# Patient Record
Sex: FEMALE | Race: WHITE | Employment: OTHER | URBAN - METROPOLITAN AREA
[De-identification: names, ages, dates, MRNs, and addresses within clinical notes are randomized per-mention and may not be internally consistent; named-entity substitution may affect disease eponyms.]

---

## 2019-02-06 LAB
ALBUMIN SERPL-MCNC: 4.4 G/DL
ALP BLD-CCNC: 83 U/L
ALT SERPL-CCNC: 29 U/L
ANION GAP SERPL CALCULATED.3IONS-SCNC: 1.8 MMOL/L
AST SERPL-CCNC: 29 U/L
BILIRUB SERPL-MCNC: 0.4 MG/DL (ref 0.1–1.4)
BUN BLDV-MCNC: 13 MG/DL
CALCIUM SERPL-MCNC: 10.3 MG/DL
CHLORIDE BLD-SCNC: 101 MMOL/L
CHOLESTEROL, TOTAL: 138 MG/DL
CHOLESTEROL/HDL RATIO: 2.03
CO2: 26 MMOL/L
CREAT SERPL-MCNC: 1.1 MG/DL
GFR CALCULATED: 51
GLUCOSE BLD-MCNC: 95 MG/DL
HDLC SERPL-MCNC: 68 MG/DL (ref 35–70)
LDL CHOLESTEROL CALCULATED: 43 MG/DL (ref 0–160)
POTASSIUM SERPL-SCNC: 4.3 MMOL/L
SODIUM BLD-SCNC: 142 MMOL/L
TOTAL PROTEIN: 6.8
TRIGL SERPL-MCNC: 133 MG/DL
VLDLC SERPL CALC-MCNC: 27 MG/DL

## 2019-05-14 ENCOUNTER — TELEPHONE (OUTPATIENT)
Dept: FAMILY MEDICINE CLINIC | Age: 69
End: 2019-05-14

## 2019-05-14 RX ORDER — VALSARTAN AND HYDROCHLOROTHIAZIDE 160; 12.5 MG/1; MG/1
1 TABLET, FILM COATED ORAL DAILY
Qty: 90 TABLET | Refills: 0 | Status: SHIPPED | OUTPATIENT
Start: 2019-05-14 | End: 2019-08-12 | Stop reason: SDUPTHER

## 2019-05-14 NOTE — TELEPHONE ENCOUNTER
RF WG/NL (E) VALSARTAN/HCTZ 160/12.5MG 1 QD #90/0.       Electronically signed by Ethan Chin MA on 5/14/2019 at 8:46 AM

## 2019-05-14 NOTE — TELEPHONE ENCOUNTER
----- Message from Alphonso Cline sent at 5/14/2019  8:22 AM EDT -----  Contact: HESHAM  ONLY ONE VALSARTAN LEFT.  NEEDS THE 90 DAY SUPPLY TO [facility].  160-12.5 MG. YOU TALKED ABOUT IT BEFORE. WAS A REPLACEMENT MED.  [phone]

## 2019-07-20 DIAGNOSIS — I10 ESSENTIAL HYPERTENSION: ICD-10-CM

## 2019-07-20 DIAGNOSIS — K21.9 GASTROESOPHAGEAL REFLUX DISEASE, ESOPHAGITIS PRESENCE NOT SPECIFIED: ICD-10-CM

## 2019-07-20 DIAGNOSIS — E78.5 HYPERLIPIDEMIA, UNSPECIFIED HYPERLIPIDEMIA TYPE: ICD-10-CM

## 2019-07-20 DIAGNOSIS — E03.9 HYPOTHYROIDISM, UNSPECIFIED TYPE: ICD-10-CM

## 2019-07-20 RX ORDER — PANTOPRAZOLE SODIUM 40 MG/1
1 TABLET, DELAYED RELEASE ORAL DAILY
Refills: 1 | COMMUNITY
Start: 2019-05-08 | End: 2019-08-12 | Stop reason: SDUPTHER

## 2019-07-20 RX ORDER — MECLIZINE HYDROCHLORIDE 25 MG/1
25 TABLET ORAL 3 TIMES DAILY PRN
COMMUNITY

## 2019-07-20 RX ORDER — ATORVASTATIN CALCIUM 40 MG/1
1 TABLET, FILM COATED ORAL DAILY
Refills: 1 | COMMUNITY
Start: 2019-05-08 | End: 2019-08-12 | Stop reason: SDUPTHER

## 2019-07-20 RX ORDER — LEVOTHYROXINE SODIUM 0.05 MG/1
1 TABLET ORAL DAILY
Refills: 1 | COMMUNITY
Start: 2019-05-08 | End: 2019-08-12 | Stop reason: SDUPTHER

## 2019-07-20 SDOH — HEALTH STABILITY: MENTAL HEALTH: HOW OFTEN DO YOU HAVE A DRINK CONTAINING ALCOHOL?: NEVER

## 2019-08-06 LAB
A/G RATIO: 2.2 (ref 1.1–2.2)
ALBUMIN SERPL-MCNC: 4.6 G/DL (ref 3.4–5)
ALP BLD-CCNC: 69 U/L (ref 40–129)
ALT SERPL-CCNC: 15 U/L (ref 10–40)
ANION GAP SERPL CALCULATED.3IONS-SCNC: 13 MMOL/L (ref 3–16)
AST SERPL-CCNC: 22 U/L (ref 15–37)
BILIRUB SERPL-MCNC: 0.4 MG/DL (ref 0–1)
BUN BLDV-MCNC: 15 MG/DL (ref 7–20)
CALCIUM SERPL-MCNC: 10 MG/DL (ref 8.3–10.6)
CHLORIDE BLD-SCNC: 102 MMOL/L (ref 99–110)
CHOLESTEROL, FASTING: 129 MG/DL (ref 0–199)
CO2: 26 MMOL/L (ref 21–32)
CREAT SERPL-MCNC: 1 MG/DL (ref 0.6–1.2)
GFR AFRICAN AMERICAN: >60
GFR NON-AFRICAN AMERICAN: 55
GLOBULIN: 2.1 G/DL
GLUCOSE FASTING: 99 MG/DL (ref 70–99)
HDLC SERPL-MCNC: 60 MG/DL (ref 40–60)
LDL CHOLESTEROL CALCULATED: 46 MG/DL
POTASSIUM SERPL-SCNC: 4.5 MMOL/L (ref 3.5–5.1)
SODIUM BLD-SCNC: 141 MMOL/L (ref 136–145)
T4 FREE: 1.5 NG/DL (ref 0.9–1.8)
TOTAL PROTEIN: 6.7 G/DL (ref 6.4–8.2)
TRIGLYCERIDE, FASTING: 116 MG/DL (ref 0–150)
TSH SERPL DL<=0.05 MIU/L-ACNC: 3.28 UIU/ML (ref 0.27–4.2)
VLDLC SERPL CALC-MCNC: 23 MG/DL

## 2019-08-12 ENCOUNTER — OFFICE VISIT (OUTPATIENT)
Dept: FAMILY MEDICINE CLINIC | Age: 69
End: 2019-08-12
Payer: MEDICARE

## 2019-08-12 VITALS
WEIGHT: 219 LBS | SYSTOLIC BLOOD PRESSURE: 124 MMHG | DIASTOLIC BLOOD PRESSURE: 68 MMHG | BODY MASS INDEX: 34.37 KG/M2 | HEIGHT: 67 IN

## 2019-08-12 DIAGNOSIS — I10 ESSENTIAL HYPERTENSION: ICD-10-CM

## 2019-08-12 DIAGNOSIS — Z23 NEED FOR PROPHYLACTIC VACCINATION AGAINST STREPTOCOCCUS PNEUMONIAE (PNEUMOCOCCUS): ICD-10-CM

## 2019-08-12 DIAGNOSIS — M79.601 ARM PAIN, DIFFUSE, RIGHT: ICD-10-CM

## 2019-08-12 DIAGNOSIS — Z78.0 POST-MENOPAUSAL: ICD-10-CM

## 2019-08-12 DIAGNOSIS — E78.5 HYPERLIPIDEMIA, UNSPECIFIED HYPERLIPIDEMIA TYPE: Primary | ICD-10-CM

## 2019-08-12 DIAGNOSIS — K21.9 GASTROESOPHAGEAL REFLUX DISEASE, ESOPHAGITIS PRESENCE NOT SPECIFIED: ICD-10-CM

## 2019-08-12 DIAGNOSIS — E03.9 HYPOTHYROIDISM, UNSPECIFIED TYPE: ICD-10-CM

## 2019-08-12 PROCEDURE — 20610 DRAIN/INJ JOINT/BURSA W/O US: CPT | Performed by: FAMILY MEDICINE

## 2019-08-12 PROCEDURE — G8417 CALC BMI ABV UP PARAM F/U: HCPCS | Performed by: FAMILY MEDICINE

## 2019-08-12 PROCEDURE — G8427 DOCREV CUR MEDS BY ELIG CLIN: HCPCS | Performed by: FAMILY MEDICINE

## 2019-08-12 PROCEDURE — 1036F TOBACCO NON-USER: CPT | Performed by: FAMILY MEDICINE

## 2019-08-12 PROCEDURE — 1123F ACP DISCUSS/DSCN MKR DOCD: CPT | Performed by: FAMILY MEDICINE

## 2019-08-12 PROCEDURE — 4040F PNEUMOC VAC/ADMIN/RCVD: CPT | Performed by: FAMILY MEDICINE

## 2019-08-12 PROCEDURE — 3017F COLORECTAL CA SCREEN DOC REV: CPT | Performed by: FAMILY MEDICINE

## 2019-08-12 PROCEDURE — 1090F PRES/ABSN URINE INCON ASSESS: CPT | Performed by: FAMILY MEDICINE

## 2019-08-12 PROCEDURE — G8400 PT W/DXA NO RESULTS DOC: HCPCS | Performed by: FAMILY MEDICINE

## 2019-08-12 PROCEDURE — 99213 OFFICE O/P EST LOW 20 MIN: CPT | Performed by: FAMILY MEDICINE

## 2019-08-12 RX ORDER — ATORVASTATIN CALCIUM 40 MG/1
40 TABLET, FILM COATED ORAL DAILY
Qty: 90 TABLET | Refills: 1 | Status: SHIPPED | OUTPATIENT
Start: 2019-08-12 | End: 2020-02-04 | Stop reason: SDUPTHER

## 2019-08-12 RX ORDER — METHYLPREDNISOLONE ACETATE 80 MG/ML
80 INJECTION, SUSPENSION INTRA-ARTICULAR; INTRALESIONAL; INTRAMUSCULAR; SOFT TISSUE ONCE
Status: DISCONTINUED | OUTPATIENT
Start: 2019-08-12 | End: 2019-08-12

## 2019-08-12 RX ORDER — LEVOTHYROXINE SODIUM 0.05 MG/1
50 TABLET ORAL DAILY
Qty: 90 TABLET | Refills: 1 | Status: SHIPPED | OUTPATIENT
Start: 2019-08-12 | End: 2020-02-04 | Stop reason: SDUPTHER

## 2019-08-12 RX ORDER — METHYLPREDNISOLONE ACETATE 80 MG/ML
80 INJECTION, SUSPENSION INTRA-ARTICULAR; INTRALESIONAL; INTRAMUSCULAR; SOFT TISSUE ONCE
Status: COMPLETED | OUTPATIENT
Start: 2019-08-12 | End: 2019-08-12

## 2019-08-12 RX ORDER — VALSARTAN AND HYDROCHLOROTHIAZIDE 160; 12.5 MG/1; MG/1
1 TABLET, FILM COATED ORAL DAILY
Qty: 90 TABLET | Refills: 1 | Status: SHIPPED | OUTPATIENT
Start: 2019-08-12 | End: 2020-02-04 | Stop reason: SDUPTHER

## 2019-08-12 RX ORDER — PANTOPRAZOLE SODIUM 40 MG/1
40 TABLET, DELAYED RELEASE ORAL DAILY
Qty: 90 TABLET | Refills: 1 | Status: SHIPPED | OUTPATIENT
Start: 2019-08-12 | End: 2020-02-04 | Stop reason: SDUPTHER

## 2019-08-12 RX ADMIN — METHYLPREDNISOLONE ACETATE 80 MG: 80 INJECTION, SUSPENSION INTRA-ARTICULAR; INTRALESIONAL; INTRAMUSCULAR; SOFT TISSUE at 11:29

## 2019-08-12 ASSESSMENT — PATIENT HEALTH QUESTIONNAIRE - PHQ9
SUM OF ALL RESPONSES TO PHQ9 QUESTIONS 1 & 2: 0
1. LITTLE INTEREST OR PLEASURE IN DOING THINGS: 0
2. FEELING DOWN, DEPRESSED OR HOPELESS: 0
SUM OF ALL RESPONSES TO PHQ QUESTIONS 1-9: 0
SUM OF ALL RESPONSES TO PHQ QUESTIONS 1-9: 0

## 2019-08-12 ASSESSMENT — ENCOUNTER SYMPTOMS
GASTROINTESTINAL NEGATIVE: 1
SHORTNESS OF BREATH: 0
CHEST TIGHTNESS: 0
WHEEZING: 0
RESPIRATORY NEGATIVE: 1
COUGH: 0
ABDOMINAL PAIN: 0

## 2019-08-12 NOTE — PROGRESS NOTES
tablet by mouth daily 90 tablet 1    levothyroxine (SYNTHROID) 50 MCG tablet Take 1 tablet by mouth daily 90 tablet 1    meclizine (ANTIVERT) 25 MG tablet Take 25 mg by mouth 3 times daily as needed       Current Facility-Administered Medications   Medication Dose Route Frequency Provider Last Rate Last Dose    methylPREDNISolone acetate (DEPO-MEDROL) injection 80 mg  80 mg Intra-articular Once Paulo Patel MD           ALLERGIES  Allergies   Allergen Reactions    Codeine Other (See Comments)     hallucinations       PHYSICAL EXAM    /68 (Site: Left Upper Arm, Position: Sitting, Cuff Size: Large Adult)   Ht 5' 7\" (1.702 m)   Wt 219 lb (99.3 kg)   BMI 34.30 kg/m²     Physical Exam   Constitutional: She is oriented to person, place, and time. She appears well-developed and well-nourished. HENT:   Right Ear: External ear normal.   Left Ear: External ear normal.   Nose: Nose normal.   Mouth/Throat: Oropharynx is clear and moist.   Eyes: Conjunctivae are normal.   Neck: No thyromegaly present. Cardiovascular: Normal rate, regular rhythm, normal heart sounds and intact distal pulses. Pulmonary/Chest: Effort normal and breath sounds normal. No respiratory distress. She has no wheezes. She has no rales. Abdominal: Soft. She exhibits no distension and no mass. There is no tenderness. There is no guarding. Musculoskeletal:   Tenderness in the subdeltoid area right arm  There is no swelling or redness  She has limitation of abduction because of pain and stiffness at about 90 degrees   Lymphadenopathy:     She has no cervical adenopathy. Neurological: She is alert and oriented to person, place, and time. Skin: Skin is warm and dry. Psychiatric: She has a normal mood and affect. Nursing note and vitals reviewed.   Lab review: The patient's laboratory work including chemistry lipid profile and thyroid labs are within    ASSESSMENT:    Bijan ANSARI was seen today for arm pain, 6 month follow-up and medication refill. Diagnoses and all orders for this visit:    Hyperlipidemia, unspecified hyperlipidemia type  -     Lipid Panel; Future    Post-menopausal    Need for prophylactic vaccination against Streptococcus pneumoniae (pneumococcus)    Hypothyroidism, unspecified type    Gastroesophageal reflux disease, esophagitis presence not specified    Essential hypertension  -     Comprehensive Metabolic Panel; Future    Arm pain, diffuse, right  -     OK ARTHROCENTESIS ASPIR&/INJ MAJOR JT/BURSA W/O US    Other orders  -     valsartan-hydrochlorothiazide (DIOVAN-HCT) 160-12.5 MG per tablet; Take 1 tablet by mouth daily  -     pantoprazole (PROTONIX) 40 MG tablet; Take 1 tablet by mouth daily  -     atorvastatin (LIPITOR) 40 MG tablet; Take 1 tablet by mouth daily  -     levothyroxine (SYNTHROID) 50 MCG tablet; Take 1 tablet by mouth daily  -     Discontinue: methylPREDNISolone acetate (DEPO-MEDROL) injection 80 mg  -     methylPREDNISolone acetate (DEPO-MEDROL) injection 80 mg            PLAN:  The patient declines a bone density study as she had one around age 70 and is always been normal  I talked her about injecting for subacromial bursitis the right shoulder she was in favor that and I injected the right subacromial bursa with 80 mg of Depo-Medrol 2 cc of Xylocaine  She was instructed on how to maintain range of motion and improve range of motion of her right shoulder  She is to call in 3 weeks if she is not better might get an x-ray and consider physical therapy  She is to continue her same chronic medications and follow-up in 6 months and get labs prior to that office visit          No follow-ups on file. Electronically signed by Johnny Heath MD on 8/12/2019    Please note that this chart was generated using dragon dictation software. Although every effort was made to ensure the accuracy of this automated transcription, some errors in transcription may have occurred.

## 2020-02-04 RX ORDER — LEVOTHYROXINE SODIUM 0.05 MG/1
50 TABLET ORAL DAILY
Qty: 90 TABLET | Refills: 0 | Status: SHIPPED | OUTPATIENT
Start: 2020-02-04 | End: 2020-03-16 | Stop reason: SDUPTHER

## 2020-02-04 RX ORDER — PANTOPRAZOLE SODIUM 40 MG/1
40 TABLET, DELAYED RELEASE ORAL DAILY
Qty: 90 TABLET | Refills: 0 | Status: SHIPPED | OUTPATIENT
Start: 2020-02-04 | End: 2020-03-16 | Stop reason: SDUPTHER

## 2020-02-04 RX ORDER — ATORVASTATIN CALCIUM 40 MG/1
40 TABLET, FILM COATED ORAL DAILY
Qty: 90 TABLET | Refills: 0 | Status: SHIPPED | OUTPATIENT
Start: 2020-02-04 | End: 2020-03-16 | Stop reason: SDUPTHER

## 2020-02-04 RX ORDER — VALSARTAN AND HYDROCHLOROTHIAZIDE 160; 12.5 MG/1; MG/1
1 TABLET, FILM COATED ORAL DAILY
Qty: 90 TABLET | Refills: 0 | Status: SHIPPED | OUTPATIENT
Start: 2020-02-04 | End: 2020-03-16 | Stop reason: SDUPTHER

## 2020-03-11 DIAGNOSIS — I10 ESSENTIAL HYPERTENSION: ICD-10-CM

## 2020-03-11 DIAGNOSIS — E03.9 HYPOTHYROIDISM, UNSPECIFIED TYPE: ICD-10-CM

## 2020-03-11 DIAGNOSIS — E78.5 HYPERLIPIDEMIA, UNSPECIFIED HYPERLIPIDEMIA TYPE: ICD-10-CM

## 2020-03-11 LAB
A/G RATIO: 1.8 (ref 1.1–2.2)
ALBUMIN SERPL-MCNC: 4.4 G/DL (ref 3.4–5)
ALP BLD-CCNC: 84 U/L (ref 40–129)
ALT SERPL-CCNC: 33 U/L (ref 10–40)
ANION GAP SERPL CALCULATED.3IONS-SCNC: 12 MMOL/L (ref 3–16)
AST SERPL-CCNC: 31 U/L (ref 15–37)
BILIRUB SERPL-MCNC: 0.4 MG/DL (ref 0–1)
BUN BLDV-MCNC: 12 MG/DL (ref 7–20)
CALCIUM SERPL-MCNC: 9.8 MG/DL (ref 8.3–10.6)
CHLORIDE BLD-SCNC: 103 MMOL/L (ref 99–110)
CHOLESTEROL, TOTAL: 135 MG/DL (ref 0–199)
CO2: 27 MMOL/L (ref 21–32)
CREAT SERPL-MCNC: 0.9 MG/DL (ref 0.6–1.2)
GFR AFRICAN AMERICAN: >60
GFR NON-AFRICAN AMERICAN: >60
GLOBULIN: 2.4 G/DL
GLUCOSE BLD-MCNC: 106 MG/DL (ref 70–99)
HDLC SERPL-MCNC: 50 MG/DL (ref 40–60)
LDL CHOLESTEROL CALCULATED: 43 MG/DL
POTASSIUM SERPL-SCNC: 4.2 MMOL/L (ref 3.5–5.1)
SODIUM BLD-SCNC: 142 MMOL/L (ref 136–145)
T4 FREE: 1.3 NG/DL (ref 0.9–1.8)
TOTAL PROTEIN: 6.8 G/DL (ref 6.4–8.2)
TRIGL SERPL-MCNC: 212 MG/DL (ref 0–150)
TSH SERPL DL<=0.05 MIU/L-ACNC: 3.19 UIU/ML (ref 0.27–4.2)
VLDLC SERPL CALC-MCNC: 42 MG/DL

## 2020-03-13 ENCOUNTER — TELEPHONE (OUTPATIENT)
Dept: INTERNAL MEDICINE CLINIC | Age: 70
End: 2020-03-13

## 2020-03-16 ENCOUNTER — OFFICE VISIT (OUTPATIENT)
Dept: FAMILY MEDICINE CLINIC | Age: 70
End: 2020-03-16
Payer: MEDICARE

## 2020-03-16 VITALS
WEIGHT: 222 LBS | BODY MASS INDEX: 34.84 KG/M2 | SYSTOLIC BLOOD PRESSURE: 120 MMHG | OXYGEN SATURATION: 96 % | HEART RATE: 80 BPM | HEIGHT: 67 IN | DIASTOLIC BLOOD PRESSURE: 84 MMHG

## 2020-03-16 VITALS
OXYGEN SATURATION: 96 % | DIASTOLIC BLOOD PRESSURE: 84 MMHG | WEIGHT: 222 LBS | HEIGHT: 67 IN | SYSTOLIC BLOOD PRESSURE: 120 MMHG | HEART RATE: 80 BPM | BODY MASS INDEX: 34.84 KG/M2

## 2020-03-16 PROCEDURE — 1090F PRES/ABSN URINE INCON ASSESS: CPT | Performed by: FAMILY MEDICINE

## 2020-03-16 PROCEDURE — 1123F ACP DISCUSS/DSCN MKR DOCD: CPT | Performed by: FAMILY MEDICINE

## 2020-03-16 PROCEDURE — G8482 FLU IMMUNIZE ORDER/ADMIN: HCPCS | Performed by: FAMILY MEDICINE

## 2020-03-16 PROCEDURE — 4040F PNEUMOC VAC/ADMIN/RCVD: CPT | Performed by: FAMILY MEDICINE

## 2020-03-16 PROCEDURE — G0438 PPPS, INITIAL VISIT: HCPCS | Performed by: FAMILY MEDICINE

## 2020-03-16 PROCEDURE — 1036F TOBACCO NON-USER: CPT | Performed by: FAMILY MEDICINE

## 2020-03-16 PROCEDURE — G8400 PT W/DXA NO RESULTS DOC: HCPCS | Performed by: FAMILY MEDICINE

## 2020-03-16 PROCEDURE — G8417 CALC BMI ABV UP PARAM F/U: HCPCS | Performed by: FAMILY MEDICINE

## 2020-03-16 PROCEDURE — 99214 OFFICE O/P EST MOD 30 MIN: CPT | Performed by: FAMILY MEDICINE

## 2020-03-16 PROCEDURE — 3017F COLORECTAL CA SCREEN DOC REV: CPT | Performed by: FAMILY MEDICINE

## 2020-03-16 PROCEDURE — G8427 DOCREV CUR MEDS BY ELIG CLIN: HCPCS | Performed by: FAMILY MEDICINE

## 2020-03-16 RX ORDER — PANTOPRAZOLE SODIUM 40 MG/1
40 TABLET, DELAYED RELEASE ORAL DAILY
Qty: 90 TABLET | Refills: 1 | Status: SHIPPED | OUTPATIENT
Start: 2020-03-16 | End: 2020-09-17 | Stop reason: SDUPTHER

## 2020-03-16 RX ORDER — ATORVASTATIN CALCIUM 40 MG/1
40 TABLET, FILM COATED ORAL DAILY
Qty: 90 TABLET | Refills: 1 | Status: SHIPPED | OUTPATIENT
Start: 2020-03-16 | End: 2020-09-17 | Stop reason: SDUPTHER

## 2020-03-16 RX ORDER — DIAZEPAM 10 MG/1
10 TABLET ORAL EVERY 8 HOURS PRN
Qty: 2 TABLET | Refills: 0 | Status: SHIPPED | OUTPATIENT
Start: 2020-03-16 | End: 2020-03-26

## 2020-03-16 RX ORDER — LEVOTHYROXINE SODIUM 0.05 MG/1
50 TABLET ORAL DAILY
Qty: 90 TABLET | Refills: 1 | Status: SHIPPED | OUTPATIENT
Start: 2020-03-16 | End: 2020-09-17 | Stop reason: SDUPTHER

## 2020-03-16 RX ORDER — VALSARTAN AND HYDROCHLOROTHIAZIDE 160; 12.5 MG/1; MG/1
1 TABLET, FILM COATED ORAL DAILY
Qty: 90 TABLET | Refills: 1 | Status: SHIPPED | OUTPATIENT
Start: 2020-03-16 | End: 2020-09-17 | Stop reason: SDUPTHER

## 2020-03-16 ASSESSMENT — PATIENT HEALTH QUESTIONNAIRE - PHQ9
2. FEELING DOWN, DEPRESSED OR HOPELESS: 0
1. LITTLE INTEREST OR PLEASURE IN DOING THINGS: 0
SUM OF ALL RESPONSES TO PHQ QUESTIONS 1-9: 0
SUM OF ALL RESPONSES TO PHQ9 QUESTIONS 1 & 2: 0
SUM OF ALL RESPONSES TO PHQ QUESTIONS 1-9: 0

## 2020-03-16 ASSESSMENT — ENCOUNTER SYMPTOMS
SHORTNESS OF BREATH: 0
WHEEZING: 0
COUGH: 0
CHEST TIGHTNESS: 0
RESPIRATORY NEGATIVE: 1
ABDOMINAL PAIN: 0

## 2020-03-16 ASSESSMENT — LIFESTYLE VARIABLES: HOW OFTEN DO YOU HAVE A DRINK CONTAINING ALCOHOL: 0

## 2020-03-16 NOTE — PROGRESS NOTES
Onset    Other Mother         unknown cardiac    Other Father         unknown cardiac    Other Sister         cardiac       SOCIAL HISTORY  Social History     Socioeconomic History    Marital status:      Spouse name: None    Number of children: None    Years of education: None    Highest education level: None   Occupational History    None   Social Needs    Financial resource strain: None    Food insecurity     Worry: None     Inability: None    Transportation needs     Medical: None     Non-medical: None   Tobacco Use    Smoking status: Never Smoker    Smokeless tobacco: Never Used   Substance and Sexual Activity    Alcohol use: Never     Frequency: Never    Drug use: Never    Sexual activity: None   Lifestyle    Physical activity     Days per week: None     Minutes per session: None    Stress: None   Relationships    Social connections     Talks on phone: None     Gets together: None     Attends [employer] service: None     Active member of club or organization: None     Attends meetings of clubs or organizations: None     Relationship status: None    Intimate partner violence     Fear of current or ex partner: None     Emotionally abused: None     Physically abused: None     Forced sexual activity: None   Other Topics Concern    None   Social History Narrative    None        SURGICAL HISTORY  Past Surgical History:   Procedure Laterality Date    HYSTERECTOMY  1970    KNEE ARTHROSCOPY Left 2013    LUMBAR SPINE SURGERY  2009 & 2014    Herniated disc repair    NECK SURGERY  2004    cervical disc repair       CURRENT MEDICATIONS  Current Outpatient Medications   Medication Sig Dispense Refill    atorvastatin (LIPITOR) 40 MG tablet Take 1 tablet by mouth daily 90 tablet 1    levothyroxine (SYNTHROID) 50 MCG tablet Take 1 tablet by mouth daily 90 tablet 1    pantoprazole (PROTONIX) 40 MG tablet Take 1 tablet by mouth daily 90 tablet 1    valsartan-hydrochlorothiazide (DIOVAN-HCT)

## 2020-03-16 NOTE — PROGRESS NOTES
Medicare Annual Wellness Visit  Name: Wojciech Kaur Date: 3/16/2020   MRN: [id number] Sex: Female   Age: 68 y.o. Ethnicity: Non-/Non    : 1950 Race: Radha Vera is here for Medicare AWV    Screenings for behavioral, psychosocial and functional/safety risks, and cognitive dysfunction are all negative except as indicated below. These results, as well as other patient data from the [address] form, are documented in Flowsheets linked to this Encounter. Allergies   Allergen Reactions    Codeine Other (See Comments)     hallucinations       Prior to Visit Medications    Medication Sig Taking? Authorizing Provider   atorvastatin (LIPITOR) 40 MG tablet Take 1 tablet by mouth daily  Kaycee Mcdowell MD   levothyroxine (SYNTHROID) 50 MCG tablet Take 1 tablet by mouth daily  Kaycee Mcdowell MD   pantoprazole (PROTONIX) 40 MG tablet Take 1 tablet by mouth daily  Kaycee Mcdowell MD   valsartan-hydrochlorothiazide (DIOVAN-HCT) 160-12.5 MG per tablet Take 1 tablet by mouth daily  Kaycee Mcdowell MD   diazePAM (VALIUM) 10 MG tablet Take 1 tablet by mouth every 8 hours as needed for Anxiety or Sleep for up to 10 days.   Kaycee Mcdowell MD   meclizine (ANTIVERT) 25 MG tablet Take 25 mg by mouth 3 times daily as needed  Historical Provider, MD       Past Medical History:   Diagnosis Date    Essential hypertension 2019    GERD (gastroesophageal reflux disease) 2019    Hyperlipidemia 2019    Hypothyroidism 2019       Past Surgical History:   Procedure Laterality Date    HYSTERECTOMY  1970    KNEE ARTHROSCOPY Left 2013    LUMBAR SPINE SURGERY   &     Herniated disc repair    NECK SURGERY      cervical disc repair       Family History   Problem Relation Age of Onset    Other Mother         unknown cardiac    Other Father         unknown cardiac    Other Sister         cardiac       CareTeam (Including outside

## 2020-03-16 NOTE — PATIENT INSTRUCTIONS
Personalized Preventive Plan for Elsie Holcomb - 3/16/2020  Medicare offers a range of preventive health benefits. Some of the tests and screenings are paid in full while other may be subject to a deductible, co-insurance, and/or copay. Some of these benefits include a comprehensive review of your medical history including lifestyle, illnesses that may run in your family, and various assessments and screenings as appropriate. After reviewing your medical record and screening and assessments performed today your provider may have ordered immunizations, labs, imaging, and/or referrals for you. A list of these orders (if applicable) as well as your Preventive Care list are included within your After Visit Summary for your review. Other Preventive Recommendations:    · A preventive eye exam performed by an eye specialist is recommended every 1-2 years to screen for glaucoma; cataracts, macular degeneration, and other eye disorders. · A preventive dental visit is recommended every 6 months. · Try to get at least 150 minutes of exercise per week or 10,000 steps per day on a pedometer . · Order or download the FREE \"Exercise & Physical Activity: Your Everyday Guide\" from [employer]. Call 8-406.992.3445 or search [employer] online. · You need 8843-2570 mg of calcium and 3264-1587 IU of vitamin D per day. It is possible to meet your calcium requirement with diet alone, but a vitamin D supplement is usually necessary to meet this goal.  · When exposed to the sun, use a sunscreen that protects against both UVA and UVB radiation with an SPF of 30 or greater. Reapply every 2 to 3 hours or after sweating, drying off with a towel, or swimming. · Always wear a seat belt when traveling in a car. Always wear a helmet when riding a bicycle or motorcycle.     Keeping Home a [facility]       As we get older, changes in balance, gait, strength, vision, hearing, and cognition make \"Older Consumer Home Safety Checklist,\" it is important to check for potential hazards in each room. And remember, proper lighting is an essential factor in home safety. If you cannot see clearly, you are more likely to fall. Important questions to ask yourself include:   Are lamp, electric, extension, and telephone cords placed out of the flow of traffic and maintained in good condition? Have frayed cords been replaced? Are all small rugs and runners slip resistant? If not, you can secure them to the floor with a special double-sided carpet tape. Are smoke detectors properly locatedone on every floor of your home and one outside of every sleeping area? Are they in good working order? Are batteries replaced at least once a year? Do you have a well-maintained carbon monoxide detector outside every sleeping are in your home? Does your furniture layout leave plenty of space to maneuver between and around chairs, tables, beds, and sofas? Are hallways, stairs and passages between rooms well lit? Can you reach a lamp without getting out of bed? Are floor surfaces well maintained? Shag rugs, high-pile carpeting, tile floors, and polished wood floors can be particularly slippery. Stairs should always have handrails and be carpeted or fitted with a non-skid tread. Is your telephone easily reachable. Is the cord safely tucked away? Room by Room   According to the [employer], bathrooms and marry are the two most potentially hazardous rooms in your home. In the Kitchen    Be sure your stove is in proper working order and always make sure burners and the oven are off before you go out or go to sleep. Keep pots on the back burners, turn handles away from the front of the stove, and keep stove clean and free of grease build-up. Kitchen ventilation systems and range exhausts should be working properly.     Keep flammable objects such as towels and pot holders away from the cooking area except

## 2020-03-23 ENCOUNTER — TELEPHONE (OUTPATIENT)
Dept: FAMILY MEDICINE CLINIC | Age: 70
End: 2020-03-23

## 2020-09-15 DIAGNOSIS — R73.01 IFG (IMPAIRED FASTING GLUCOSE): ICD-10-CM

## 2020-09-15 DIAGNOSIS — E03.9 HYPOTHYROIDISM, UNSPECIFIED TYPE: ICD-10-CM

## 2020-09-15 DIAGNOSIS — E78.5 HYPERLIPIDEMIA, UNSPECIFIED HYPERLIPIDEMIA TYPE: ICD-10-CM

## 2020-09-15 LAB
A/G RATIO: 2 (ref 1.1–2.2)
ALBUMIN SERPL-MCNC: 4.4 G/DL (ref 3.4–5)
ALP BLD-CCNC: 72 U/L (ref 40–129)
ALT SERPL-CCNC: 23 U/L (ref 10–40)
ANION GAP SERPL CALCULATED.3IONS-SCNC: 11 MMOL/L (ref 3–16)
AST SERPL-CCNC: 28 U/L (ref 15–37)
BILIRUB SERPL-MCNC: 0.5 MG/DL (ref 0–1)
BUN BLDV-MCNC: 18 MG/DL (ref 7–20)
CALCIUM SERPL-MCNC: 10.1 MG/DL (ref 8.3–10.6)
CHLORIDE BLD-SCNC: 105 MMOL/L (ref 99–110)
CHOLESTEROL, TOTAL: 116 MG/DL (ref 0–199)
CO2: 26 MMOL/L (ref 21–32)
CREAT SERPL-MCNC: 1 MG/DL (ref 0.6–1.2)
GFR AFRICAN AMERICAN: >60
GFR NON-AFRICAN AMERICAN: 55
GLOBULIN: 2.2 G/DL
GLUCOSE BLD-MCNC: 99 MG/DL (ref 70–99)
HDLC SERPL-MCNC: 59 MG/DL (ref 40–60)
LDL CHOLESTEROL CALCULATED: 37 MG/DL
POTASSIUM SERPL-SCNC: 3.8 MMOL/L (ref 3.5–5.1)
SODIUM BLD-SCNC: 142 MMOL/L (ref 136–145)
T4 FREE: 1.3 NG/DL (ref 0.9–1.8)
TOTAL PROTEIN: 6.6 G/DL (ref 6.4–8.2)
TRIGL SERPL-MCNC: 102 MG/DL (ref 0–150)
TSH SERPL DL<=0.05 MIU/L-ACNC: 2.32 UIU/ML (ref 0.27–4.2)
VLDLC SERPL CALC-MCNC: 20 MG/DL

## 2020-09-16 LAB
ESTIMATED AVERAGE GLUCOSE: 114 MG/DL
HBA1C MFR BLD: 5.6 %

## 2020-09-17 ENCOUNTER — OFFICE VISIT (OUTPATIENT)
Dept: FAMILY MEDICINE CLINIC | Age: 70
End: 2020-09-17
Payer: MEDICARE

## 2020-09-17 VITALS
DIASTOLIC BLOOD PRESSURE: 78 MMHG | TEMPERATURE: 96.7 F | BODY MASS INDEX: 33.27 KG/M2 | OXYGEN SATURATION: 98 % | WEIGHT: 212 LBS | HEART RATE: 68 BPM | HEIGHT: 67 IN | SYSTOLIC BLOOD PRESSURE: 112 MMHG

## 2020-09-17 PROBLEM — R73.01 IFG (IMPAIRED FASTING GLUCOSE): Chronic | Status: ACTIVE | Noted: 2020-09-17

## 2020-09-17 PROCEDURE — G8400 PT W/DXA NO RESULTS DOC: HCPCS | Performed by: FAMILY MEDICINE

## 2020-09-17 PROCEDURE — G8427 DOCREV CUR MEDS BY ELIG CLIN: HCPCS | Performed by: FAMILY MEDICINE

## 2020-09-17 PROCEDURE — 99213 OFFICE O/P EST LOW 20 MIN: CPT | Performed by: FAMILY MEDICINE

## 2020-09-17 PROCEDURE — G8417 CALC BMI ABV UP PARAM F/U: HCPCS | Performed by: FAMILY MEDICINE

## 2020-09-17 PROCEDURE — 1123F ACP DISCUSS/DSCN MKR DOCD: CPT | Performed by: FAMILY MEDICINE

## 2020-09-17 PROCEDURE — 3017F COLORECTAL CA SCREEN DOC REV: CPT | Performed by: FAMILY MEDICINE

## 2020-09-17 PROCEDURE — 1090F PRES/ABSN URINE INCON ASSESS: CPT | Performed by: FAMILY MEDICINE

## 2020-09-17 PROCEDURE — 4040F PNEUMOC VAC/ADMIN/RCVD: CPT | Performed by: FAMILY MEDICINE

## 2020-09-17 PROCEDURE — 1036F TOBACCO NON-USER: CPT | Performed by: FAMILY MEDICINE

## 2020-09-17 RX ORDER — VALSARTAN AND HYDROCHLOROTHIAZIDE 160; 12.5 MG/1; MG/1
1 TABLET, FILM COATED ORAL DAILY
Qty: 90 TABLET | Refills: 1 | Status: SHIPPED | OUTPATIENT
Start: 2020-09-17 | End: 2021-03-18 | Stop reason: SDUPTHER

## 2020-09-17 RX ORDER — ATORVASTATIN CALCIUM 40 MG/1
40 TABLET, FILM COATED ORAL DAILY
Qty: 90 TABLET | Refills: 1 | Status: SHIPPED | OUTPATIENT
Start: 2020-09-17 | End: 2021-03-18 | Stop reason: SDUPTHER

## 2020-09-17 RX ORDER — PANTOPRAZOLE SODIUM 40 MG/1
40 TABLET, DELAYED RELEASE ORAL DAILY
Qty: 90 TABLET | Refills: 1 | Status: SHIPPED | OUTPATIENT
Start: 2020-09-17 | End: 2021-03-18 | Stop reason: SDUPTHER

## 2020-09-17 RX ORDER — LEVOTHYROXINE SODIUM 0.05 MG/1
50 TABLET ORAL DAILY
Qty: 90 TABLET | Refills: 1 | Status: SHIPPED | OUTPATIENT
Start: 2020-09-17 | End: 2021-03-18 | Stop reason: SDUPTHER

## 2020-09-17 ASSESSMENT — ENCOUNTER SYMPTOMS
COUGH: 0
ALLERGIC/IMMUNOLOGIC NEGATIVE: 1
SORE THROAT: 0
SHORTNESS OF BREATH: 0
DIARRHEA: 0

## 2020-09-17 NOTE — PROGRESS NOTES
2020     Genia Trejo      Chief Complaint   Patient presents with    6 Month Follow-Up     No concerns, She has lost weight, increased exercise    Medication Refill       HPI      Kar Canchola is a 68 y.o. female who presents today with the followin. Hypothyroidism, unspecified type    2. Essential hypertension    3. Hyperlipidemia, unspecified hyperlipidemia type    4. IFG (impaired fasting glucose)      Follow-up of multiple medical problems  The patient is a generally doing well  She lost about 10 pounds after I told her she had impaired fasting glucose  She lives with her   She has had minimal exposure to other people  She is following the guidelines for COVID-19 prevention  She has no symptoms nor known exposure  REVIEW OF SYMPTOMS    Review of Systems   Constitutional: Positive for activity change. Negative for fever and unexpected weight change. HENT: Negative for congestion and sore throat. Respiratory: Negative for cough and shortness of breath. Cardiovascular: Negative for chest pain. History of essential hypertension  History of hyperlipidemia  Both well controlled on medication and diet   Gastrointestinal: Negative for diarrhea. Endocrine:        History of impaired fasting glucose  As she is lost weight recently  She has been on long-term thyroid replacement   Genitourinary: Negative for difficulty urinating. CKD 3 which is been stable   Musculoskeletal:        Takes ibuprofen over-the-counter low-dose occasionally   Allergic/Immunologic: Negative. Neurological: Negative. Psychiatric/Behavioral: Negative.         PAST MEDICAL HISTORY  Past Medical History:   Diagnosis Date    Essential hypertension 2019    GERD (gastroesophageal reflux disease) 2019    Hyperlipidemia 2019    Hypothyroidism 2019       FAMILY HISTORY  Family History   Problem Relation Age of Onset    Other Mother         unknown cardiac    Other Father         unknown cardiac    Other Sister         cardiac       SOCIAL HISTORY  Social History     Socioeconomic History    Marital status:      Spouse name: None    Number of children: None    Years of education: None    Highest education level: None   Occupational History    None   Social Needs    Financial resource strain: None    Food insecurity     Worry: None     Inability: None    Transportation needs     Medical: None     Non-medical: None   Tobacco Use    Smoking status: Never Smoker    Smokeless tobacco: Never Used   Substance and Sexual Activity    Alcohol use: Never     Frequency: Never    Drug use: Never    Sexual activity: None   Lifestyle    Physical activity     Days per week: None     Minutes per session: None    Stress: None   Relationships    Social connections     Talks on phone: None     Gets together: None     Attends [employer] service: None     Active member of club or organization: None     Attends meetings of clubs or organizations: None     Relationship status: None    Intimate partner violence     Fear of current or ex partner: None     Emotionally abused: None     Physically abused: None     Forced sexual activity: None   Other Topics Concern    None   Social History Narrative    None        SURGICAL HISTORY  Past Surgical History:   Procedure Laterality Date    HYSTERECTOMY  1970    KNEE ARTHROSCOPY Left 2013    LUMBAR SPINE SURGERY  2009 & 2014    Herniated disc repair    NECK SURGERY  2004    cervical disc repair       CURRENT MEDICATIONS  Current Outpatient Medications   Medication Sig Dispense Refill    valsartan-hydrochlorothiazide (DIOVAN-HCT) 160-12.5 MG per tablet Take 1 tablet by mouth daily 90 tablet 1    pantoprazole (PROTONIX) 40 MG tablet Take 1 tablet by mouth daily 90 tablet 1    levothyroxine (SYNTHROID) 50 MCG tablet Take 1 tablet by mouth daily 90 tablet 1    atorvastatin (LIPITOR) 40 MG tablet Take 1 tablet by mouth daily 90 tablet 1    meclizine (ANTIVERT) 25 MG tablet Take 25 mg by mouth 3 times daily as needed       No current facility-administered medications for this visit. ALLERGIES  Allergies   Allergen Reactions    Codeine Other (See Comments)     hallucinations       PHYSICAL EXAM    /78 (Site: Right Upper Arm, Position: Sitting, Cuff Size: Large Adult)   Pulse 68   Temp 96.7 °F (35.9 °C) (Temporal)   Ht 5' 7\" (1.702 m)   Wt 212 lb (96.2 kg)   SpO2 98%   BMI 33.20 kg/m²     Physical Exam  Vitals signs and nursing note reviewed. Constitutional:       General: She is not in acute distress. Appearance: Normal appearance. HENT:      Right Ear: External ear normal.      Left Ear: External ear normal.   Eyes:      Conjunctiva/sclera: Conjunctivae normal.   Cardiovascular:      Rate and Rhythm: Normal rate. Pulmonary:      Effort: Pulmonary effort is normal. No respiratory distress. Neurological:      General: No focal deficit present. Mental Status: She is alert. Mental status is at baseline. Psychiatric:         Mood and Affect: Mood normal.         Thought Content: Thought content normal.     Immunization status reviewed patient has had both pneumococcal vaccines  Lab review  GFR 55 which is stable  Cholesterol 116  A1c has dropped down to 5.6 and fasting blood sugar is 99  Thyroid labs are normal    ASSESSMENT and PLAN    Holli ANSARI was seen today for 6 month follow-up and medication refill. Diagnoses and all orders for this visit:    Hypothyroidism, unspecified type  -     T4, Free; Future  -     TSH without Reflex; Future    Essential hypertension  -     Comprehensive Metabolic Panel; Future    Hyperlipidemia, unspecified hyperlipidemia type  -     Lipid Panel; Future    IFG (impaired fasting glucose)  -     Hemoglobin A1C; Future    Other orders  -     valsartan-hydrochlorothiazide (DIOVAN-HCT) 160-12.5 MG per tablet; Take 1 tablet by mouth daily  -     pantoprazole (PROTONIX) 40 MG tablet;  Take 1 tablet by mouth daily  -     levothyroxine (SYNTHROID) 50 MCG tablet; Take 1 tablet by mouth daily  -     atorvastatin (LIPITOR) 40 MG tablet; Take 1 tablet by mouth daily      Patient is doing well  She is improved with weight loss as far as the borderline blood sugar  She is up-to-date on immunizations  Would follow-up in 6 months  Continue same meds and diet      Return in about 6 months (around 3/17/2021). Electronically signed by Davidson Enciso MD on 9/17/2020    Please note that this chart was generated using dragon dictation software. Although every effort was made to ensure the accuracy of this automated transcription, some errors in transcription may have occurred.

## 2021-03-16 DIAGNOSIS — E03.9 HYPOTHYROIDISM, UNSPECIFIED TYPE: ICD-10-CM

## 2021-03-16 DIAGNOSIS — I10 ESSENTIAL HYPERTENSION: ICD-10-CM

## 2021-03-16 DIAGNOSIS — E78.5 HYPERLIPIDEMIA, UNSPECIFIED HYPERLIPIDEMIA TYPE: ICD-10-CM

## 2021-03-16 DIAGNOSIS — R73.01 IFG (IMPAIRED FASTING GLUCOSE): Chronic | ICD-10-CM

## 2021-03-16 LAB
A/G RATIO: 1.8 (ref 1.1–2.2)
ALBUMIN SERPL-MCNC: 4.4 G/DL (ref 3.4–5)
ALP BLD-CCNC: 98 U/L (ref 40–129)
ALT SERPL-CCNC: 36 U/L (ref 10–40)
ANION GAP SERPL CALCULATED.3IONS-SCNC: 11 MMOL/L (ref 3–16)
AST SERPL-CCNC: 35 U/L (ref 15–37)
BILIRUB SERPL-MCNC: 0.5 MG/DL (ref 0–1)
BUN BLDV-MCNC: 13 MG/DL (ref 7–20)
CALCIUM SERPL-MCNC: 9.8 MG/DL (ref 8.3–10.6)
CHLORIDE BLD-SCNC: 103 MMOL/L (ref 99–110)
CHOLESTEROL, TOTAL: 134 MG/DL (ref 0–199)
CO2: 28 MMOL/L (ref 21–32)
CREAT SERPL-MCNC: 0.8 MG/DL (ref 0.6–1.2)
GFR AFRICAN AMERICAN: >60
GFR NON-AFRICAN AMERICAN: >60
GLOBULIN: 2.5 G/DL
GLUCOSE BLD-MCNC: 102 MG/DL (ref 70–99)
HDLC SERPL-MCNC: 58 MG/DL (ref 40–60)
LDL CHOLESTEROL CALCULATED: 54 MG/DL
POTASSIUM SERPL-SCNC: 4.4 MMOL/L (ref 3.5–5.1)
SODIUM BLD-SCNC: 142 MMOL/L (ref 136–145)
T4 FREE: 1.2 NG/DL (ref 0.9–1.8)
TOTAL PROTEIN: 6.9 G/DL (ref 6.4–8.2)
TRIGL SERPL-MCNC: 109 MG/DL (ref 0–150)
TSH SERPL DL<=0.05 MIU/L-ACNC: 3.17 UIU/ML (ref 0.27–4.2)
VLDLC SERPL CALC-MCNC: 22 MG/DL

## 2021-03-17 LAB
ESTIMATED AVERAGE GLUCOSE: 105.4 MG/DL
HBA1C MFR BLD: 5.3 %

## 2021-03-18 ENCOUNTER — OFFICE VISIT (OUTPATIENT)
Dept: FAMILY MEDICINE CLINIC | Age: 71
End: 2021-03-18
Payer: MEDICARE

## 2021-03-18 VITALS
WEIGHT: 214 LBS | HEIGHT: 67 IN | OXYGEN SATURATION: 96 % | BODY MASS INDEX: 33.59 KG/M2 | RESPIRATION RATE: 16 BRPM | SYSTOLIC BLOOD PRESSURE: 127 MMHG | DIASTOLIC BLOOD PRESSURE: 76 MMHG | TEMPERATURE: 97.7 F | HEART RATE: 77 BPM

## 2021-03-18 DIAGNOSIS — K21.9 GASTROESOPHAGEAL REFLUX DISEASE, UNSPECIFIED WHETHER ESOPHAGITIS PRESENT: ICD-10-CM

## 2021-03-18 DIAGNOSIS — Z12.11 SCREEN FOR COLON CANCER: ICD-10-CM

## 2021-03-18 DIAGNOSIS — E03.9 HYPOTHYROIDISM, UNSPECIFIED TYPE: ICD-10-CM

## 2021-03-18 DIAGNOSIS — R73.01 IFG (IMPAIRED FASTING GLUCOSE): Primary | Chronic | ICD-10-CM

## 2021-03-18 DIAGNOSIS — I10 ESSENTIAL HYPERTENSION: ICD-10-CM

## 2021-03-18 DIAGNOSIS — E78.5 HYPERLIPIDEMIA, UNSPECIFIED HYPERLIPIDEMIA TYPE: ICD-10-CM

## 2021-03-18 PROCEDURE — 1123F ACP DISCUSS/DSCN MKR DOCD: CPT | Performed by: FAMILY MEDICINE

## 2021-03-18 PROCEDURE — G8417 CALC BMI ABV UP PARAM F/U: HCPCS | Performed by: FAMILY MEDICINE

## 2021-03-18 PROCEDURE — 3017F COLORECTAL CA SCREEN DOC REV: CPT | Performed by: FAMILY MEDICINE

## 2021-03-18 PROCEDURE — 1036F TOBACCO NON-USER: CPT | Performed by: FAMILY MEDICINE

## 2021-03-18 PROCEDURE — 1090F PRES/ABSN URINE INCON ASSESS: CPT | Performed by: FAMILY MEDICINE

## 2021-03-18 PROCEDURE — 4040F PNEUMOC VAC/ADMIN/RCVD: CPT | Performed by: FAMILY MEDICINE

## 2021-03-18 PROCEDURE — G8427 DOCREV CUR MEDS BY ELIG CLIN: HCPCS | Performed by: FAMILY MEDICINE

## 2021-03-18 PROCEDURE — 99214 OFFICE O/P EST MOD 30 MIN: CPT | Performed by: FAMILY MEDICINE

## 2021-03-18 PROCEDURE — G8484 FLU IMMUNIZE NO ADMIN: HCPCS | Performed by: FAMILY MEDICINE

## 2021-03-18 PROCEDURE — G8400 PT W/DXA NO RESULTS DOC: HCPCS | Performed by: FAMILY MEDICINE

## 2021-03-18 RX ORDER — PANTOPRAZOLE SODIUM 40 MG/1
40 TABLET, DELAYED RELEASE ORAL DAILY
Qty: 90 TABLET | Refills: 1 | Status: SHIPPED | OUTPATIENT
Start: 2021-03-18 | End: 2021-09-16 | Stop reason: SDUPTHER

## 2021-03-18 RX ORDER — VALSARTAN AND HYDROCHLOROTHIAZIDE 160; 12.5 MG/1; MG/1
1 TABLET, FILM COATED ORAL DAILY
Qty: 90 TABLET | Refills: 1 | Status: SHIPPED | OUTPATIENT
Start: 2021-03-18 | End: 2021-09-16 | Stop reason: SDUPTHER

## 2021-03-18 RX ORDER — ATORVASTATIN CALCIUM 40 MG/1
40 TABLET, FILM COATED ORAL DAILY
Qty: 90 TABLET | Refills: 1 | Status: SHIPPED | OUTPATIENT
Start: 2021-03-18 | End: 2021-09-16 | Stop reason: SDUPTHER

## 2021-03-18 RX ORDER — LEVOTHYROXINE SODIUM 0.05 MG/1
50 TABLET ORAL DAILY
Qty: 90 TABLET | Refills: 1 | Status: SHIPPED | OUTPATIENT
Start: 2021-03-18 | End: 2021-09-16 | Stop reason: SDUPTHER

## 2021-03-18 ASSESSMENT — ENCOUNTER SYMPTOMS
SHORTNESS OF BREATH: 0
COUGH: 0

## 2021-03-18 NOTE — PROGRESS NOTES
3/18/2021     Chivo Marcelo      No chief complaint on file. MORGAN Downey is a 65 y.o. female who presents today with the followin. IFG (impaired fasting glucose)    2. Gastroesophageal reflux disease, unspecified whether esophagitis present    3. Hyperlipidemia, unspecified hyperlipidemia type    4. Hypothyroidism, unspecified type    5. Essential hypertension    6. Screen for colon cancer    She is here for 6-month follow-up  She is completing her Covid vaccination series    REVIEW OF SYMPTOMS    Review of Systems   Constitutional: Negative for activity change and unexpected weight change. Respiratory: Negative for cough and shortness of breath.     Cardiovascular:        History of hypertension and hyperlipidemia both controlled on medication and diet   Endocrine:        History impaired fasting glucose  She is lost a little bit of weight  On levothyroxine therapy for hypothyroidism       PAST MEDICAL HISTORY  Past Medical History:   Diagnosis Date    Essential hypertension 2019    GERD (gastroesophageal reflux disease) 2019    Hyperlipidemia 2019    Hypothyroidism 2019       FAMILY HISTORY  Family History   Problem Relation Age of Onset    Other Mother         unknown cardiac    Other Father         unknown cardiac    Other Sister         cardiac       SOCIAL HISTORY  Social History     Socioeconomic History    Marital status:      Spouse name: None    Number of children: None    Years of education: None    Highest education level: None   Occupational History    None   Social Needs    Financial resource strain: None    Food insecurity     Worry: None     Inability: None    Transportation needs     Medical: None     Non-medical: None   Tobacco Use    Smoking status: Never Smoker    Smokeless tobacco: Never Used   Substance and Sexual Activity    Alcohol use: Never     Frequency: Never    Drug use: Never    Sexual activity: None   Lifestyle    Physical results reviewed  A1c 5.3 which is improved  Thyroid chemistry lipids all in range Sugar 102    Immunizations reviewed      ASSESSMENT and PLAN    Diagnoses and all orders for this visit:    IFG (impaired fasting glucose)  -     Hemoglobin A1C; Future    Gastroesophageal reflux disease, unspecified whether esophagitis present    Hyperlipidemia, unspecified hyperlipidemia type  -     Comprehensive Metabolic Panel; Future  -     Lipid Panel; Future  -     T4, Free; Future  -     TSH without Reflex; Future    Hypothyroidism, unspecified type    Essential hypertension    Screen for colon cancer  -     Cologuard; Future    Other orders  -     atorvastatin (LIPITOR) 40 MG tablet; Take 1 tablet by mouth daily  -     levothyroxine (SYNTHROID) 50 MCG tablet; Take 1 tablet by mouth daily  -     pantoprazole (PROTONIX) 40 MG tablet; Take 1 tablet by mouth daily  -     valsartan-hydroCHLOROthiazide (DIOVAN-HCT) 160-12.5 MG per tablet; Take 1 tablet by mouth daily    Patient is due for Cologuard screening we will go ahead make referral on that she should continue same medicine diet follow-up in 6 months    No follow-ups on file. Electronically signed by Danni Mayo MD on 3/18/2021    Please note that this chart was generated using dragon dictation software. Although every effort was made to ensure the accuracy of this automated transcription, some errors in transcription may have occurred.

## 2021-04-21 DIAGNOSIS — Z12.11 SCREEN FOR COLON CANCER: ICD-10-CM

## 2021-09-16 ENCOUNTER — OFFICE VISIT (OUTPATIENT)
Dept: FAMILY MEDICINE CLINIC | Age: 71
End: 2021-09-16
Payer: MEDICARE

## 2021-09-16 VITALS
DIASTOLIC BLOOD PRESSURE: 72 MMHG | SYSTOLIC BLOOD PRESSURE: 124 MMHG | RESPIRATION RATE: 16 BRPM | HEART RATE: 67 BPM | WEIGHT: 215 LBS | BODY MASS INDEX: 33.74 KG/M2 | HEIGHT: 67 IN | OXYGEN SATURATION: 94 %

## 2021-09-16 DIAGNOSIS — R73.01 IFG (IMPAIRED FASTING GLUCOSE): ICD-10-CM

## 2021-09-16 DIAGNOSIS — E03.9 HYPOTHYROIDISM, UNSPECIFIED TYPE: ICD-10-CM

## 2021-09-16 DIAGNOSIS — E78.5 HYPERLIPIDEMIA, UNSPECIFIED HYPERLIPIDEMIA TYPE: ICD-10-CM

## 2021-09-16 DIAGNOSIS — K21.9 GASTROESOPHAGEAL REFLUX DISEASE, UNSPECIFIED WHETHER ESOPHAGITIS PRESENT: ICD-10-CM

## 2021-09-16 DIAGNOSIS — M19.011 PRIMARY OSTEOARTHRITIS OF RIGHT SHOULDER: Primary | ICD-10-CM

## 2021-09-16 DIAGNOSIS — I10 ESSENTIAL HYPERTENSION: ICD-10-CM

## 2021-09-16 DIAGNOSIS — Z23 NEED FOR VACCINATION: ICD-10-CM

## 2021-09-16 PROBLEM — M00.9 PYOGENIC ARTHRITIS OF SHOULDER REGION (HCC): Status: ACTIVE | Noted: 2021-09-16

## 2021-09-16 PROCEDURE — 99214 OFFICE O/P EST MOD 30 MIN: CPT | Performed by: STUDENT IN AN ORGANIZED HEALTH CARE EDUCATION/TRAINING PROGRAM

## 2021-09-16 PROCEDURE — 90694 VACC AIIV4 NO PRSRV 0.5ML IM: CPT | Performed by: STUDENT IN AN ORGANIZED HEALTH CARE EDUCATION/TRAINING PROGRAM

## 2021-09-16 PROCEDURE — 96372 THER/PROPH/DIAG INJ SC/IM: CPT | Performed by: STUDENT IN AN ORGANIZED HEALTH CARE EDUCATION/TRAINING PROGRAM

## 2021-09-16 PROCEDURE — G0008 ADMIN INFLUENZA VIRUS VAC: HCPCS | Performed by: STUDENT IN AN ORGANIZED HEALTH CARE EDUCATION/TRAINING PROGRAM

## 2021-09-16 RX ORDER — PANTOPRAZOLE SODIUM 40 MG/1
40 TABLET, DELAYED RELEASE ORAL DAILY
Qty: 90 TABLET | Refills: 1 | Status: SHIPPED | OUTPATIENT
Start: 2021-09-16 | End: 2022-03-21 | Stop reason: SDUPTHER

## 2021-09-16 RX ORDER — METHYLPREDNISOLONE ACETATE 40 MG/ML
40 INJECTION, SUSPENSION INTRA-ARTICULAR; INTRALESIONAL; INTRAMUSCULAR; SOFT TISSUE ONCE
Status: COMPLETED | OUTPATIENT
Start: 2021-09-16 | End: 2021-09-16

## 2021-09-16 RX ORDER — VALSARTAN AND HYDROCHLOROTHIAZIDE 160; 12.5 MG/1; MG/1
1 TABLET, FILM COATED ORAL DAILY
Qty: 90 TABLET | Refills: 1 | Status: SHIPPED | OUTPATIENT
Start: 2021-09-16 | End: 2022-03-21 | Stop reason: SDUPTHER

## 2021-09-16 RX ORDER — ATORVASTATIN CALCIUM 40 MG/1
40 TABLET, FILM COATED ORAL DAILY
Qty: 90 TABLET | Refills: 1 | Status: SHIPPED | OUTPATIENT
Start: 2021-09-16 | End: 2022-03-21 | Stop reason: SDUPTHER

## 2021-09-16 RX ORDER — LEVOTHYROXINE SODIUM 0.05 MG/1
50 TABLET ORAL DAILY
Qty: 90 TABLET | Refills: 1 | Status: SHIPPED | OUTPATIENT
Start: 2021-09-16 | End: 2022-03-21 | Stop reason: SDUPTHER

## 2021-09-16 RX ADMIN — METHYLPREDNISOLONE ACETATE 40 MG: 40 INJECTION, SUSPENSION INTRA-ARTICULAR; INTRALESIONAL; INTRAMUSCULAR; SOFT TISSUE at 08:57

## 2021-09-16 ASSESSMENT — ENCOUNTER SYMPTOMS
ABDOMINAL PAIN: 0
NAUSEA: 0
SORE THROAT: 0
SHORTNESS OF BREATH: 0
WHEEZING: 0

## 2021-09-16 NOTE — PROGRESS NOTES
9/26/2021    Darshan Stafford    Chief Complaint   Patient presents with    6 Month Follow-Up     Presenting for 6 month f/u visit.  Shoulder Pain     Right shoulder pain worse. Would like shot. Has had a shot in a few years.  Other     flu shot given       HPI  History was obtained from patient. Suresh Sibley is a 65 y.o. female with a PMHx htn, gerd, hld, hypothyroidism as listed below who presents today for 6 month follow up on chronic conditions    Shoulder chronic years, improved with steroid shot 2 years ago by Dr. Ellis Valentin. Pain is worse with colder temperatures. Otherwise fair control of pain on current regimen. Patient notes she already had Pneumvax vaccine    Recent cologuard negatvie  Labs reviewed hemoglobin A1c 5.5 TSH within normal limits excellent cholesterol    1. Primary osteoarthritis of right shoulder    2. Hyperlipidemia, unspecified hyperlipidemia type    3. IFG (impaired fasting glucose)    4. Gastroesophageal reflux disease, unspecified whether esophagitis present    5. Hypothyroidism, unspecified type    6. Essential hypertension    7. Need for vaccination             REVIEW OF SYMPTOMS    Review of Systems   Constitutional: Negative for chills and fatigue. HENT: Negative for congestion and sore throat. Respiratory: Negative for shortness of breath and wheezing. Cardiovascular: Negative for chest pain and palpitations. Gastrointestinal: Negative for abdominal pain and nausea. Genitourinary: Negative for frequency and urgency. Neurological: Negative for light-headedness.        PAST MEDICAL HISTORY  Past Medical History:   Diagnosis Date    Essential hypertension 7/20/2019    GERD (gastroesophageal reflux disease) 7/20/2019    Hyperlipidemia 7/20/2019    Hypothyroidism 7/20/2019       FAMILY HISTORY  Family History   Problem Relation Age of Onset    Other Mother         unknown cardiac    Other Father         unknown cardiac    Other Sister         cardiac SOCIAL HISTORY  Social History     Socioeconomic History    Marital status:      Spouse name: None    Number of children: None    Years of education: None    Highest education level: None   Occupational History    None   Tobacco Use    Smoking status: Never Smoker    Smokeless tobacco: Never Used   Substance and Sexual Activity    Alcohol use: Never    Drug use: Never    Sexual activity: None   Other Topics Concern    None   Social History Narrative    None     Social Determinants of Health     Financial Resource Strain:     Difficulty of Paying Living Expenses:    Food Insecurity:     Worried About Running Out of Food in the Last Year:     Ran Out of Food in the Last Year:    Transportation Needs:     Lack of Transportation (Medical):      Lack of Transportation (Non-Medical):    Physical Activity:     Days of Exercise per Week:     Minutes of Exercise per Session:    Stress:     Feeling of Stress :    Social Connections:     Frequency of Communication with Friends and Family:     Frequency of Social Gatherings with Friends and Family:     Attends Protestant Services:     Active Member of Clubs or Organizations:     Attends Club or Organization Meetings:     Marital Status:    Intimate Partner Violence:     Fear of Current or Ex-Partner:     Emotionally Abused:     Physically Abused:     Sexually Abused:         SURGICAL HISTORY  Past Surgical History:   Procedure Laterality Date   [address] ARTHROSCOPY Left 2013   [place] [id number]  2009 & 2014    Herniated disc repair    NECK SURGERY  2004    cervical disc repair                 CURRENT MEDICATIONS  Current Outpatient Medications   Medication Sig Dispense Refill    atorvastatin (LIPITOR) 40 MG tablet Take 1 tablet by mouth daily 90 tablet 1    levothyroxine (SYNTHROID) 50 MCG tablet Take 1 tablet by mouth daily 90 tablet 1    pantoprazole (PROTONIX) 40 MG tablet Take 1 tablet by mouth daily 90 tablet 1    valsartan-hydroCHLOROthiazide (DIOVAN-HCT) 160-12.5 MG per tablet Take 1 tablet by mouth daily 90 tablet 1    meclizine (ANTIVERT) 25 MG tablet Take 25 mg by mouth 3 times daily as needed (Patient not taking: Reported on 9/16/2021)       No current facility-administered medications for this visit. ALLERGIES  Allergies   Allergen Reactions    Codeine Other (See Comments)     hallucinations       PHYSICAL EXAM    /72   Pulse 67   Resp 16   Ht 5' 7\" (1.702 m)   Wt 215 lb (97.5 kg)   SpO2 94%   BMI 33.67 kg/m²     Physical Exam  Constitutional:       Appearance: Normal appearance. HENT:      Head: Normocephalic and atraumatic. Eyes:      Extraocular Movements: Extraocular movements intact. Pupils: Pupils are equal, round, and reactive to light. Cardiovascular:      Rate and Rhythm: Normal rate and regular rhythm. Pulses: Normal pulses. Heart sounds: No murmur heard. No friction rub. No gallop. Skin:     General: Skin is warm and dry. Neurological:      General: No focal deficit present. Mental Status: She is alert. Psychiatric:         Mood and Affect: Mood normal.         Behavior: Behavior normal.         ASSESSMENT & PLAN    1. Primary osteoarthritis of right shoulder  -Continue current pain regimen. Well-controlled  - methylPREDNISolone acetate (DEPO-MEDROL) injection 40 mg    2. Hyperlipidemia, unspecified hyperlipidemia type  -Excellent control on current regimen  - atorvastatin (LIPITOR) 40 MG tablet; Take 1 tablet by mouth daily  Dispense: 90 tablet; Refill: 1  - Lipid, Fasting; Future    3. IFG (impaired fasting glucose)  -Continue to monitor annually  - Hemoglobin A1C; Future    4. Gastroesophageal reflux disease, unspecified whether esophagitis present  -Stable on current regimen  - pantoprazole (PROTONIX) 40 MG tablet; Take 1 tablet by mouth daily  Dispense: 90 tablet; Refill: 1    5.  Hypothyroidism, unspecified type  -Continue current regimen recent TSH within normal limits  - levothyroxine (SYNTHROID) 50 MCG tablet; Take 1 tablet by mouth daily  Dispense: 90 tablet; Refill: 1    6. Essential hypertension  -Well-controlled on current regimen  - valsartan-hydroCHLOROthiazide (DIOVAN-HCT) 160-12.5 MG per tablet; Take 1 tablet by mouth daily  Dispense: 90 tablet; Refill: 1    7. Need for vaccination  - INFLUENZA, QUADV, ADJUVANTED, 65 YRS =, IM, PF, PREFILL SYR, 0.5ML (FLUAD)      Return in about 6 months (around 3/16/2022).          Electronically signed by Mary Roger DO on 9/26/2021

## 2022-03-16 DIAGNOSIS — E78.5 HYPERLIPIDEMIA, UNSPECIFIED HYPERLIPIDEMIA TYPE: ICD-10-CM

## 2022-03-16 DIAGNOSIS — R73.01 IFG (IMPAIRED FASTING GLUCOSE): ICD-10-CM

## 2022-03-16 LAB
CHOLESTEROL, FASTING: 157 MG/DL (ref 0–199)
HDLC SERPL-MCNC: 58 MG/DL (ref 40–60)
LDL CHOLESTEROL CALCULATED: 66 MG/DL
TRIGLYCERIDE, FASTING: 165 MG/DL (ref 0–150)
VLDLC SERPL CALC-MCNC: 33 MG/DL

## 2022-03-17 LAB
ESTIMATED AVERAGE GLUCOSE: 108.3 MG/DL
HBA1C MFR BLD: 5.4 %

## 2022-03-21 ENCOUNTER — OFFICE VISIT (OUTPATIENT)
Dept: FAMILY MEDICINE CLINIC | Age: 72
End: 2022-03-21
Payer: MEDICARE

## 2022-03-21 VITALS
HEART RATE: 81 BPM | DIASTOLIC BLOOD PRESSURE: 78 MMHG | SYSTOLIC BLOOD PRESSURE: 132 MMHG | BODY MASS INDEX: 33.74 KG/M2 | HEIGHT: 67 IN | OXYGEN SATURATION: 97 % | WEIGHT: 215 LBS

## 2022-03-21 DIAGNOSIS — E03.9 HYPOTHYROIDISM, UNSPECIFIED TYPE: ICD-10-CM

## 2022-03-21 DIAGNOSIS — B35.1 ONYCHOMYCOSIS OF GREAT TOE: ICD-10-CM

## 2022-03-21 DIAGNOSIS — K21.9 GASTROESOPHAGEAL REFLUX DISEASE, UNSPECIFIED WHETHER ESOPHAGITIS PRESENT: ICD-10-CM

## 2022-03-21 DIAGNOSIS — I10 ESSENTIAL HYPERTENSION: ICD-10-CM

## 2022-03-21 DIAGNOSIS — R73.01 IFG (IMPAIRED FASTING GLUCOSE): Primary | ICD-10-CM

## 2022-03-21 DIAGNOSIS — E78.5 HYPERLIPIDEMIA, UNSPECIFIED HYPERLIPIDEMIA TYPE: ICD-10-CM

## 2022-03-21 LAB
ALBUMIN SERPL-MCNC: 4.7 G/DL (ref 3.4–5)
ALP BLD-CCNC: 88 U/L (ref 40–129)
ALT SERPL-CCNC: 34 U/L (ref 10–40)
AST SERPL-CCNC: 30 U/L (ref 15–37)
BILIRUB SERPL-MCNC: <0.2 MG/DL (ref 0–1)
BILIRUBIN DIRECT: <0.2 MG/DL (ref 0–0.3)
BILIRUBIN, INDIRECT: NORMAL MG/DL (ref 0–1)
TOTAL PROTEIN: 7.1 G/DL (ref 6.4–8.2)

## 2022-03-21 PROCEDURE — 3017F COLORECTAL CA SCREEN DOC REV: CPT | Performed by: FAMILY MEDICINE

## 2022-03-21 PROCEDURE — G8427 DOCREV CUR MEDS BY ELIG CLIN: HCPCS | Performed by: FAMILY MEDICINE

## 2022-03-21 PROCEDURE — 99213 OFFICE O/P EST LOW 20 MIN: CPT | Performed by: FAMILY MEDICINE

## 2022-03-21 PROCEDURE — 4040F PNEUMOC VAC/ADMIN/RCVD: CPT | Performed by: FAMILY MEDICINE

## 2022-03-21 PROCEDURE — 3288F FALL RISK ASSESSMENT DOCD: CPT | Performed by: FAMILY MEDICINE

## 2022-03-21 PROCEDURE — G8417 CALC BMI ABV UP PARAM F/U: HCPCS | Performed by: FAMILY MEDICINE

## 2022-03-21 PROCEDURE — G8484 FLU IMMUNIZE NO ADMIN: HCPCS | Performed by: FAMILY MEDICINE

## 2022-03-21 PROCEDURE — 1123F ACP DISCUSS/DSCN MKR DOCD: CPT | Performed by: FAMILY MEDICINE

## 2022-03-21 PROCEDURE — 1090F PRES/ABSN URINE INCON ASSESS: CPT | Performed by: FAMILY MEDICINE

## 2022-03-21 PROCEDURE — G8400 PT W/DXA NO RESULTS DOC: HCPCS | Performed by: FAMILY MEDICINE

## 2022-03-21 PROCEDURE — 1036F TOBACCO NON-USER: CPT | Performed by: FAMILY MEDICINE

## 2022-03-21 RX ORDER — PANTOPRAZOLE SODIUM 40 MG/1
40 TABLET, DELAYED RELEASE ORAL DAILY
Qty: 90 TABLET | Refills: 1 | Status: SHIPPED | OUTPATIENT
Start: 2022-03-21 | End: 2022-09-21 | Stop reason: SDUPTHER

## 2022-03-21 RX ORDER — VALSARTAN AND HYDROCHLOROTHIAZIDE 160; 12.5 MG/1; MG/1
1 TABLET, FILM COATED ORAL DAILY
Qty: 90 TABLET | Refills: 1 | Status: SHIPPED | OUTPATIENT
Start: 2022-03-21 | End: 2022-09-21 | Stop reason: SDUPTHER

## 2022-03-21 RX ORDER — LEVOTHYROXINE SODIUM 0.05 MG/1
50 TABLET ORAL DAILY
Qty: 90 TABLET | Refills: 1 | Status: SHIPPED | OUTPATIENT
Start: 2022-03-21 | End: 2022-09-21 | Stop reason: SDUPTHER

## 2022-03-21 RX ORDER — TERBINAFINE HYDROCHLORIDE 250 MG/1
250 TABLET ORAL DAILY
Qty: 84 TABLET | Refills: 0 | Status: SHIPPED | OUTPATIENT
Start: 2022-03-21 | End: 2022-06-13

## 2022-03-21 RX ORDER — ATORVASTATIN CALCIUM 40 MG/1
40 TABLET, FILM COATED ORAL DAILY
Qty: 90 TABLET | Refills: 1 | Status: SHIPPED | OUTPATIENT
Start: 2022-03-21 | End: 2022-09-21 | Stop reason: SDUPTHER

## 2022-03-21 ASSESSMENT — PATIENT HEALTH QUESTIONNAIRE - PHQ9
SUM OF ALL RESPONSES TO PHQ9 QUESTIONS 1 & 2: 0
SUM OF ALL RESPONSES TO PHQ QUESTIONS 1-9: 0
1. LITTLE INTEREST OR PLEASURE IN DOING THINGS: 0
SUM OF ALL RESPONSES TO PHQ QUESTIONS 1-9: 0
2. FEELING DOWN, DEPRESSED OR HOPELESS: 0
SUM OF ALL RESPONSES TO PHQ QUESTIONS 1-9: 0
SUM OF ALL RESPONSES TO PHQ QUESTIONS 1-9: 0

## 2022-03-21 NOTE — PROGRESS NOTES
3/21/2022     Sienna Pita      Chief Complaint   Patient presents with    6 Month Follow-Up     pt reports no concerns       HPI      Tung Rayo is a 66 y.o. female who presents today with the followin. IFG (impaired fasting glucose)    2. Hyperlipidemia, unspecified hyperlipidemia type    3. Hypothyroidism, unspecified type    4. Gastroesophageal reflux disease, unspecified whether esophagitis present    5. Essential hypertension    6.  Onychomycosis of right great toe    She is here for 6-month follow-up  She continues to do well she has no current complaints    REVIEW OF SYMPTOMS    Review of Systems   Cardiovascular:        History of hypertension and hyperlipidemia well-controlled on current diet and medication   Endocrine:        Impaired fasting glucose hypothyroidism stable   Skin:        Deformity chronic of the left great toenail       PAST MEDICAL HISTORY  Past Medical History:   Diagnosis Date    Essential hypertension 2019    GERD (gastroesophageal reflux disease) 2019    Hyperlipidemia 2019    Hypothyroidism 2019       FAMILY HISTORY  Family History   Problem Relation Age of Onset    Other Mother         unknown cardiac    Other Father         unknown cardiac    Other Sister         cardiac       SOCIAL HISTORY  Social History     Socioeconomic History    Marital status:      Spouse name: None    Number of children: None    Years of education: None    Highest education level: None   Occupational History    None   Tobacco Use    Smoking status: Never Smoker    Smokeless tobacco: Never Used   Substance and Sexual Activity    Alcohol use: Never    Drug use: Never    Sexual activity: None   Other Topics Concern    None   Social History Narrative    None     Social Determinants of Health     Financial Resource Strain:     Difficulty of Paying Living Expenses: Not on file   Food Insecurity:     Worried About Running Out of Food in the Last Year: Not on file  Ran Out of Food in the Last Year: Not on file   Transportation Needs:     Lack of Transportation (Medical): Not on file    Lack of Transportation (Non-Medical):  Not on file   Physical Activity:     Days of Exercise per Week: Not on file    Minutes of Exercise per Session: Not on file   Stress:     Feeling of Stress : Not on file   Social Connections:     Frequency of Communication with Friends and Family: Not on file    Frequency of Social Gatherings with Friends and Family: Not on file    Attends Shinto Services: Not on file    Active Member of [address] [place] [address] or Organizations: Not on file    Attends Club or Organization Meetings: Not on file    Marital Status: Not on file   Intimate Partner Violence:     Fear of Current or Ex-Partner: Not on file    Emotionally Abused: Not on file    Physically Abused: Not on file    Sexually Abused: Not on file   Housing Stability:     Unable to Pay for Housing in the Last Year: Not on file    Number of [place] in the Last Year: Not on file    Unstable Housing in the Last Year: Not on file        SURGICAL HISTORY  Past Surgical History:   Procedure Laterality Date   [address] ARTHROSCOPY Left 2013   [id number]  2009 & 2014    Herniated disc repair    NECK SURGERY  2004    cervical disc repair       CURRENT MEDICATIONS  Current Outpatient Medications   Medication Sig Dispense Refill    atorvastatin (LIPITOR) 40 MG tablet Take 1 tablet by mouth daily 90 tablet 1    levothyroxine (SYNTHROID) 50 MCG tablet Take 1 tablet by mouth daily 90 tablet 1    pantoprazole (PROTONIX) 40 MG tablet Take 1 tablet by mouth daily 90 tablet 1    valsartan-hydroCHLOROthiazide (DIOVAN-HCT) 160-12.5 MG per tablet Take 1 tablet by mouth daily 90 tablet 1    terbinafine (LAMISIL) 250 MG tablet Take 1 tablet by mouth daily 84 tablet 0    meclizine (ANTIVERT) 25 MG tablet Take 25 mg by mouth 3 times daily as needed        No current facility-administered medications for this visit. ALLERGIES  Allergies   Allergen Reactions    Codeine Other (See Comments)     hallucinations       PHYSICAL EXAM    /78 (Site: Right Upper Arm, Position: Sitting, Cuff Size: Large Adult)   Pulse 81   Ht 5' 7\" (1.702 m)   Wt 215 lb (97.5 kg)   SpO2 97%   BMI 33.67 kg/m²     Physical Exam  Vitals and nursing note reviewed. Constitutional:       Appearance: Normal appearance. Cardiovascular:      Rate and Rhythm: Normal rate. Pulmonary:      Effort: Pulmonary effort is normal.   Skin:     Comments: Deformity of partial lysis of the toenail great toe left foot         ASSESSMENT and PLAN    Christine ANSARI was seen today for 6 month follow-up. Diagnoses and all orders for this visit:    IFG (impaired fasting glucose)  -     Hemoglobin A1C; Future    Hyperlipidemia, unspecified hyperlipidemia type  -     atorvastatin (LIPITOR) 40 MG tablet; Take 1 tablet by mouth daily  -     Hepatic Function Panel; Future  -     Comprehensive Metabolic Panel; Future  -     Lipid Panel; Future    Hypothyroidism, unspecified type  -     levothyroxine (SYNTHROID) 50 MCG tablet; Take 1 tablet by mouth daily    Gastroesophageal reflux disease, unspecified whether esophagitis present  -     pantoprazole (PROTONIX) 40 MG tablet; Take 1 tablet by mouth daily    Essential hypertension  -     valsartan-hydroCHLOROthiazide (DIOVAN-HCT) 160-12.5 MG per tablet; Take 1 tablet by mouth daily    Onychomycosis of right great toe    Other orders  -     terbinafine (LAMISIL) 250 MG tablet; Take 1 tablet by mouth daily    I am getting a hepatic profile today if this is okay she could start Lamisil 250 mg daily for 90 days  For other medicine the same and follow-up here in 6 months    Return in about 6 months (around 9/21/2022). Electronically signed by Emmett Jefferson MD on 3/21/2022    Please note that this chart was generated using dragon dictation software.   Although every effort was made to ensure the accuracy of this automated transcription, some errors in transcription may have occurred.

## 2022-03-24 ENCOUNTER — TELEPHONE (OUTPATIENT)
Dept: FAMILY MEDICINE CLINIC | Age: 72
End: 2022-03-24

## 2022-08-03 ENCOUNTER — TELEMEDICINE (OUTPATIENT)
Dept: FAMILY MEDICINE CLINIC | Age: 72
End: 2022-08-03
Payer: MEDICARE

## 2022-08-03 DIAGNOSIS — Z00.00 MEDICARE ANNUAL WELLNESS VISIT, SUBSEQUENT: Primary | ICD-10-CM

## 2022-08-03 PROCEDURE — 3017F COLORECTAL CA SCREEN DOC REV: CPT | Performed by: FAMILY MEDICINE

## 2022-08-03 PROCEDURE — G0439 PPPS, SUBSEQ VISIT: HCPCS | Performed by: FAMILY MEDICINE

## 2022-08-03 PROCEDURE — 1123F ACP DISCUSS/DSCN MKR DOCD: CPT | Performed by: FAMILY MEDICINE

## 2022-08-03 SDOH — ECONOMIC STABILITY: FOOD INSECURITY: WITHIN THE PAST 12 MONTHS, THE FOOD YOU BOUGHT JUST DIDN'T LAST AND YOU DIDN'T HAVE MONEY TO GET MORE.: NEVER TRUE

## 2022-08-03 SDOH — ECONOMIC STABILITY: FOOD INSECURITY: WITHIN THE PAST 12 MONTHS, YOU WORRIED THAT YOUR FOOD WOULD RUN OUT BEFORE YOU GOT MONEY TO BUY MORE.: NEVER TRUE

## 2022-08-03 ASSESSMENT — LIFESTYLE VARIABLES
HOW MANY STANDARD DRINKS CONTAINING ALCOHOL DO YOU HAVE ON A TYPICAL DAY: PATIENT DOES NOT DRINK
HOW OFTEN DO YOU HAVE A DRINK CONTAINING ALCOHOL: NEVER

## 2022-08-03 ASSESSMENT — SOCIAL DETERMINANTS OF HEALTH (SDOH): HOW HARD IS IT FOR YOU TO PAY FOR THE VERY BASICS LIKE FOOD, HOUSING, MEDICAL CARE, AND HEATING?: SOMEWHAT HARD

## 2022-08-03 ASSESSMENT — PATIENT HEALTH QUESTIONNAIRE - PHQ9
SUM OF ALL RESPONSES TO PHQ QUESTIONS 1-9: 0
1. LITTLE INTEREST OR PLEASURE IN DOING THINGS: 0
SUM OF ALL RESPONSES TO PHQ9 QUESTIONS 1 & 2: 0
2. FEELING DOWN, DEPRESSED OR HOPELESS: 0
SUM OF ALL RESPONSES TO PHQ QUESTIONS 1-9: 0

## 2022-08-03 NOTE — PATIENT INSTRUCTIONS
Personalized Preventive Plan for Nate Miranda - 8/3/2022  Medicare offers a range of preventive health benefits. Some of the tests and screenings are paid in full while other may be subject to a deductible, co-insurance, and/or copay. Some of these benefits include a comprehensive review of your medical history including lifestyle, illnesses that may run in your family, and various assessments and screenings as appropriate. After reviewing your medical record and screening and assessments performed today your provider may have ordered immunizations, labs, imaging, and/or referrals for you. A list of these orders (if applicable) as well as your Preventive Care list are included within your After Visit Summary for your review. Other Preventive Recommendations:    A preventive eye exam performed by an eye specialist is recommended every 1-2 years to screen for glaucoma; cataracts, macular degeneration, and other eye disorders. A preventive dental visit is recommended every 6 months. Try to get at least 150 minutes of exercise per week or 10,000 steps per day on a pedometer . Order or download the FREE \"Exercise & Physical Activity: Your Everyday Guide\" from [employer]. Call 2-354.473.1715 or search [employer] online. You need 9876-1190 mg of calcium and 9149-8528 IU of vitamin D per day. It is possible to meet your calcium requirement with diet alone, but a vitamin D supplement is usually necessary to meet this goal.  When exposed to the sun, use a sunscreen that protects against both UVA and UVB radiation with an SPF of 30 or greater. Reapply every 2 to 3 hours or after sweating, drying off with a towel, or swimming. Always wear a seat belt when traveling in a car. Always wear a helmet when riding a bicycle or motorcycle.

## 2022-08-03 NOTE — PROGRESS NOTES
stress  Anger: patient's comments regarding reasons for stress and/or anger: states stress from her neighbor causes some anger  No Living Will: ACP documents already completed- patient asked to provide copy to the office    Health Habits/Nutrition:  Physical Activity: Inactive    Days of Exercise per Week: 0 days    Minutes of Exercise per Session: 0 min     Have you lost any weight without trying in the past 3 months?: No     Have you seen the dentist within the past year?: Yes  Health Habits/Nutrition Interventions:  Inadequate physical activity:  patient is not ready to increase his/her physical activity level at this time  Nutritional issues:  patient is not ready to address his/her nutritional/weight issues at this time    Hearing/Vision:  Do you or your family notice any trouble with your hearing that hasn't been managed with hearing aids?: No  Do you have difficulty driving, watching TV, or doing any of your daily activities because of your eyesight?: No  Have you had an eye exam within the past year?: (!) No  No results found. Hearing/Vision Interventions:  Vision concerns:  patient encouraged to make appointment with his/her eye specialist            Objective      Patient-Reported Vitals  No data recorded        Unable to obtain 3 vital signs due to patient not having equipment to take blood pressure/temperature. Allergies   Allergen Reactions    Codeine Other (See Comments)     hallucinations     Prior to Visit Medications    Medication Sig Taking?  Authorizing Provider   atorvastatin (LIPITOR) 40 MG tablet Take 1 tablet by mouth daily Yes Karan Ernandez MD   levothyroxine (SYNTHROID) 50 MCG tablet Take 1 tablet by mouth daily Yes Karan Ernandez MD   pantoprazole (PROTONIX) 40 MG tablet Take 1 tablet by mouth daily Yes Karan Ernandez MD   valsartan-hydroCHLOROthiazide (DIOVAN-HCT) 160-12.5 MG per tablet Take 1 tablet by mouth daily Yes Karan Ernandez MD   meclizine (ANTIVERT) 25 MG tablet Take 25 mg by mouth 3 times daily as needed   Patient not taking: Reported on 8/3/2022  Historical Provider, MD Caceres (Including outside providers/suppliers regularly involved in providing care):   Patient Care Team:  Tavares Pompa MD as PCP - General (Family Medicine)  Tavares Pompa [facility]     Reviewed and updated this visit:  Allergies  Meds  Med Hx  Surg Hx  Soc Hx  Fam Hx          . Doris Amezcua LPN, [birth year], performed the documented evaluation under the direct supervision of the attending physician. Godwin Brock, was evaluated through a synchronous (real-time) audio encounter. The patient (or guardian if applicable) is aware that this is a billable service, which includes applicable co-pays. This Virtual Visit was conducted with patient's (and/or legal guardian's) consent. The visit was conducted pursuant to the emergency declaration under the [address] [place] [address], [address] [place] [address] authority and the [employer] and [employer]. Patient identification was verified, and a caregiver was present when appropriate. The patient was located at Home: [place] [id number]. Provider was located at [facility] ([address] [place] [address]): [address] [place] [address]. [place] [address]. Total time spent for this encounter: Not billed by time    --Liyah Benito LPN on 7/1/3631 at 5:55 PM    An electronic signature was used to authenticate this note. This encounter was performed under my, Marietta Clark, direct supervision, 8/3/2022.

## 2022-09-19 DIAGNOSIS — R73.01 IFG (IMPAIRED FASTING GLUCOSE): ICD-10-CM

## 2022-09-19 DIAGNOSIS — E78.5 HYPERLIPIDEMIA, UNSPECIFIED HYPERLIPIDEMIA TYPE: ICD-10-CM

## 2022-09-19 LAB
A/G RATIO: 2.2 (ref 1.1–2.2)
ALBUMIN SERPL-MCNC: 4.9 G/DL (ref 3.4–5)
ALP BLD-CCNC: 70 U/L (ref 40–129)
ALT SERPL-CCNC: 28 U/L (ref 10–40)
ANION GAP SERPL CALCULATED.3IONS-SCNC: 15 MMOL/L (ref 3–16)
AST SERPL-CCNC: 22 U/L (ref 15–37)
BILIRUB SERPL-MCNC: 0.5 MG/DL (ref 0–1)
BILIRUBIN DIRECT: <0.2 MG/DL (ref 0–0.3)
BILIRUBIN, INDIRECT: NORMAL MG/DL (ref 0–1)
BUN BLDV-MCNC: 20 MG/DL (ref 7–20)
CALCIUM SERPL-MCNC: 10.1 MG/DL (ref 8.3–10.6)
CHLORIDE BLD-SCNC: 103 MMOL/L (ref 99–110)
CHOLESTEROL, TOTAL: 131 MG/DL (ref 0–199)
CO2: 22 MMOL/L (ref 21–32)
CREAT SERPL-MCNC: 1 MG/DL (ref 0.6–1.2)
GFR AFRICAN AMERICAN: >60
GFR NON-AFRICAN AMERICAN: 54
GLUCOSE BLD-MCNC: 95 MG/DL (ref 70–99)
HDLC SERPL-MCNC: 69 MG/DL (ref 40–60)
LDL CHOLESTEROL CALCULATED: 48 MG/DL
POTASSIUM SERPL-SCNC: 4.2 MMOL/L (ref 3.5–5.1)
SODIUM BLD-SCNC: 140 MMOL/L (ref 136–145)
TOTAL PROTEIN: 7.1 G/DL (ref 6.4–8.2)
TRIGL SERPL-MCNC: 70 MG/DL (ref 0–150)
VLDLC SERPL CALC-MCNC: 14 MG/DL

## 2022-09-20 LAB
ESTIMATED AVERAGE GLUCOSE: 102.5 MG/DL
HBA1C MFR BLD: 5.2 %

## 2022-09-21 ENCOUNTER — OFFICE VISIT (OUTPATIENT)
Dept: FAMILY MEDICINE CLINIC | Age: 72
End: 2022-09-21
Payer: MEDICARE

## 2022-09-21 VITALS
SYSTOLIC BLOOD PRESSURE: 134 MMHG | HEART RATE: 74 BPM | BODY MASS INDEX: 32.62 KG/M2 | DIASTOLIC BLOOD PRESSURE: 84 MMHG | HEIGHT: 67 IN | WEIGHT: 207.8 LBS | OXYGEN SATURATION: 97 %

## 2022-09-21 DIAGNOSIS — N18.31 STAGE 3A CHRONIC KIDNEY DISEASE (HCC): ICD-10-CM

## 2022-09-21 DIAGNOSIS — E78.5 HYPERLIPIDEMIA, UNSPECIFIED HYPERLIPIDEMIA TYPE: ICD-10-CM

## 2022-09-21 DIAGNOSIS — K21.9 GASTROESOPHAGEAL REFLUX DISEASE, UNSPECIFIED WHETHER ESOPHAGITIS PRESENT: ICD-10-CM

## 2022-09-21 DIAGNOSIS — Z23 ENCOUNTER FOR IMMUNIZATION: Primary | ICD-10-CM

## 2022-09-21 DIAGNOSIS — I10 ESSENTIAL HYPERTENSION: ICD-10-CM

## 2022-09-21 DIAGNOSIS — E03.9 HYPOTHYROIDISM, UNSPECIFIED TYPE: ICD-10-CM

## 2022-09-21 PROBLEM — N18.30 CHRONIC RENAL DISEASE, STAGE III (HCC): Status: ACTIVE | Noted: 2022-09-21

## 2022-09-21 PROCEDURE — G8427 DOCREV CUR MEDS BY ELIG CLIN: HCPCS | Performed by: PHYSICIAN ASSISTANT

## 2022-09-21 PROCEDURE — G8417 CALC BMI ABV UP PARAM F/U: HCPCS | Performed by: PHYSICIAN ASSISTANT

## 2022-09-21 PROCEDURE — 1090F PRES/ABSN URINE INCON ASSESS: CPT | Performed by: PHYSICIAN ASSISTANT

## 2022-09-21 PROCEDURE — 90677 PCV20 VACCINE IM: CPT | Performed by: PHYSICIAN ASSISTANT

## 2022-09-21 PROCEDURE — 3017F COLORECTAL CA SCREEN DOC REV: CPT | Performed by: PHYSICIAN ASSISTANT

## 2022-09-21 PROCEDURE — 1123F ACP DISCUSS/DSCN MKR DOCD: CPT | Performed by: PHYSICIAN ASSISTANT

## 2022-09-21 PROCEDURE — 90471 IMMUNIZATION ADMIN: CPT | Performed by: PHYSICIAN ASSISTANT

## 2022-09-21 PROCEDURE — G9899 SCRN MAM PERF RSLTS DOC: HCPCS | Performed by: PHYSICIAN ASSISTANT

## 2022-09-21 PROCEDURE — 1036F TOBACCO NON-USER: CPT | Performed by: PHYSICIAN ASSISTANT

## 2022-09-21 PROCEDURE — G8400 PT W/DXA NO RESULTS DOC: HCPCS | Performed by: PHYSICIAN ASSISTANT

## 2022-09-21 PROCEDURE — 99213 OFFICE O/P EST LOW 20 MIN: CPT | Performed by: PHYSICIAN ASSISTANT

## 2022-09-21 RX ORDER — ATORVASTATIN CALCIUM 40 MG/1
40 TABLET, FILM COATED ORAL DAILY
Qty: 90 TABLET | Refills: 1 | Status: SHIPPED | OUTPATIENT
Start: 2022-09-21

## 2022-09-21 RX ORDER — LEVOTHYROXINE SODIUM 0.05 MG/1
50 TABLET ORAL DAILY
Qty: 90 TABLET | Refills: 1 | Status: SHIPPED | OUTPATIENT
Start: 2022-09-21

## 2022-09-21 RX ORDER — VALSARTAN AND HYDROCHLOROTHIAZIDE 160; 12.5 MG/1; MG/1
1 TABLET, FILM COATED ORAL DAILY
Qty: 90 TABLET | Refills: 1 | Status: SHIPPED | OUTPATIENT
Start: 2022-09-21

## 2022-09-21 RX ORDER — PANTOPRAZOLE SODIUM 40 MG/1
40 TABLET, DELAYED RELEASE ORAL DAILY
Qty: 90 TABLET | Refills: 1 | Status: SHIPPED | OUTPATIENT
Start: 2022-09-21

## 2022-09-21 NOTE — PROGRESS NOTES
9/21/2022    Sil Corado    Chief Complaint   Patient presents with    6 Month Follow-Up       HPI  History was obtained from pt. Maureen Bahena is a 66 y.o. female with a PMHx as listed below who presents today for 6 month follow up. Doing very well. Staying active. Pt lost 8 pounds since march. Labs look great, no issues with her meds, need refilled. Pt got covid booster and flu shot two weeks ago. Seems to need a pneumonia shot. 1. Encounter for immunization    2. Hyperlipidemia, unspecified hyperlipidemia type    3. Hypothyroidism, unspecified type    4. Gastroesophageal reflux disease, unspecified whether esophagitis present    5. Essential hypertension    6.  Stage 3a chronic kidney disease ([facility] [address]         REVIEW OF SYMPTOMS    Review of Systems    PAST MEDICAL HISTORY  Past Medical History:   Diagnosis Date    Essential hypertension 7/20/2019    GERD (gastroesophageal reflux disease) 7/20/2019    Hyperlipidemia 7/20/2019    Hypothyroidism 7/20/2019       FAMILY HISTORY  Family History   Problem Relation Age of Onset    Other Mother         unknown cardiac    Other Father         unknown cardiac    Heart Disease Sister     Pacemaker Sister        SOCIAL HISTORY  Social History     Socioeconomic History    Marital status:    Tobacco Use    Smoking status: Never    Smokeless tobacco: Never   Substance and Sexual Activity    Alcohol use: Never    Drug use: Never     Social Determinants of Health     Financial Resource Strain: Medium Risk    Difficulty of Paying Living Expenses: Somewhat hard   Food Insecurity: No Food Insecurity    Worried About Running Out of Food in the Last Year: Never true    Ran Out of Food in the Last Year: Never true   Physical Activity: Inactive    Days of Exercise per Week: 0 days    Minutes of Exercise per Session: 0 min        SURGICAL HISTORY  Past Surgical History:   Procedure Laterality Date    HYSTERECTOMY (CERVIX STATUS UNKNOWN)  1970    KNEE ARTHROSCOPY Left 2013 LUMBAR SPINE SURGERY  2009 & 2014    Herniated disc repair    NECK SURGERY  2004    cervical disc repair                 CURRENT MEDICATIONS  Current Outpatient Medications   Medication Sig Dispense Refill    atorvastatin (LIPITOR) 40 MG tablet Take 1 tablet by mouth daily 90 tablet 1    levothyroxine (SYNTHROID) 50 MCG tablet Take 1 tablet by mouth daily 90 tablet 1    pantoprazole (PROTONIX) 40 MG tablet Take 1 tablet by mouth daily 90 tablet 1    valsartan-hydroCHLOROthiazide (DIOVAN-HCT) 160-12.5 MG per tablet Take 1 tablet by mouth daily 90 tablet 1    meclizine (ANTIVERT) 25 MG tablet Take 25 mg by mouth 3 times daily as needed  (Patient not taking: No sig reported)       No current facility-administered medications for this visit. ALLERGIES  Allergies   Allergen Reactions    Codeine Other (See Comments)     hallucinations       PHYSICAL EXAM    /84 (Site: Left Upper Arm, Position: Sitting, Cuff Size: Medium Adult)   Pulse 74   Ht 5' 7\" (1.702 m)   Wt 207 lb 12.8 oz (94.3 kg)   SpO2 97%   BMI 32.55 kg/m²     Physical Exam  Constitutional:       Appearance: Normal appearance. She is obese. HENT:      Head: Normocephalic and atraumatic. Right Ear: Tympanic membrane and external ear normal.      Left Ear: Tympanic membrane and external ear normal.      Nose: No rhinorrhea. Mouth/Throat:      Mouth: Mucous membranes are moist.      Pharynx: Oropharynx is clear. No oropharyngeal exudate or posterior oropharyngeal erythema. Eyes:      General: No scleral icterus. Extraocular Movements: Extraocular movements intact. Conjunctiva/sclera: Conjunctivae normal.      Pupils: Pupils are equal, round, and reactive to light. Cardiovascular:      Rate and Rhythm: Normal rate and regular rhythm. Pulses: Normal pulses. Heart sounds: Normal heart sounds. No murmur heard. No friction rub. No gallop.    Pulmonary:      Effort: Pulmonary effort is normal.      Breath sounds: Normal breath sounds. No wheezing, rhonchi or rales. Abdominal:      General: Bowel sounds are normal. There is no distension. Palpations: Abdomen is soft. There is no mass. Tenderness: There is no abdominal tenderness. There is no right CVA tenderness, left CVA tenderness, guarding or rebound. Musculoskeletal:         General: No deformity. Normal range of motion. Cervical back: Normal range of motion and neck supple. No rigidity. No muscular tenderness. Right lower leg: No edema. Left lower leg: No edema. Skin:     General: Skin is warm and dry. Capillary Refill: Capillary refill takes less than 2 seconds. Findings: No bruising, erythema or rash. Neurological:      General: No focal deficit present. Mental Status: She is alert and oriented to person, place, and time. Coordination: Coordination normal.      Gait: Gait normal.   Psychiatric:         Mood and Affect: Mood normal.         Behavior: Behavior normal.       ASSESSMENT & PLAN    1. Hyperlipidemia, unspecified hyperlipidemia type  Wc continue regimen  - atorvastatin (LIPITOR) 40 MG tablet; Take 1 tablet by mouth daily  Dispense: 90 tablet; Refill: 1    2. Hypothyroidism, unspecified type  Continue regimen  - levothyroxine (SYNTHROID) 50 MCG tablet; Take 1 tablet by mouth daily  Dispense: 90 tablet; Refill: 1    3. Gastroesophageal reflux disease, unspecified whether esophagitis present  stable  - pantoprazole (PROTONIX) 40 MG tablet; Take 1 tablet by mouth daily  Dispense: 90 tablet; Refill: 1    4. Essential hypertension  Wc continue regimen  - valsartan-hydroCHLOROthiazide (DIOVAN-HCT) 160-12.5 MG per tablet; Take 1 tablet by mouth daily  Dispense: 90 tablet; Refill: 1    5. Encounter for immunization  Final pna shot  - Pneumococcal, PCV20, PREVNAR 20, (age 25 yrs+), IM, PF    6. Stage 3a chronic kidney disease ([facility] [address]  stable    Return in about 6 months (around 3/21/2023).          Electronically signed by GOLDY Fleming on 9/21/2022      Comment: Please note this report has been produced using speech recognition software and may contain errors related to that system including errors in grammar, punctuation, and spelling, as well as words and phrases that may be inappropriate. If there are any questions or concerns please feel free to contact the dictating provider for clarification.

## 2022-11-28 ENCOUNTER — OFFICE VISIT (OUTPATIENT)
Dept: FAMILY MEDICINE CLINIC | Age: 72
End: 2022-11-28
Payer: MEDICARE

## 2022-11-28 VITALS
OXYGEN SATURATION: 98 % | SYSTOLIC BLOOD PRESSURE: 120 MMHG | WEIGHT: 207.4 LBS | DIASTOLIC BLOOD PRESSURE: 70 MMHG | BODY MASS INDEX: 32.55 KG/M2 | HEART RATE: 87 BPM | HEIGHT: 67 IN

## 2022-11-28 DIAGNOSIS — Z87.828 HISTORY OF MENISCAL TEAR: ICD-10-CM

## 2022-11-28 DIAGNOSIS — G89.29 CHRONIC PAIN OF LEFT KNEE: Primary | ICD-10-CM

## 2022-11-28 DIAGNOSIS — M25.562 CHRONIC PAIN OF LEFT KNEE: Primary | ICD-10-CM

## 2022-11-28 PROCEDURE — G8484 FLU IMMUNIZE NO ADMIN: HCPCS | Performed by: PHYSICIAN ASSISTANT

## 2022-11-28 PROCEDURE — G8417 CALC BMI ABV UP PARAM F/U: HCPCS | Performed by: PHYSICIAN ASSISTANT

## 2022-11-28 PROCEDURE — 3017F COLORECTAL CA SCREEN DOC REV: CPT | Performed by: PHYSICIAN ASSISTANT

## 2022-11-28 PROCEDURE — 3074F SYST BP LT 130 MM HG: CPT | Performed by: PHYSICIAN ASSISTANT

## 2022-11-28 PROCEDURE — 3078F DIAST BP <80 MM HG: CPT | Performed by: PHYSICIAN ASSISTANT

## 2022-11-28 PROCEDURE — G9899 SCRN MAM PERF RSLTS DOC: HCPCS | Performed by: PHYSICIAN ASSISTANT

## 2022-11-28 PROCEDURE — 1036F TOBACCO NON-USER: CPT | Performed by: PHYSICIAN ASSISTANT

## 2022-11-28 PROCEDURE — 1090F PRES/ABSN URINE INCON ASSESS: CPT | Performed by: PHYSICIAN ASSISTANT

## 2022-11-28 PROCEDURE — G8400 PT W/DXA NO RESULTS DOC: HCPCS | Performed by: PHYSICIAN ASSISTANT

## 2022-11-28 PROCEDURE — 1123F ACP DISCUSS/DSCN MKR DOCD: CPT | Performed by: PHYSICIAN ASSISTANT

## 2022-11-28 PROCEDURE — 99213 OFFICE O/P EST LOW 20 MIN: CPT | Performed by: PHYSICIAN ASSISTANT

## 2022-11-28 PROCEDURE — G8427 DOCREV CUR MEDS BY ELIG CLIN: HCPCS | Performed by: PHYSICIAN ASSISTANT

## 2022-11-28 RX ORDER — MELOXICAM 7.5 MG/1
7.5 TABLET ORAL DAILY
Qty: 90 TABLET | Refills: 0 | Status: SHIPPED | OUTPATIENT
Start: 2022-11-28

## 2022-11-28 NOTE — PROGRESS NOTES
11/28/2022    Aretha Start    Chief Complaint   Patient presents with    Knee Pain     Left knee , pt states muscle twitching in lower left leg , constant , has been getting lula. Injections every 3 months ,  was given exercises to do for arthritis pain but has worsened ( [address] ) , pt requests MRI    Groin Pain     Pt states she has to lift left leg with hands to sit which has caused constant groin pain        HPI  History was obtained from patient. Leila Lesches is a 66 y.o. female who presents today with complaints of chronic left knee pain. She does follow Ortho. She is supposed to be calling them for her routine office visit next month. She states that she first started seeing Anatoly Lerma in 6/2022 due to anterior left knee pain. She states that she had x-rays done which showed osteoarthritis. I do not have these records. She states that she received a cortisone injection which was very helpful. Approximately 1 month later, she also developed posterior left knee pain and this is what brought her to the office today. She followed up with Dr. Rudy Lerma in 9/2022 and this new pain was addressed. She states that she had an ultrasound of the posterior knee which did not reveal a cyst or any other unusual findings. She had another cortisone injection. She was provided home stretches because she was told that it was hamstring related. She states that the exercises worsened her pain. Ortho wanted her to do PT but she declined. She believes her posterior knee pain started when she was wearing a knee brace and walking 2 miles a day. She states that this knee brace put pressure on the back of her knee. She believes she has been compensating for the knee pain and now also has left upper groin pain. She denies numbness, tingling, buttock or low back pain, saddle anesthesia, or lower extremity weakness. She denies edema of the lower extremities.     She discontinued her home exercises/stretches because it seemed to worsen her pain. She has been trying ice, Tylenol, Voltaren gel, and [employer]. No alleviating factors identified. No aggravating factors identified either. She states that sometimes her pain is so bad that she has to use her hands just to lift her knee and get comfortable. She states that she had a left meniscal tear in 2013, had surgery in [place]. At that time, she was told that she had a cyst behind her knee. Most recent ultrasound reportedly did not show this cyst.  Again, I do not have these Ortho records. No hx of DVT/PE. No hx of RLS. No hx of low Mag. REVIEW OF SYMPTOMS    Constitutional:  Denies fever, chills, weight loss or weakness  Cardiovascular:  Denies chest pain, palpitations or swelling  Respiratory:  Denies cough or shortness of breath  GI:  Denies abdominal pain  Musculoskeletal: Admits left knee pain. See HPI.   Skin: Denies rashes  Neurologic:  Denies focal weakness, or sensory changes  Lymphatic:  Denies swollen glands    PAST MEDICAL HISTORY  Past Medical History:   Diagnosis Date    Essential hypertension 7/20/2019    GERD (gastroesophageal reflux disease) 7/20/2019    Hyperlipidemia 7/20/2019    Hypothyroidism 7/20/2019       FAMILY HISTORY  Family History   Problem Relation Age of Onset    Other Mother         unknown cardiac    Other Father         unknown cardiac    Heart Disease Sister     Pacemaker Sister        SOCIAL HISTORY  Social History     Socioeconomic History    Marital status:      Spouse name: None    Number of children: None    Years of education: None    Highest education level: None   Tobacco Use    Smoking status: Never    Smokeless tobacco: Never   Substance and Sexual Activity    Alcohol use: Never    Drug use: Never     Social Determinants of Health     Financial Resource Strain: Medium Risk    Difficulty of Paying Living Expenses: Somewhat hard   Food Insecurity: No Food Insecurity    Worried About Running Out of Food in the Last Year: Never true    Ran Out of Food in the Last Year: Never true   Physical Activity: Inactive    Days of Exercise per Week: 0 days    Minutes of Exercise per Session: 0 min        SURGICAL HISTORY  Past Surgical History:   Procedure Laterality Date    HYSTERECTOMY (CERVIX STATUS UNKNOWN)  1970    KNEE ARTHROSCOPY Left 2013    LUMBAR SPINE SURGERY  2009 & 2014    Herniated disc repair    NECK SURGERY  2004    cervical disc repair       CURRENT MEDICATIONS  Current Outpatient Medications   Medication Sig Dispense Refill    meloxicam (MOBIC) 7.5 MG tablet Take 1 tablet by mouth daily 90 tablet 0    atorvastatin (LIPITOR) 40 MG tablet Take 1 tablet by mouth daily 90 tablet 1    levothyroxine (SYNTHROID) 50 MCG tablet Take 1 tablet by mouth daily 90 tablet 1    pantoprazole (PROTONIX) 40 MG tablet Take 1 tablet by mouth daily 90 tablet 1    valsartan-hydroCHLOROthiazide (DIOVAN-HCT) 160-12.5 MG per tablet Take 1 tablet by mouth daily 90 tablet 1    meclizine (ANTIVERT) 25 MG tablet Take 25 mg by mouth 3 times daily as needed       No current facility-administered medications for this visit. ALLERGIES  Allergies   Allergen Reactions    Codeine Other (See Comments)     hallucinations       PHYSICAL EXAM    /70   Pulse 87   Ht 5' 7\" (1.702 m)   Wt 207 lb 6.4 oz (94.1 kg)   SpO2 98%   BMI 32.48 kg/m²     Constitutional:  Well developed, well nourished. Pleasant and cooperative. HENT:  Normocephalic, atraumatic  Eyes:  conjunctiva normal, no discharge, no scleral icterus  Cardiovascular:  Normal heart rate, normal rhythm, no murmurs, gallops or rubs  Thorax & Lungs:  Normal breath sounds, no respiratory distress, no wheezing, no rales, no rhonchi  Skin:  Warm, dry, no erythema, no rash  Extremities:  No edema, no tenderness, no cyanosis, calves are soft. Musculoskeletal:  Good range of motion left hip, left knee, left foot/ankle. No joint laxity.   Mild tenderness to palpation of the popliteal fossa. No anterior pain with palpation. No palpable abnormalities. Neurologic:  Alert & oriented  Psychiatric:  Affect normal, mood normal    ASSESSMENT & PLAN    Rebecca ANSARI was seen today for knee pain and groin pain. Diagnoses and all orders for this visit:    Chronic pain of left knee  -     meloxicam (MOBIC) 7.5 MG tablet; Take 1 tablet by mouth daily    History of meniscal tear     Patient was encouraged to continue to apply intermittent application of ice to the affected area. Initiate meloxicam 7.5 mg once daily with food. Avoid other NSAIDs. Okay to continue Tylenol as needed. Follow-up with Ortho to discuss worsening of knee pain. Due for routine Ortho visit anyways. Patient will call the office if Ortho refuses to order the MRI for her as that is what she is wanting. There are no discontinued medications. No follow-ups on file. Plan of care reviewed with patient who verbalizes understanding and wishes to continue. Did discuss Mobic and GFR. Pt understands risks. Please note that this chart was generated using dragon dictation software. Although every effort was made to ensure the accuracy of this automated transcription, some errors in transcription may have occurred.     Electronically signed by Didi Deutsch PA-C on 11/28/2022

## 2023-01-06 ENCOUNTER — HOSPITAL ENCOUNTER (EMERGENCY)
Age: 73
Discharge: HOME | End: 2023-01-06
Attending: EMERGENCY MEDICINE
Payer: MEDICARE

## 2023-01-06 ENCOUNTER — OFFICE VISIT (OUTPATIENT)
Dept: CARDIOLOGY CLINIC | Age: 73
End: 2023-01-06

## 2023-01-06 ENCOUNTER — APPOINTMENT (OUTPATIENT)
Dept: GENERAL RADIOLOGY | Age: 73
End: 2023-01-06
Payer: MEDICARE

## 2023-01-06 ENCOUNTER — APPOINTMENT (OUTPATIENT)
Dept: CT IMAGING | Age: 73
End: 2023-01-06
Payer: MEDICARE

## 2023-01-06 ENCOUNTER — NURSE ONLY (OUTPATIENT)
Dept: CARDIOLOGY CLINIC | Age: 73
End: 2023-01-06

## 2023-01-06 VITALS
HEIGHT: 67 IN | BODY MASS INDEX: 32.65 KG/M2 | HEART RATE: 88 BPM | SYSTOLIC BLOOD PRESSURE: 120 MMHG | DIASTOLIC BLOOD PRESSURE: 70 MMHG | WEIGHT: 208 LBS

## 2023-01-06 VITALS
DIASTOLIC BLOOD PRESSURE: 75 MMHG | WEIGHT: 207 LBS | HEART RATE: 71 BPM | BODY MASS INDEX: 32.49 KG/M2 | RESPIRATION RATE: 18 BRPM | SYSTOLIC BLOOD PRESSURE: 122 MMHG | TEMPERATURE: 97.9 F | HEIGHT: 67 IN | OXYGEN SATURATION: 100 %

## 2023-01-06 DIAGNOSIS — I20.0 UNSTABLE ANGINA PECTORIS (HCC): Primary | ICD-10-CM

## 2023-01-06 DIAGNOSIS — R07.9 ACUTE CHEST PAIN: Primary | ICD-10-CM

## 2023-01-06 DIAGNOSIS — I20.0 UNSTABLE ANGINA (HCC): Primary | ICD-10-CM

## 2023-01-06 LAB
ALBUMIN SERPL-MCNC: 4.1 GM/DL (ref 3.4–5)
ALP BLD-CCNC: 67 IU/L (ref 40–128)
ALT SERPL-CCNC: 38 U/L (ref 10–40)
ANION GAP SERPL CALCULATED.3IONS-SCNC: 12 MMOL/L (ref 4–16)
AST SERPL-CCNC: 29 IU/L (ref 15–37)
BASOPHILS ABSOLUTE: 0 K/CU MM
BASOPHILS RELATIVE PERCENT: 0.3 % (ref 0–1)
BILIRUB SERPL-MCNC: 0.4 MG/DL (ref 0–1)
BUN BLDV-MCNC: 15 MG/DL (ref 6–23)
CALCIUM SERPL-MCNC: 9.5 MG/DL (ref 8.3–10.6)
CHLORIDE BLD-SCNC: 104 MMOL/L (ref 99–110)
CO2: 25 MMOL/L (ref 21–32)
CREAT SERPL-MCNC: 0.9 MG/DL (ref 0.6–1.1)
DIFFERENTIAL TYPE: ABNORMAL
EOSINOPHILS ABSOLUTE: 0.3 K/CU MM
EOSINOPHILS RELATIVE PERCENT: 4.1 % (ref 0–3)
GFR SERPL CREATININE-BSD FRML MDRD: >60 ML/MIN/1.73M2
GLUCOSE BLD-MCNC: 96 MG/DL (ref 70–99)
HCT VFR BLD CALC: 39.5 % (ref 37–47)
HEMOGLOBIN: 13.1 GM/DL (ref 12.5–16)
IMMATURE NEUTROPHIL %: 0.2 % (ref 0–0.43)
LIPASE: 42 IU/L (ref 13–60)
LYMPHOCYTES ABSOLUTE: 2.7 K/CU MM
LYMPHOCYTES RELATIVE PERCENT: 44 % (ref 24–44)
MCH RBC QN AUTO: 32.3 PG (ref 27–31)
MCHC RBC AUTO-ENTMCNC: 33.2 % (ref 32–36)
MCV RBC AUTO: 97.3 FL (ref 78–100)
MONOCYTES ABSOLUTE: 0.4 K/CU MM
MONOCYTES RELATIVE PERCENT: 6.5 % (ref 0–4)
NUCLEATED RBC %: 0 %
PDW BLD-RTO: 12.5 % (ref 11.7–14.9)
PLATELET # BLD: 226 K/CU MM (ref 140–440)
PMV BLD AUTO: 9.7 FL (ref 7.5–11.1)
POTASSIUM SERPL-SCNC: 3.7 MMOL/L (ref 3.5–5.1)
PRO-BNP: 78.46 PG/ML
RBC # BLD: 4.06 M/CU MM (ref 4.2–5.4)
SEGMENTED NEUTROPHILS ABSOLUTE COUNT: 2.8 K/CU MM
SEGMENTED NEUTROPHILS RELATIVE PERCENT: 44.9 % (ref 36–66)
SODIUM BLD-SCNC: 141 MMOL/L (ref 135–145)
TOTAL IMMATURE NEUTOROPHIL: 0.01 K/CU MM
TOTAL NUCLEATED RBC: 0 K/CU MM
TOTAL PROTEIN: 6.6 GM/DL (ref 6.4–8.2)
TROPONIN T: <0.01 NG/ML
WBC # BLD: 6.1 K/CU MM (ref 4–10.5)

## 2023-01-06 PROCEDURE — 80053 COMPREHEN METABOLIC PANEL: CPT

## 2023-01-06 PROCEDURE — 84484 ASSAY OF TROPONIN QUANT: CPT

## 2023-01-06 PROCEDURE — 71045 X-RAY EXAM CHEST 1 VIEW: CPT

## 2023-01-06 PROCEDURE — 6370000000 HC RX 637 (ALT 250 FOR IP): Performed by: EMERGENCY MEDICINE

## 2023-01-06 PROCEDURE — 85025 COMPLETE CBC W/AUTO DIFF WBC: CPT

## 2023-01-06 PROCEDURE — 93005 ELECTROCARDIOGRAM TRACING: CPT | Performed by: EMERGENCY MEDICINE

## 2023-01-06 PROCEDURE — 71275 CT ANGIOGRAPHY CHEST: CPT

## 2023-01-06 PROCEDURE — 6360000004 HC RX CONTRAST MEDICATION: Performed by: EMERGENCY MEDICINE

## 2023-01-06 PROCEDURE — 83690 ASSAY OF LIPASE: CPT

## 2023-01-06 PROCEDURE — 99285 EMERGENCY DEPT VISIT HI MDM: CPT

## 2023-01-06 PROCEDURE — 83880 ASSAY OF NATRIURETIC PEPTIDE: CPT

## 2023-01-06 RX ORDER — ISOSORBIDE MONONITRATE 30 MG/1
30 TABLET, EXTENDED RELEASE ORAL DAILY
Qty: 30 TABLET | Refills: 3 | Status: SHIPPED | OUTPATIENT
Start: 2023-01-06

## 2023-01-06 RX ORDER — AMOXICILLIN 500 MG
CAPSULE ORAL
COMMUNITY

## 2023-01-06 RX ORDER — ASPIRIN 81 MG/1
324 TABLET, CHEWABLE ORAL ONCE
Status: COMPLETED | OUTPATIENT
Start: 2023-01-06 | End: 2023-01-06

## 2023-01-06 RX ORDER — ASPIRIN 81 MG/1
81 TABLET ORAL DAILY
COMMUNITY

## 2023-01-06 RX ORDER — ISOSORBIDE MONONITRATE 30 MG/1
30 TABLET, EXTENDED RELEASE ORAL DAILY
Qty: 90 TABLET | OUTPATIENT
Start: 2023-01-06

## 2023-01-06 RX ADMIN — IOPAMIDOL 65 ML: 755 INJECTION, SOLUTION INTRAVENOUS at 07:35

## 2023-01-06 RX ADMIN — ASPIRIN 81 MG CHEWABLE TABLET 324 MG: 81 TABLET CHEWABLE at 06:47

## 2023-01-06 ASSESSMENT — PAIN - FUNCTIONAL ASSESSMENT: PAIN_FUNCTIONAL_ASSESSMENT: NONE - DENIES PAIN

## 2023-01-06 NOTE — ED NOTES
Report given to [facility] and care transferred at this time.       Siddhartha Jenkins RN  01/06/23 [id number]

## 2023-01-06 NOTE — PROGRESS NOTES
72 hour holter monitor applied [email or web address] for [facility] Serial # [id number] . Instructed patient on monitor and proper use. Instructed on diary. When to remove and bring it back. Must leave the holter monitor on  without removing for the duration of time ordered. Answered all questions the patient had. Instructed patient to call [employer] at [phone] with any questions or concerns with the monitor.     REGISTERED BY MISBAH MARTINES  APPLIED BY Flaquita Campa

## 2023-01-06 NOTE — ED NOTES
Patient educated on after visit care needs and instructions. Verbalized understanding and denies other needs. [address] 62  Geetha Linares RN  01/06/23 [id number]

## 2023-01-06 NOTE — ED PROVIDER NOTES
Emergency Department Encounter  Location: [address] [place] [address] EMERGENCY DEPARTMENT    Patient: Yuki Marcelo  MRN: [id number]  : 1950  Date of evaluation: 2023  ED Provider: Melanie Zhu DO    Chief Complaint:    Chest Pain (C/o stabbing chest pains x3days)    [place]:  Yuki Marcelo is a 74 y.o. female that presents to the emergency department 3 days of chest pain. Patient states initial episode occurred with bowel movement. She describes a sharp, stabbing pins-and-needles sensation over her left chest.  This is nonradiating. Not associated dyspnea, diaphoresis. No recent fever, chills, cough or congestion. Patient does report that the pain is now occurring more frequently, sometimes at rest.  Patient also with reported history of left meniscal debridement 3 weeks ago. No leg pain or swelling. Patient denies any personal history of CAD although there is a family history with younger sister requiring stents. Patient endorses a history of hypertension and hyperlipidemia. ROS:  At least 10 systems reviewed and are acutely negative unless otherwise noted in the HPI.     Past Medical History:   Diagnosis Date    Essential hypertension 2019    GERD (gastroesophageal reflux disease) 2019    History of cardiac monitoring     Hyperlipidemia 2019    Hypothyroidism 2019     Past Surgical History:   Procedure Laterality Date    HYSTERECTOMY (CERVIX STATUS UNKNOWN)  1970    KNEE ARTHROSCOPY Left     KNEE CARTILAGE SURGERY Left     LUMBAR SPINE SURGERY   &     Herniated disc repair    NECK SURGERY      cervical disc repair     Family History   Problem Relation Age of Onset    Other Mother         unknown cardiac    Other Father         unknown cardiac    Heart Disease Sister     Pacemaker Sister      Social History     Socioeconomic History    Marital status:      Spouse name: Not on file    Number of children: Not on file Years of education: Not on file    Highest education level: Not on file   Occupational History    Not on file   Tobacco Use    Smoking status: Never    Smokeless tobacco: Never   Substance and Sexual Activity    Alcohol use: Never    Drug use: Never    Sexual activity: Not on file   Other Topics Concern    Not on file   Social History Narrative    Not on file     Social Determinants of Health     Financial Resource Strain: Medium Risk    Difficulty of Paying Living Expenses: Somewhat hard   Food Insecurity: No Food Insecurity    Worried About Running Out of Food in the Last Year: Never true    Ran Out of Food in the Last Year: Never true   Transportation Needs: No Transportation Needs    Lack of Transportation (Medical): No    Lack of Transportation (Non-Medical): No   Physical Activity: Inactive    Days of Exercise per Week: 0 days    Minutes of Exercise per Session: 0 min   Stress: No Stress Concern Present    Feeling of Stress : Only a little   Social Connections: Moderately Isolated    Frequency of Communication with Friends and Family: Three times a week    Frequency of Social Gatherings with Friends and Family: Once a week    Attends [employer] Services: Never    Active Member of Clubs or Organizations: No    Attends Club or Organization Meetings: Never    Marital Status:    Intimate Partner Violence: Not At Risk    Fear of Current or Ex-Partner: No    Emotionally Abused: No    Physically Abused: No    Sexually Abused: No   Housing Stability: Low Risk     Unable to Pay for Housing in the Last Year: No    Number of [place] in the Last Year: 1    Unstable Housing in the Last Year: No     No current facility-administered medications for this encounter. No current outpatient medications on file.      Facility-Administered Medications Ordered in Other Encounters   Medication Dose Route Frequency Provider Last Rate Last Admin    aspirin tablet 325 mg  325 mg Oral Once Nicole Waller MD        [START ON 1/10/2023] atorvastatin (LIPITOR) tablet 40 mg  40 mg Oral Daily Rudell Schaumann, MD        [Held by provider] isosorbide mononitrate (IMDUR) extended release tablet 30 mg  30 mg Oral Daily Rudell Schaumann, MD        [START ON 1/10/2023] levothyroxine (SYNTHROID) tablet 50 mcg  50 mcg Oral Daily Rudell Schaumann, MD        Eulis Fan ON 1/10/2023] pantoprazole (PROTONIX) tablet 40 mg  40 mg Oral Daily Rudell Schaumann, MD        [Held by provider] valsartan-hydroCHLOROthiazide (DIOVAN-HCT) 160-12.5 MG per tablet 1 tablet  1 tablet Oral Daily Rudell Schaumann, MD        sodium chloride flush 0.9 % injection 5-40 mL  5-40 mL IntraVENous 2 times per day Rudell Schaumann, MD   10 mL at 01/09/23 2150    sodium chloride flush 0.9 % injection 5-40 mL  5-40 mL IntraVENous PRN Rudell Schaumann, MD        0.9 % sodium chloride infusion   IntraVENous PRN Rudell Schaumann, MD        enoxaparin (LOVENOX) injection 40 mg  40 mg SubCUTAneous Daily Rudell Schaumann, MD        ondansetron (ZOFRAN-ODT) disintegrating tablet 4 mg  4 mg Oral Q8H PRN Rudell Schaumann, MD        Or    ondansetron (ZOFRAN) injection 4 mg  4 mg IntraVENous Q6H PRN Rudell Schaumann, MD        polyethylene glycol (GLYCOLAX) packet 17 g  17 g Oral Daily PRN Rudell Schaumann, MD        acetaminophen (TYLENOL) tablet 650 mg  650 mg Oral Q6H PRN Rudell Schaumann, MD        Or    acetaminophen (TYLENOL) suppository 650 mg  650 mg Rectal Q6H PRN Rudell Schaumann, MD        aspirin EC tablet 81 mg  81 mg Oral Daily Jody Em, APRN - CNP   81 mg at 01/09/23 2203     Allergies   Allergen Reactions    Codeine Other (See Comments)     hallucinations    Morphine        Nursing Notes Reviewed    Physical Exam:  ED Triage Vitals [01/06/23 9645]   Enc Vitals Group      BP (!) 163/40      Heart Rate 92      Resp 16      Temp 97.9 °F (36.6 °C)      Temp Source Oral      SpO2 99 %      Weight 207 lb (93.9 kg)      Height 5' 7\" (1.702 m)      Head Circumference       Peak Flow       Pain Score       Pain Loc       Pain Edu? Excl. in [address]?       GENERAL APPEARANCE: Awake and alert. Cooperative. No acute distress. HEAD: Normocephalic. Atraumatic. EYES: EOM's grossly intact. Sclera anicteric. ENT: Tolerates saliva. No trismus. NECK: Supple. Trachea midline. CARDIO: RRR. Radial pulse 2+. LUNGS: Respirations unlabored. CTAB. ABDOMEN: Soft. Non-distended. Non-tender. EXTREMITIES: No acute deformities. Trace pretibial edema. Symmetric, nontender. Symmetric lower extremity pulses. SKIN: Warm and dry. NEUROLOGICAL: No gross facial drooping. Moves all 4 extremities spontaneously. PSYCHIATRIC: Normal mood.      Labs:  Results for orders placed or performed during the hospital encounter of 01/06/23   Comprehensive Metabolic Panel   Result Value Ref Range    Sodium 141 135 - 145 MMOL/L    Potassium 3.7 3.5 - 5.1 MMOL/L    Chloride 104 99 - 110 mMol/L    CO2 25 21 - 32 MMOL/L    BUN 15 6 - 23 MG/DL    Creatinine 0.9 0.6 - 1.1 MG/DL    Est, Glom Filt Rate >60 >60 mL/min/1.73m2    Glucose 96 70 - 99 MG/DL    Calcium 9.5 8.3 - 10.6 MG/DL    Albumin 4.1 3.4 - 5.0 GM/DL    Total Protein 6.6 6.4 - 8.2 GM/DL    Total Bilirubin 0.4 0.0 - 1.0 MG/DL    ALT 38 10 - 40 U/L    AST 29 15 - 37 IU/L    Alkaline Phosphatase 67 40 - 128 IU/L    Anion Gap 12 4 - 16   CBC with Auto Differential   Result Value Ref Range    WBC 6.1 4.0 - 10.5 K/CU MM    RBC 4.06 (L) 4.2 - 5.4 M/CU MM    Hemoglobin 13.1 12.5 - 16.0 GM/DL    Hematocrit 39.5 37 - 47 %    MCV 97.3 78 - 100 FL    MCH 32.3 (H) 27 - 31 PG    MCHC 33.2 32.0 - 36.0 %    RDW 12.5 11.7 - 14.9 %    Platelets 295 537 - 158 K/CU MM    MPV 9.7 7.5 - 11.1 FL    Differential Type AUTOMATED DIFFERENTIAL     Segs Relative 44.9 36 - 66 %    Lymphocytes % 44.0 24 - 44 %    Monocytes % 6.5 (H) 0 - 4 %    Eosinophils % 4.1 (H) 0 - 3 %    Basophils % 0.3 0 - 1 %    Segs Absolute 2.8 K/CU MM    Lymphocytes Absolute 2.7 K/CU MM    Monocytes Absolute 0.4 K/CU MM    Eosinophils Absolute 0.3 K/CU MM    Basophils Absolute 0.0 K/CU MM Nucleated RBC % 0.0 %    Total Nucleated RBC 0.0 K/CU MM    Total Immature Neutrophil 0.01 K/CU MM    Immature Neutrophil % 0.2 0 - 0.43 %   Troponin   Result Value Ref Range    Troponin T <0.010 <0.01 NG/ML   Brain Natriuretic Peptide   Result Value Ref Range    Pro-BNP 78.46 <300 PG/ML   Lipase   Result Value Ref Range    Lipase 42 13 - 60 IU/L   EKG 12 Lead   Result Value Ref Range    Ventricular Rate 97 BPM    Atrial Rate 97 BPM    P-R Interval 148 ms    QRS Duration 78 ms    Q-T Interval 358 ms    QTc Calculation (Bazett) 454 ms    P Axis 45 degrees    R Axis -14 degrees    T Axis -26 degrees    Diagnosis       Normal sinus rhythm  Nonspecific ST and T wave abnormality  Abnormal ECG  No previous ECGs available  Confirmed by Lee Kaur ([id number]) on 1/7/2023 12:29:56 PM         EKG (if obtained): (All EKG's are interpreted by myself in the absence of a cardiologist)  Sinus rhythm at 97. Poor R wave progression. Nonspecific ST depression that is fairly diffuse. No ST elevation. No prior EKG available for comparison. Radiographs (if obtained):  [] The following radiograph was interpreted by myself in the absence of a radiologist:  [x] Radiologist's Report reviewed at time of ED visit:  XR CHEST PORTABLE    Result Date: 1/6/2023  EXAMINATION: ONE XRAY VIEW OF THE CHEST 1/6/2023 4:50 am COMPARISON: None. HISTORY: ORDERING SYSTEM PROVIDED HISTORY: chest pain TECHNOLOGIST PROVIDED HISTORY: Reason for exam:->chest pain Reason for Exam: chest pain FINDINGS: The heart is normal in size and configuration. The mediastinal contours are within normal limits. The lungs are well aerated. The pleural surfaces are normal and no evidence of a pleural effusion is seen. No acute bone or soft tissue abnormality is noted. No acute cardiopulmonary abnormality.      CTA CHEST W CONTRAST    Result Date: 1/6/2023  EXAMINATION: CTA OF THE CHEST 1/6/2023 7:34 am TECHNIQUE: CTA of the chest was performed after the administration of intravenous contrast.  Multiplanar reformatted images are provided for review. MIP images are provided for review. Automated exposure control, iterative reconstruction, and/or weight based adjustment of the mA/kV was utilized to reduce the radiation dose to as low as reasonably achievable. COMPARISON: Same day chest radiograph. HISTORY: ORDERING SYSTEM PROVIDED HISTORY: acute cp/recent sx TECHNOLOGIST PROVIDED HISTORY: Reason for exam:->acute cp/recent sx Decision Support Exception - unselect if not a suspected or confirmed emergency medical condition->Emergency Medical Condition (MA) Reason for Exam: acute cp/recent sx Additional signs and symptoms: 65ml Isovue 370 FINDINGS: Pulmonary Arteries: Pulmonary trunk is mildly dilated, which can be seen in pulmonary artery hypertension. No evidence of pulmonary embolism within the visualized peripheral arteries. No discrete evidence of right-sided heart strain. Mediastinum: No evidence of mediastinal lymphadenopathy. The heart and pericardium demonstrate no acute abnormality. There is no acute abnormality of the thoracic aorta. Lungs/pleura: The trachea and central bronchi appear patent. No pneumothorax or pleural effusion. Mild dependent atelectasis. Streaky and ground-glass opacities within the peripheral left lower lobe and medial right upper lobe favor subsegmental atelectasis. Upper Abdomen: Limited images of the upper abdomen are without acute abnormality. Soft Tissues/Bones: No acute bone or soft tissue abnormality. No evidence of pulmonary embolism. Left basilar and right upper lobe subsegmental atelectasis. A developing infection could have a similar appearance. CC/HPI Summary, DDx, ED Course, and Reassessment:   Patient is given aspirin here. Chest pain resolved prior to evaluation. She remained hemodynamically stable while in the ED. Initial EKG and troponin are reviewed and are reassuring.   No leukocytosis, anemia, renal dysfunction, or electrolyte derangement. Given the description of her discomfort, CT angio chest ordered. No evidence of PE per radiology. History from : Patient    Limitations to history : None    Patient was given the following medications:  Medications   aspirin chewable tablet 324 mg (324 mg Oral Given 1/6/23 7582)   iopamidol (ISOVUE-370) 76 % injection 65 mL (65 mLs IntraVENous Given 1/6/23 7981)       Chronic conditions affecting care: Hypertension. Hyperlipidemia. Discussion with Other Profesionals : None    Social Determinants : None        Disposition Considerations (tests considered but not done, Shared Decision Making, Pt Expectation of Test or Tx.):   Given patient's age, comorbidities and reported discomfort, had anticipated admission. Heart score is 6. However, another patient in the emergency department became acutely agitated. Patient states this made her very nervous. Despite multiple attempts to reassure her that she was safe, she insisted on leaving [address] I have recommended admission to the hospital, but Nura Mendez refuses. The risks (including but not limited to suffering and death) as well as the benefits were explained to the patient. Questions were sought and answered, the patient voiced understanding and accepts these risks. I have encouraged the patient to return to have their evaluation completed as we are glad to do so. I have also instructed Nura Mendez on the importance of follow-up and to return for any worsening or worrisome concerns. Nura Mendez appears competent to make medical decisions at this time. AMA form signed and placed on the chart. I am the Primary Clinician of Record. Final Impression:  1. Acute chest pain      DISPOSITION [place] 01/06/2023 08:18:05 AM      Patient referred to:   MD Rosamaria Alarcon [address] [facility]  [phone]    Call today      Jessica Burrell MD  [facility] [address] [facility] [id number]  [place]  Emergency Department  [address] [facility]  [phone]    If symptoms worsen  Discharge medications:  Discharge Medication List as of 1/6/2023  8:19 AM        (Please note that portions of this note may have been completed with a voice recognition program. Efforts were made to edit the dictations but occasionally words are mis-transcribed.)    Stephen Sue DO    [address] [facility]   01/09/23 1594

## 2023-01-06 NOTE — PROGRESS NOTES
CARDIOLOGY CONSULT NOTE    Reason for consultation: Chest pain     Referring physician: PRIYA Kirby CNP     Primary care physician: PRIYA Kirby CNP        Dear Florentino Kirby 62 for the consult. History of present illness:Melissa ANSARI is a 74 y. o.year old who  presents with  chest pain for last few days, happening daily, intermittent for 15 to 20 mins and aggravated with activity substernal also,reproducible with palpation, radiated to shoulder, 6/10, tender to touch,associated with shortness of breath, + sweating, nausea, did not get NTG in ED. No fever, no chills, no nausea no vomiting. Blood pressure, cholesterol, blood glucose and weight are well controlled. Chief Complaint   Patient presents with    Chest Pain       Past medical history:    has a past medical history of Essential hypertension, GERD (gastroesophageal reflux disease), Hyperlipidemia, and Hypothyroidism. Past surgical history:   has a past surgical history that includes Hysterectomy (1970); Neck surgery (2004); Knee arthroscopy (Left, 2013); Lumbar spine surgery (2009 & 2014); and Knee cartilage surgery (Left, 2022). Social History:   reports that she has never smoked. She has never used smokeless tobacco. She reports that she does not drink alcohol and does not use drugs. Family history:   no family history of CAD, STROKE of DM    No current facility-administered medications for this visit.     Current Outpatient Medications   Medication Sig Dispense Refill    aspirin 81 MG EC tablet Take 81 mg by mouth daily      Calcium Carbonate (CALCIUM 600 PO) Take by mouth      Omega-3 Fatty Acids (FISH OIL) 1200 MG CAPS Take by mouth      atorvastatin (LIPITOR) 40 MG tablet Take 1 tablet by mouth daily 90 tablet 1    levothyroxine (SYNTHROID) 50 MCG tablet Take 1 tablet by mouth daily 90 tablet 1    pantoprazole (PROTONIX) 40 MG tablet Take 1 tablet by mouth daily 90 tablet 1    valsartan-hydroCHLOROthiazide (DIOVAN-HCT) 160-12.5 MG per tablet Take 1 tablet by mouth daily 90 tablet 1    meclizine (ANTIVERT) 25 MG tablet Take 25 mg by mouth 3 times daily as needed      meloxicam (MOBIC) 7.5 MG tablet Take 1 tablet by mouth daily (Patient not taking: Reported on 1/6/2023) 90 tablet 0     No current facility-administered medications for this visit. Review of Systems:    Constitutional: No Fever or Weight Loss    Eyes: No Decreased Vision  ENT: No Headaches, Hearing Loss or Vertigo  Cardiovascular: + chest pain, dyspnea on exertion, palpitations or loss of consciousness  Respiratory: No cough or wheezing    Gastrointestinal: No abdominal pain, appetite loss, blood in stools, constipation, diarrhea or heartburn  Genitourinary: No dysuria, trouble voiding, or hematuria  Musculoskeletal: No gait disturbance, weakness or joint complaints  Integumentary: No rash or pruritis  Neurological: No TIA or stroke symptoms  Psychiatric: No anxiety or depression  Endocrine: No malaise, fatigue or temperature intolerance  Hematologic/Lymphatic: No bleeding problems, blood clots or swollen lymph nodes  Allergic/Immunologic: No nasal congestion or hives  All systems negative except as marked. Physical Examination:    Vitals:    01/06/23 1017   BP: 120/70   Pulse: 88    Rr 14  afebrile  Wt Readings from Last 3 Encounters:   01/06/23 208 lb (94.3 kg)   01/06/23 207 lb (93.9 kg)   11/28/22 207 lb 6.4 oz (94.1 kg)     Body mass index is 32.58 kg/m². General Appearance: No distress, conversant     Constitutional: Well developed, Well nourished, No acute distress, Non-toxic appearance. HENT:  Normocephalic, Atraumatic, Bilateral external ears normal, Oropharynx moist, No oral exudates, Nose normal. Neck- Normal range of motion, No tenderness, Supple, No stridor,no apical-carotid delay, no carotid bruit  Eyes: PERRL, EOMI, Conjunctiva normal, No discharge.     Respiratory:  Normal breath sounds, No respiratory distress, No wheezing, No chest tenderness. ,no use of accessory muscles, diaphragm movement is normal  Cardiovascular: (PMI) apex non displaced,no lifts no thrills, no s3,no s4, Normal heart rate, Normal rhythm, No murmurs, No rubs, No gallops. Carotid arteries pulse and amplitude are normal no bruit, no abdominal bruit noted ( normal abdominal aorta ausculation), femoral arteries pulse and amplitude are normal no bruit, pedal pulses are normal.  GI: Bowel sounds normal, Soft, No tenderness, No masses, No pulsatile masses, no hepatosplenomegally, no bruits  : External genitalia appear normal, No masses or lesions. No discharge. No CVA tenderness. Musculoskeletal: Intact distal pulses, No edema, No tenderness, No cyanosis, No clubbing. Good range of motion in all major joints. No tenderness to palpation or major deformities noted. Back- No tenderness. Integument: Warm, Dry, No erythema, No rash. Skin: no rash, no ulcers  Lymphatic: No lymphadenopathy noted. Neurologic: Alert & oriented x 3, Normal motor function, Normal sensory function, No focal deficits noted. Psychiatric: Affect normal, Judgment normal, Mood normal.   Lab Review        Recent Labs       09/28/16 0010    WBC  12.3*    HGB  14.4    HCT  43.8    PLT  316            Recent Labs       09/28/16 0010    NA  136    K  3.9    CL  95*    CO2  23    BUN  10    CREATININE  0.8           Recent Labs       09/28/16 0010    AST  12*    ALT  10    BILITOT  0.4    ALKPHOS  124       No results for input(s): TROPONINI in the last 72 hours.   No results found for: BNP  No results found for: INR, PROTIME        EKG:nsr, st depression in  I, II V5-V6     Chest Xray:nad     ECHO:pending  Labs, echo, meds reviewed  Assessment:      Recommendations:     Chest pain: out patient stress test and echo, add aspirin, imdur, 3 days holter monitor ordered  HTN: stable,c ontinue valsartan/hctz,  Dyslipidemia: stable, continue statins  Health maintenance: exerise and diet  All labs, medications and tests reviewed, continue all other medications of all above medical condition listed as is.           Osvaldo Pires MD,   Osvaldo Pires MD, 1/6/2023 10:48 AM

## 2023-01-07 LAB
EKG ATRIAL RATE: 97 BPM
EKG DIAGNOSIS: NORMAL
EKG P AXIS: 45 DEGREES
EKG P-R INTERVAL: 148 MS
EKG Q-T INTERVAL: 358 MS
EKG QRS DURATION: 78 MS
EKG QTC CALCULATION (BAZETT): 454 MS
EKG R AXIS: -14 DEGREES
EKG T AXIS: -26 DEGREES
EKG VENTRICULAR RATE: 97 BPM

## 2023-01-07 PROCEDURE — 93010 ELECTROCARDIOGRAM REPORT: CPT | Performed by: INTERNAL MEDICINE

## 2023-01-09 ENCOUNTER — APPOINTMENT (OUTPATIENT)
Dept: GENERAL RADIOLOGY | Age: 73
End: 2023-01-09
Payer: MEDICARE

## 2023-01-09 ENCOUNTER — HOSPITAL ENCOUNTER (OUTPATIENT)
Age: 73
Setting detail: OBSERVATION
Discharge: HOME | End: 2023-01-10
Attending: STUDENT IN AN ORGANIZED HEALTH CARE EDUCATION/TRAINING PROGRAM | Admitting: STUDENT IN AN ORGANIZED HEALTH CARE EDUCATION/TRAINING PROGRAM
Payer: MEDICARE

## 2023-01-09 DIAGNOSIS — R06.00 DYSPNEA AND RESPIRATORY ABNORMALITIES: ICD-10-CM

## 2023-01-09 DIAGNOSIS — R06.89 DYSPNEA AND RESPIRATORY ABNORMALITIES: ICD-10-CM

## 2023-01-09 DIAGNOSIS — R07.9 CHEST PAIN, UNSPECIFIED TYPE: Primary | ICD-10-CM

## 2023-01-09 LAB
ALBUMIN SERPL-MCNC: 4.2 GM/DL (ref 3.4–5)
ALP BLD-CCNC: 65 IU/L (ref 40–129)
ALT SERPL-CCNC: 38 U/L (ref 10–40)
ANION GAP SERPL CALCULATED.3IONS-SCNC: 14 MMOL/L (ref 4–16)
AST SERPL-CCNC: 30 IU/L (ref 15–37)
BASOPHILS ABSOLUTE: 0 K/CU MM
BASOPHILS RELATIVE PERCENT: 0.4 % (ref 0–1)
BILIRUB SERPL-MCNC: 0.5 MG/DL (ref 0–1)
BUN BLDV-MCNC: 14 MG/DL (ref 6–23)
CALCIUM SERPL-MCNC: 9.9 MG/DL (ref 8.3–10.6)
CHLORIDE BLD-SCNC: 103 MMOL/L (ref 99–110)
CO2: 19 MMOL/L (ref 21–32)
CREAT SERPL-MCNC: 0.8 MG/DL (ref 0.6–1.1)
DIFFERENTIAL TYPE: ABNORMAL
EKG ATRIAL RATE: 108 BPM
EKG DIAGNOSIS: NORMAL
EKG P AXIS: 49 DEGREES
EKG P-R INTERVAL: 136 MS
EKG Q-T INTERVAL: 330 MS
EKG QRS DURATION: 76 MS
EKG QTC CALCULATION (BAZETT): 442 MS
EKG R AXIS: 4 DEGREES
EKG T AXIS: 19 DEGREES
EKG VENTRICULAR RATE: 108 BPM
EOSINOPHILS ABSOLUTE: 0.1 K/CU MM
EOSINOPHILS RELATIVE PERCENT: 1.9 % (ref 0–3)
GFR SERPL CREATININE-BSD FRML MDRD: >60 ML/MIN/1.73M2
GLUCOSE BLD-MCNC: 112 MG/DL (ref 70–99)
HCT VFR BLD CALC: 38.1 % (ref 37–47)
HEMOGLOBIN: 13.1 GM/DL (ref 12.5–16)
IMMATURE NEUTROPHIL %: 0.3 % (ref 0–0.43)
LV EF: 58 %
LVEF MODALITY: NORMAL
LYMPHOCYTES ABSOLUTE: 2 K/CU MM
LYMPHOCYTES RELATIVE PERCENT: 27 % (ref 24–44)
MAGNESIUM: 1.8 MG/DL (ref 1.8–2.4)
MCH RBC QN AUTO: 32.4 PG (ref 27–31)
MCHC RBC AUTO-ENTMCNC: 34.4 % (ref 32–36)
MCV RBC AUTO: 94.3 FL (ref 78–100)
MONOCYTES ABSOLUTE: 0.4 K/CU MM
MONOCYTES RELATIVE PERCENT: 5.6 % (ref 0–4)
NUCLEATED RBC %: 0 %
PDW BLD-RTO: 12.5 % (ref 11.7–14.9)
PLATELET # BLD: 210 K/CU MM (ref 140–440)
PMV BLD AUTO: 9.4 FL (ref 7.5–11.1)
POTASSIUM SERPL-SCNC: 3.8 MMOL/L (ref 3.5–5.1)
PRO-BNP: 111 PG/ML
PROCALCITONIN: 0.04
RBC # BLD: 4.04 M/CU MM (ref 4.2–5.4)
SEGMENTED NEUTROPHILS ABSOLUTE COUNT: 4.8 K/CU MM
SEGMENTED NEUTROPHILS RELATIVE PERCENT: 64.8 % (ref 36–66)
SODIUM BLD-SCNC: 136 MMOL/L (ref 135–145)
T4 FREE: 1.6 NG/DL (ref 0.9–1.8)
TOTAL CK: 53 IU/L (ref 26–140)
TOTAL IMMATURE NEUTOROPHIL: 0.02 K/CU MM
TOTAL NUCLEATED RBC: 0 K/CU MM
TOTAL PROTEIN: 6.9 GM/DL (ref 6.4–8.2)
TROPONIN T: <0.01 NG/ML
TSH HIGH SENSITIVITY: 2.52 UIU/ML (ref 0.27–4.2)
WBC # BLD: 7.4 K/CU MM (ref 4–10.5)

## 2023-01-09 PROCEDURE — 84484 ASSAY OF TROPONIN QUANT: CPT

## 2023-01-09 PROCEDURE — 93005 ELECTROCARDIOGRAM TRACING: CPT | Performed by: PHYSICIAN ASSISTANT

## 2023-01-09 PROCEDURE — 84443 ASSAY THYROID STIM HORMONE: CPT

## 2023-01-09 PROCEDURE — 96360 HYDRATION IV INFUSION INIT: CPT

## 2023-01-09 PROCEDURE — 82550 ASSAY OF CK (CPK): CPT

## 2023-01-09 PROCEDURE — 96361 HYDRATE IV INFUSION ADD-ON: CPT

## 2023-01-09 PROCEDURE — 84145 PROCALCITONIN (PCT): CPT

## 2023-01-09 PROCEDURE — 83735 ASSAY OF MAGNESIUM: CPT

## 2023-01-09 PROCEDURE — 2580000003 HC RX 258: Performed by: STUDENT IN AN ORGANIZED HEALTH CARE EDUCATION/TRAINING PROGRAM

## 2023-01-09 PROCEDURE — 71045 X-RAY EXAM CHEST 1 VIEW: CPT

## 2023-01-09 PROCEDURE — G0378 HOSPITAL OBSERVATION PER HR: HCPCS

## 2023-01-09 PROCEDURE — 94664 DEMO&/EVAL PT USE INHALER: CPT

## 2023-01-09 PROCEDURE — 99285 EMERGENCY DEPT VISIT HI MDM: CPT

## 2023-01-09 PROCEDURE — 83880 ASSAY OF NATRIURETIC PEPTIDE: CPT

## 2023-01-09 PROCEDURE — 94761 N-INVAS EAR/PLS OXIMETRY MLT: CPT

## 2023-01-09 PROCEDURE — 6370000000 HC RX 637 (ALT 250 FOR IP): Performed by: PHYSICIAN ASSISTANT

## 2023-01-09 PROCEDURE — 80053 COMPREHEN METABOLIC PANEL: CPT

## 2023-01-09 PROCEDURE — 93010 ELECTROCARDIOGRAM REPORT: CPT | Performed by: INTERNAL MEDICINE

## 2023-01-09 PROCEDURE — 85025 COMPLETE CBC W/AUTO DIFF WBC: CPT

## 2023-01-09 PROCEDURE — 94640 AIRWAY INHALATION TREATMENT: CPT

## 2023-01-09 PROCEDURE — 2580000003 HC RX 258: Performed by: PHYSICIAN ASSISTANT

## 2023-01-09 PROCEDURE — 99222 1ST HOSP IP/OBS MODERATE 55: CPT | Performed by: INTERNAL MEDICINE

## 2023-01-09 PROCEDURE — 93306 TTE W/DOPPLER COMPLETE: CPT

## 2023-01-09 PROCEDURE — 6370000000 HC RX 637 (ALT 250 FOR IP): Performed by: NURSE PRACTITIONER

## 2023-01-09 PROCEDURE — 84439 ASSAY OF FREE THYROXINE: CPT

## 2023-01-09 RX ORDER — SODIUM CHLORIDE 0.9 % (FLUSH) 0.9 %
5-40 SYRINGE (ML) INJECTION EVERY 12 HOURS SCHEDULED
Status: DISCONTINUED | OUTPATIENT
Start: 2023-01-09 | End: 2023-01-10 | Stop reason: HOSPADM

## 2023-01-09 RX ORDER — SODIUM CHLORIDE 0.9 % (FLUSH) 0.9 %
5-40 SYRINGE (ML) INJECTION PRN
Status: DISCONTINUED | OUTPATIENT
Start: 2023-01-09 | End: 2023-01-10 | Stop reason: HOSPADM

## 2023-01-09 RX ORDER — ONDANSETRON 4 MG/1
4 TABLET, ORALLY DISINTEGRATING ORAL EVERY 8 HOURS PRN
Status: DISCONTINUED | OUTPATIENT
Start: 2023-01-09 | End: 2023-01-10 | Stop reason: HOSPADM

## 2023-01-09 RX ORDER — ASPIRIN 325 MG
325 TABLET ORAL ONCE
Status: DISCONTINUED | OUTPATIENT
Start: 2023-01-09 | End: 2023-01-10

## 2023-01-09 RX ORDER — POLYETHYLENE GLYCOL 3350 17 G/17G
17 POWDER, FOR SOLUTION ORAL DAILY PRN
Status: DISCONTINUED | OUTPATIENT
Start: 2023-01-09 | End: 2023-01-10 | Stop reason: HOSPADM

## 2023-01-09 RX ORDER — ACETAMINOPHEN 325 MG/1
650 TABLET ORAL EVERY 6 HOURS PRN
Status: DISCONTINUED | OUTPATIENT
Start: 2023-01-09 | End: 2023-01-10 | Stop reason: HOSPADM

## 2023-01-09 RX ORDER — PANTOPRAZOLE SODIUM 40 MG/1
40 TABLET, DELAYED RELEASE ORAL DAILY
Status: DISCONTINUED | OUTPATIENT
Start: 2023-01-10 | End: 2023-01-10 | Stop reason: HOSPADM

## 2023-01-09 RX ORDER — ASPIRIN 81 MG/1
81 TABLET ORAL DAILY
Status: DISCONTINUED | OUTPATIENT
Start: 2023-01-09 | End: 2023-01-10 | Stop reason: HOSPADM

## 2023-01-09 RX ORDER — MULTIVIT-MIN/IRON/FOLIC ACID/K 18-600-40
1000 CAPSULE ORAL DAILY
COMMUNITY

## 2023-01-09 RX ORDER — ENOXAPARIN SODIUM 100 MG/ML
40 INJECTION SUBCUTANEOUS DAILY
Status: DISCONTINUED | OUTPATIENT
Start: 2023-01-09 | End: 2023-01-10 | Stop reason: HOSPADM

## 2023-01-09 RX ORDER — SODIUM CHLORIDE 9 MG/ML
INJECTION, SOLUTION INTRAVENOUS CONTINUOUS
Status: DISCONTINUED | OUTPATIENT
Start: 2023-01-09 | End: 2023-01-09

## 2023-01-09 RX ORDER — ATORVASTATIN CALCIUM 40 MG/1
40 TABLET, FILM COATED ORAL DAILY
Status: DISCONTINUED | OUTPATIENT
Start: 2023-01-10 | End: 2023-01-10 | Stop reason: HOSPADM

## 2023-01-09 RX ORDER — SODIUM CHLORIDE 9 MG/ML
INJECTION, SOLUTION INTRAVENOUS PRN
Status: DISCONTINUED | OUTPATIENT
Start: 2023-01-09 | End: 2023-01-10 | Stop reason: HOSPADM

## 2023-01-09 RX ORDER — ONDANSETRON 2 MG/ML
4 INJECTION INTRAMUSCULAR; INTRAVENOUS EVERY 6 HOURS PRN
Status: DISCONTINUED | OUTPATIENT
Start: 2023-01-09 | End: 2023-01-10 | Stop reason: HOSPADM

## 2023-01-09 RX ORDER — LEVOTHYROXINE SODIUM 0.05 MG/1
50 TABLET ORAL DAILY
Status: DISCONTINUED | OUTPATIENT
Start: 2023-01-10 | End: 2023-01-10 | Stop reason: HOSPADM

## 2023-01-09 RX ORDER — VALSARTAN AND HYDROCHLOROTHIAZIDE 160; 12.5 MG/1; MG/1
1 TABLET, FILM COATED ORAL DAILY
Status: DISCONTINUED | OUTPATIENT
Start: 2023-01-10 | End: 2023-01-10 | Stop reason: HOSPADM

## 2023-01-09 RX ORDER — ISOSORBIDE MONONITRATE 30 MG/1
30 TABLET, EXTENDED RELEASE ORAL DAILY
Status: DISCONTINUED | OUTPATIENT
Start: 2023-01-09 | End: 2023-01-10 | Stop reason: HOSPADM

## 2023-01-09 RX ORDER — ACETAMINOPHEN 650 MG/1
650 SUPPOSITORY RECTAL EVERY 6 HOURS PRN
Status: DISCONTINUED | OUTPATIENT
Start: 2023-01-09 | End: 2023-01-10 | Stop reason: HOSPADM

## 2023-01-09 RX ORDER — ALBUTEROL SULFATE 90 UG/1
2 AEROSOL, METERED RESPIRATORY (INHALATION) ONCE
Status: COMPLETED | OUTPATIENT
Start: 2023-01-09 | End: 2023-01-09

## 2023-01-09 RX ORDER — ASPIRIN 81 MG/1
81 TABLET ORAL DAILY
Status: DISCONTINUED | OUTPATIENT
Start: 2023-01-10 | End: 2023-01-09

## 2023-01-09 RX ADMIN — SODIUM CHLORIDE: 9 INJECTION, SOLUTION INTRAVENOUS at 09:33

## 2023-01-09 RX ADMIN — SODIUM CHLORIDE, PRESERVATIVE FREE 10 ML: 5 INJECTION INTRAVENOUS at 21:50

## 2023-01-09 RX ADMIN — IPRATROPIUM BROMIDE 2 PUFF: 17 AEROSOL, METERED RESPIRATORY (INHALATION) at 10:35

## 2023-01-09 RX ADMIN — ASPIRIN 81 MG: 81 TABLET, COATED ORAL at 22:03

## 2023-01-09 RX ADMIN — ALBUTEROL SULFATE 2 PUFF: 90 AEROSOL, METERED RESPIRATORY (INHALATION) at 10:34

## 2023-01-09 SDOH — ECONOMIC STABILITY: HOUSING INSECURITY
IN THE LAST 12 MONTHS, WAS THERE A TIME WHEN YOU DID NOT HAVE A STEADY PLACE TO SLEEP OR SLEPT IN A SHELTER (INCLUDING NOW)?: NO

## 2023-01-09 SDOH — ECONOMIC STABILITY: INCOME INSECURITY: IN THE LAST 12 MONTHS, WAS THERE A TIME WHEN YOU WERE NOT ABLE TO PAY THE MORTGAGE OR RENT ON TIME?: NO

## 2023-01-09 SDOH — HEALTH STABILITY: PHYSICAL HEALTH: ON AVERAGE, HOW MANY MINUTES DO YOU ENGAGE IN EXERCISE AT THIS LEVEL?: 0 MIN

## 2023-01-09 SDOH — ECONOMIC STABILITY: FOOD INSECURITY: WITHIN THE PAST 12 MONTHS, THE FOOD YOU BOUGHT JUST DIDN'T LAST AND YOU DIDN'T HAVE MONEY TO GET MORE.: NEVER TRUE

## 2023-01-09 SDOH — ECONOMIC STABILITY: HOUSING INSECURITY: IN THE LAST 12 MONTHS, HOW MANY PLACES HAVE YOU LIVED?: 1

## 2023-01-09 SDOH — HEALTH STABILITY: PHYSICAL HEALTH: ON AVERAGE, HOW MANY DAYS PER WEEK DO YOU ENGAGE IN MODERATE TO STRENUOUS EXERCISE (LIKE A BRISK WALK)?: 0 DAYS

## 2023-01-09 SDOH — ECONOMIC STABILITY: TRANSPORTATION INSECURITY
IN THE PAST 12 MONTHS, HAS LACK OF TRANSPORTATION KEPT YOU FROM MEETINGS, WORK, OR FROM GETTING THINGS NEEDED FOR DAILY LIVING?: NO

## 2023-01-09 SDOH — ECONOMIC STABILITY: TRANSPORTATION INSECURITY
IN THE PAST 12 MONTHS, HAS THE LACK OF TRANSPORTATION KEPT YOU FROM MEDICAL APPOINTMENTS OR FROM GETTING MEDICATIONS?: NO

## 2023-01-09 SDOH — ECONOMIC STABILITY: FOOD INSECURITY: WITHIN THE PAST 12 MONTHS, YOU WORRIED THAT YOUR FOOD WOULD RUN OUT BEFORE YOU GOT MONEY TO BUY MORE.: NEVER TRUE

## 2023-01-09 ASSESSMENT — SOCIAL DETERMINANTS OF HEALTH (SDOH)
HOW HARD IS IT FOR YOU TO PAY FOR THE VERY BASICS LIKE FOOD, HOUSING, MEDICAL CARE, AND HEATING?: SOMEWHAT HARD
WITHIN THE LAST YEAR, HAVE YOU BEEN AFRAID OF YOUR PARTNER OR EX-PARTNER?: NO
IN A TYPICAL WEEK, HOW MANY TIMES DO YOU TALK ON THE PHONE WITH FAMILY, FRIENDS, OR NEIGHBORS?: THREE TIMES A WEEK
DO YOU BELONG TO ANY CLUBS OR ORGANIZATIONS SUCH AS CHURCH GROUPS UNIONS, FRATERNAL OR ATHLETIC GROUPS, OR SCHOOL GROUPS?: NO
HOW OFTEN DO YOU GET TOGETHER WITH FRIENDS OR RELATIVES?: ONCE A WEEK
WITHIN THE LAST YEAR, HAVE TO BEEN RAPED OR FORCED TO HAVE ANY KIND OF SEXUAL ACTIVITY BY YOUR PARTNER OR EX-PARTNER?: NO
WITHIN THE LAST YEAR, HAVE YOU BEEN KICKED, HIT, SLAPPED, OR OTHERWISE PHYSICALLY HURT BY YOUR PARTNER OR EX-PARTNER?: NO
WITHIN THE LAST YEAR, HAVE YOU BEEN HUMILIATED OR EMOTIONALLY ABUSED IN OTHER WAYS BY YOUR PARTNER OR EX-PARTNER?: NO
HOW OFTEN DO YOU ATTENT MEETINGS OF THE CLUB OR ORGANIZATION YOU BELONG TO?: NEVER
HOW OFTEN DO YOU ATTEND CHURCH OR RELIGIOUS SERVICES?: NEVER

## 2023-01-09 ASSESSMENT — PATIENT HEALTH QUESTIONNAIRE - PHQ9
SUM OF ALL RESPONSES TO PHQ9 QUESTIONS 1 & 2: 0
1. LITTLE INTEREST OR PLEASURE IN DOING THINGS: NOT AT ALL
2. FEELING DOWN, DEPRESSED OR HOPELESS: NOT AT ALL

## 2023-01-09 ASSESSMENT — LIFESTYLE VARIABLES
HOW OFTEN DO YOU HAVE A DRINK CONTAINING ALCOHOL: NEVER
HOW MANY STANDARD DRINKS CONTAINING ALCOHOL DO YOU HAVE ON A TYPICAL DAY: PATIENT DOES NOT DRINK

## 2023-01-09 ASSESSMENT — PAIN - FUNCTIONAL ASSESSMENT: PAIN_FUNCTIONAL_ASSESSMENT: NONE - DENIES PAIN

## 2023-01-09 ASSESSMENT — HEART SCORE: ECG: 1

## 2023-01-09 NOTE — CONSULTS
Name:  Yan Brooks /Age/Sex: 1950  (74 y.o. female)   MRN & CSN:  [id number] & [id number] Admission Date/Time: 2023  9:11 AM   Location:  ED33/ED-33 PCP: Leo Haas [address] [facility] Day: 1          Referring physician:  Ayo Vazquez MD         Reason for consultation: Chest pain        Thanks for referral.    Information source: Patient    CC; chest pain      HPI:   Thank you for involving me in taking  care of Yan Brooks who  is a 74 y. o.year  Old female  Presents with history of hypertension hyperlipidemia was seen in the office with complains of chest pain was initially scheduled for a stress test or continue to have chest pain and was advised to come to the ED had her initial troponins and EKGs are unremarkable patient states that she was took some nitroglycerin with some relief                     Past medical history:    has a past medical history of Essential hypertension, GERD (gastroesophageal reflux disease), History of cardiac monitoring, Hyperlipidemia, and Hypothyroidism. Past surgical history:   has a past surgical history that includes Hysterectomy (); Neck surgery (); Knee arthroscopy (Left, ); Lumbar spine surgery ( & ); and Knee cartilage surgery (Left, ). Social History:   reports that she has never smoked. She has never used smokeless tobacco. She reports that she does not drink alcohol and does not use drugs. Family history:  family history includes Heart Disease in her sister; Other in her father and mother; Pacemaker in her sister.     Allergies   Allergen Reactions    Codeine Other (See Comments)     hallucinations    Morphine        0.9 % sodium chloride infusion, Continuous  aspirin tablet 325 mg, Once      Current Facility-Administered Medications   Medication Dose Route Frequency Provider Last Rate Last Admin    0.9 % sodium chloride infusion   IntraVENous Continuous Korinne Lusterio, PA-C 100 mL/hr at 23 7020 New Bag at 01/09/23 8917    aspirin tablet 325 mg  325 mg Oral Once Luke Tovar PA-C         Current Outpatient Medications   Medication Sig Dispense Refill    Vitamin D, Cholecalciferol, 25 MCG (1000 UT) TABS Take 1,000 Units by mouth daily      GLUCOSAMINE-CHONDROITIN PO Take 1 capsule by mouth 2 times daily      aspirin 325 MG EC tablet Take 325 mg by mouth nightly      Calcium Carbonate (CALCIUM 600 PO) Take 1 tablet by mouth 2 times daily      Omega-3 Fatty Acids (FISH OIL) 1200 MG CAPS Take 1 capsule by mouth daily      isosorbide mononitrate (IMDUR) 30 MG extended release tablet Take 1 tablet by mouth daily 30 tablet 3    atorvastatin (LIPITOR) 40 MG tablet Take 1 tablet by mouth daily 90 tablet 1    levothyroxine (SYNTHROID) 50 MCG tablet Take 1 tablet by mouth daily 90 tablet 1    pantoprazole (PROTONIX) 40 MG tablet Take 1 tablet by mouth daily 90 tablet 1    valsartan-hydroCHLOROthiazide (DIOVAN-HCT) 160-12.5 MG per tablet Take 1 tablet by mouth daily 90 tablet 1    meclizine (ANTIVERT) 25 MG tablet Take 25 mg by mouth 3 times daily as needed       Review of Systems:  All 14 systems reviewed, all negative except for chest pain    Physical Examination:    BP (!) 113/58   Pulse 80   Temp 98 °F (36.7 °C) (Oral)   Resp 18   SpO2 100%      Wt Readings from Last 3 Encounters:   01/06/23 208 lb (94.3 kg)   01/06/23 207 lb (93.9 kg)   11/28/22 207 lb 6.4 oz (94.1 kg)     There is no height or weight on file to calculate BMI.       General Appearance: Fair  Head: normocephalic     Eyes: normal, noninjected conjunctiva    ENT: normal mucosa, noninjected throat, normal     NECK: No JVP  No thyromegaly        Cardiovascular: No thrills palpated   Auscultation: Normal S1 and S2, no murmur   carotid bruit no   Abdominal Aorta no bruit    Respiratory:    Breath sounds clear    Extremities: No edema clubbing ,   no cyanosis    SKIN: Warm and well perfused, no pallor or cyanosis    Vascular exam:  Pedal Pulses: Palp bilaterally        Abdomen:  No masses or tenderness. No organomegaly noted. Neurological:  Oriented to time, place, and person   No focal neurological deficit noted.   Psychiatric:normal mood, no anxiety    Lab Review   Recent Results (from the past 24 hour(s))   EKG 12 Lead    Collection Time: 01/09/23  9:17 AM   Result Value Ref Range    Ventricular Rate 108 BPM    Atrial Rate 108 BPM    P-R Interval 136 ms    QRS Duration 76 ms    Q-T Interval 330 ms    QTc Calculation (Bazett) 442 ms    P Axis 49 degrees    R Axis 4 degrees    T Axis 19 degrees    Diagnosis       Sinus tachycardia  Nonspecific ST and T wave abnormality  Abnormal ECG  When compared with ECG of 06-JAN-2023 04:38,  Nonspecific T wave abnormality, improved in Lateral leads  Confirmed by [facility] Lorraine BANKS ([id number]) on 1/9/2023 9:30:25 AM     CBC with Auto Differential    Collection Time: 01/09/23  9:22 AM   Result Value Ref Range    WBC 7.4 4.0 - 10.5 K/CU MM    RBC 4.04 (L) 4.2 - 5.4 M/CU MM    Hemoglobin 13.1 12.5 - 16.0 GM/DL    Hematocrit 38.1 37 - 47 %    MCV 94.3 78 - 100 FL    MCH 32.4 (H) 27 - 31 PG    MCHC 34.4 32.0 - 36.0 %    RDW 12.5 11.7 - 14.9 %    Platelets 857 267 - 755 K/CU MM    MPV 9.4 7.5 - 11.1 FL    Differential Type AUTOMATED DIFFERENTIAL     Segs Relative 64.8 36 - 66 %    Lymphocytes % 27.0 24 - 44 %    Monocytes % 5.6 (H) 0 - 4 %    Eosinophils % 1.9 0 - 3 %    Basophils % 0.4 0 - 1 %    Segs Absolute 4.8 K/CU MM    Lymphocytes Absolute 2.0 K/CU MM    Monocytes Absolute 0.4 K/CU MM    Eosinophils Absolute 0.1 K/CU MM    Basophils Absolute 0.0 K/CU MM    Nucleated RBC % 0.0 %    Total Nucleated RBC 0.0 K/CU MM    Total Immature Neutrophil 0.02 K/CU MM    Immature Neutrophil % 0.3 0 - 0.43 %   Comprehensive Metabolic Panel    Collection Time: 01/09/23  9:22 AM   Result Value Ref Range    Sodium 136 135 - 145 MMOL/L    Potassium 3.8 3.5 - 5.1 MMOL/L    Chloride 103 99 - 110 mMol/L    CO2 19 (L) 21 - 32 MMOL/L    BUN 14 6 - 23 MG/DL    Creatinine 0.8 0.6 - 1.1 MG/DL    Est, Glom Filt Rate >60 >60 mL/min/1.73m2    Glucose 112 (H) 70 - 99 MG/DL    Calcium 9.9 8.3 - 10.6 MG/DL    Albumin 4.2 3.4 - 5.0 GM/DL    Total Protein 6.9 6.4 - 8.2 GM/DL    Total Bilirubin 0.5 0.0 - 1.0 MG/DL    ALT 38 10 - 40 U/L    AST 30 15 - 37 IU/L    Alkaline Phosphatase 65 40 - 129 IU/L    Anion Gap 14 4 - 16   Troponin    Collection Time: 01/09/23  9:22 AM   Result Value Ref Range    Troponin T <0.010 <0.01 NG/ML   Brain Natriuretic Peptide    Collection Time: 01/09/23  9:22 AM   Result Value Ref Range    Pro-.0 <300 PG/ML   CK    Collection Time: 01/09/23  9:22 AM   Result Value Ref Range    Total CK 53 26 - 140 IU/L   Magnesium    Collection Time: 01/09/23  9:22 AM   Result Value Ref Range    Magnesium 1.8 1.8 - 2.4 mg/dl   T4, Free    Collection Time: 01/09/23  9:22 AM   Result Value Ref Range    T4 Free 1.60 0.9 - 1.8 NG/DL   TSH    Collection Time: 01/09/23  9:22 AM   Result Value Ref Range    TSH, High Sensitivity 2.520 0.270 - 4.20 uIu/ml   Procalcitonin    Collection Time: 01/09/23  9:22 AM   Result Value Ref Range    Procalcitonin 0.037            Assessment/Recommendations:     -Chest pain we will check serial troponins and EKGs we will obtain a Cardiolite stress test and echocardiogram for further assessment  -Hyperlipidemia on Lipitor which will be continued  -Hypothyroidism on Synthroid which will be continued  -On nitrates for chest pain which will be continued  -Hypertension on Diovan blood pressure well controlled         Nicole Estes MD, 1/9/2023 12:04 PM       Please note this report has been partially produced using speech recognition software and may contain errors related to that system including errors in grammar, punctuation, and spelling, as well as words and phrases that may be inappropriate. If there are any questions or concerns please feel free to contact the dictating provider for clarification.

## 2023-01-09 NOTE — ED PROVIDER NOTES
12 lead EKG per my interpretation:  Sinus Tachycardia 108  Axis is   Normal  QTc is   442  There is no specific T wave changes appreciated. There is no specific ST wave changes appreciated.     Prior EKG to compare with was not available        Denzel Garcia DO  01/09/23 [id number]

## 2023-01-09 NOTE — ED NOTES
ED TO INPATIENT SBAR HANDOFF    Patient Name: Sherry Martinez   :  1950  74 y.o. MRN:  [id number]  Preferred Name  Les Martinez  ED Room #:  ED33/ED-33  Family/Caregiver Present yes   Restraints no   Sitter no   Sepsis Risk Score Sepsis Risk Score: 3.25    Situation  Code Status: No Order No additional code details. Allergies: Codeine and Morphine  Weight: No data found. Arrived from: home  Chief Complaint:   Chief Complaint   Patient presents with    Chest Pain     X1week seen Friday followed up with cardiologist      Hospital Problem/Diagnosis:  Principal Problem:    Chest pain  Resolved Problems:    * No resolved hospital problems. *    Imaging:   XR CHEST PORTABLE   Final Result   No acute process.            Abnormal labs:   Abnormal Labs Reviewed   CBC WITH AUTO DIFFERENTIAL - Abnormal; Notable for the following components:       Result Value    RBC 4.04 (*)     MCH 32.4 (*)     Monocytes % 5.6 (*)     All other components within normal limits   COMPREHENSIVE METABOLIC PANEL - Abnormal; Notable for the following components:    CO2 19 (*)     Glucose 112 (*)     All other components within normal limits     Critical values: no     Abnormal Assessment Findings: Chest pain x1week     Background  History:   Past Medical History:   Diagnosis Date    Essential hypertension 2019    GERD (gastroesophageal reflux disease) 2019    History of cardiac monitoring     Hyperlipidemia 2019    Hypothyroidism 2019       Assessment    Vitals/MEWS: MEWS Score: 1  Level of Consciousness: Alert (0)   Vitals:    23 1000 23 1035 23 1038 23 1102   BP: (!) 113/56   (!) 113/58   Pulse: 79 75 78 80   Resp:  16  18   Temp:    98 °F (36.7 °C)   TempSrc:    Oral   SpO2: 98% 99% 100% 100%     FiO2 (%): N/A  O2 Flow Rate:      Cardiac Rhythm:    Pain Assessment: 0/10 [] Verbal [] Valentin Mile Scale  Pain Scale: Pain Assessment  Pain Assessment: None - Denies Pain  Last documented pain score (0-10 scale)    Last documented pain medication administered: N/A  Mental Status: oriented, alert, coherent, logical, thought processes intact, and able to concentrate and follow conversation  NIH Score:    C-SSRS: Risk of Suicide: No Risk  Bedside swallow:    Chad Coma Scale (GCS): U[place]i Coma Scale  Eye Opening: Spontaneous  Best Verbal Response: Oriented  Best Motor Response: Obeys commands  Chad Coma Scale Score: 15  Active LDA's:   Peripheral IV 01/09/23 Left Hand (Active)     PO Status: Full Liquid  Pertinent or High Risk Medications/Drips: no   If Yes, please provide details: N/A  Pending Blood Product Administration: no     You may also review the ED PT Care Timeline found under the Summary Nursing Index tab. Recommendation    Pending orders N/A  Plan for Discharge (if known):    Additional Comments: N/A   If any further questions, please call Sending RN at [phone]    Electronically signed by: Electronically signed by Sammie Eric RN on 1/9/2023 at 11:04 AM      Sammie Eric RN  01/09/23 5535

## 2023-01-09 NOTE — ED PROVIDER NOTES
EMERGENCY DEPARTMENT [address] EMERGENCY DEPARTMENT        TRIAGE CHIEF COMPLAINT:   Chest Pain Colette Valero seen Friday followed up with cardiologist )      [place]:  Cece Pike is a 74 y.o. female that presents for chest pain and shortness of breath. Onset has been intermittent for the last 3 weeks more constant over the last week. Vince Ga She was seen last Friday for similar complaints, states that she was recommended for hospital admission but she declined. She followed up with her cardiologist Dr. Francisca Bustamante that same day. Was scheduled for outpatient stress test which is not scheduled until this coming Friday. Was started on nitro as well as Imdur which she is making taking through the weekend as well as had a Holter monitor placed. She did turn in the Holter monitor today was told by cardiology to [facility] immediately to the ER. \"  States that she has had persistent chest pain through the weekend that improved with nitro, took a full baby aspirin last evening and nitro this morning. She is currently chest pain-free but continues to have shortness of breath. She states her chest pain shortness of breath has been occurring both at rest and with exertion over the last several weeks. No history of cardiac stents. He is a non-smoker without history of COPD/asthma. No calf pain or swelling. No previous history of DVT/PE. States that when she was here on Friday, her chest pain was in the mid retrosternal area, sharp \"pin\" stabbing pain however today, she began feeling hot and sweaty and having pain in the mid back which was new. No tearing ripping sensation. No history of TAA/AAA. She denies any recent URI cough or cold complaints. No fever or chills. No hemoptysis. No dizziness or near syncope.     ROS:  General:  No fevers, no chills   Cardiovascular:  See HPI   Respiratory:  See HPI  Gastrointestinal:  No pain, no nausea, no vomiting, no diarrhea  Musculoskeletal: No muscle pain, no joint pain  Skin:  No rash, no pruritis, no easy bruising  Neurologic:  No speech problems   Psychiatric:  No anxiety  Genitourinary:  No dysuria, no hematuria  Extremities:  No edema    Past Medical History:   Diagnosis Date    Essential hypertension 07/20/2019    GERD (gastroesophageal reflux disease) 07/20/2019    History of cardiac monitoring     Hyperlipidemia 07/20/2019    Hypothyroidism 07/20/2019     Past Surgical History:   Procedure Laterality Date    HYSTERECTOMY (CERVIX STATUS UNKNOWN)  1970    KNEE ARTHROSCOPY Left 2013    KNEE CARTILAGE SURGERY Left 2022    LUMBAR SPINE SURGERY  2009 & 2014    Herniated disc repair    NECK SURGERY  2004    cervical disc repair     Family History   Problem Relation Age of Onset    Other Mother         unknown cardiac    Other Father         unknown cardiac    Heart Disease Sister     Pacemaker Sister      Social History     Socioeconomic History    Marital status:      Spouse name: Not on file    Number of children: Not on file    Years of education: Not on file    Highest education level: Not on file   Occupational History    Not on file   Tobacco Use    Smoking status: Never    Smokeless tobacco: Never   Substance and Sexual Activity    Alcohol use: Never    Drug use: Never    Sexual activity: Not on file   Other Topics Concern    Not on file   Social History Narrative    Not on file     Social Determinants of Health     Financial Resource Strain: Medium Risk    Difficulty of Paying Living Expenses: Somewhat hard   Food Insecurity: No Food Insecurity    Worried About Running Out of Food in the Last Year: Never true    Ran Out of Food in the Last Year: Never true   Transportation Needs: Not on file   Physical Activity: Inactive    Days of Exercise per Week: 0 days    Minutes of Exercise per Session: 0 min   Stress: Not on file   Social Connections: Not on file   Intimate Partner Violence: Not on file   Housing Stability: Not on file Current Facility-Administered Medications   Medication Dose Route Frequency Provider Last Rate Last Admin    0.9 % sodium chloride infusion   IntraVENous Continuous Kuldeep Khoury PA-C 100 mL/hr at 01/09/23 0933 New Bag at 01/09/23 5427    aspirin tablet 325 mg  325 mg Oral Once Kuldeep Khoury PA-C         Current Outpatient Medications   Medication Sig Dispense Refill    Vitamin D, Cholecalciferol, 25 MCG (1000 UT) TABS Take 1,000 Units by mouth daily      GLUCOSAMINE-CHONDROITIN PO Take 1 capsule by mouth 2 times daily      aspirin 325 MG EC tablet Take 325 mg by mouth nightly      Calcium Carbonate (CALCIUM 600 PO) Take 1 tablet by mouth 2 times daily      Omega-3 Fatty Acids (FISH OIL) 1200 MG CAPS Take 1 capsule by mouth daily      isosorbide mononitrate (IMDUR) 30 MG extended release tablet Take 1 tablet by mouth daily 30 tablet 3    atorvastatin (LIPITOR) 40 MG tablet Take 1 tablet by mouth daily 90 tablet 1    levothyroxine (SYNTHROID) 50 MCG tablet Take 1 tablet by mouth daily 90 tablet 1    pantoprazole (PROTONIX) 40 MG tablet Take 1 tablet by mouth daily 90 tablet 1    valsartan-hydroCHLOROthiazide (DIOVAN-HCT) 160-12.5 MG per tablet Take 1 tablet by mouth daily 90 tablet 1    meclizine (ANTIVERT) 25 MG tablet Take 25 mg by mouth 3 times daily as needed       Allergies   Allergen Reactions    Codeine Other (See Comments)     hallucinations    Morphine        Nursing Notes Reviewed  PHYSICAL EXAM    VITAL SIGNS: BP (!) 113/56   Pulse 78   Temp 98.6 °F (37 °C) (Oral)   Resp 16   SpO2 100%    Constitutional:  Well developed, elevated BMI, very anxious, rapid speed  Head:  Normocephalic, Atraumatic  Eyes:  EOMI. Sclera clear. Conjunctiva normal, No discharge.    Neck/Lymphatics: Supple, no JVD, No lymphadenopathy  Cardiovascular: Tachycardic rate 110 bpm, normal S1/S2  Peripheral Vascular: Distal pulses 2+, Capillary refill <2seconds  Respiratory:  Respirations mildly labored, 24 breaths/min, speaking full sentence out difficulty. Diminished air exchange with scattered expiratory wheezing. No rales stridor retractions or accessory muscle use. Abdomen: Bowel sounds normal in all quadrants, Soft, Non tender/Nondistended, No palpable abdominal masses. Musculoskeletal: BUE/BLE symmetrical without atrophy or deformities. Calves are supple. No pretibial eating edema  Integument:  Warm, Dry, Intact, Skin turgor and texture normal  Neurologic:  Alert & oriented x3 , No focal deficits noted. Cranial nerves II through XII grossly intact. No slurred speech. No facial droop. Normal gross motor coordination & motor strength bilateral upper and lower extremities.     Psychiatric:  Affect appropriate      I have reviewed and interpreted all of the currently available lab results from this visit (if applicable):  Results for orders placed or performed during the hospital encounter of 01/09/23   CBC with Auto Differential   Result Value Ref Range    WBC 7.4 4.0 - 10.5 K/CU MM    RBC 4.04 (L) 4.2 - 5.4 M/CU MM    Hemoglobin 13.1 12.5 - 16.0 GM/DL    Hematocrit 38.1 37 - 47 %    MCV 94.3 78 - 100 FL    MCH 32.4 (H) 27 - 31 PG    MCHC 34.4 32.0 - 36.0 %    RDW 12.5 11.7 - 14.9 %    Platelets 684 335 - 912 K/CU MM    MPV 9.4 7.5 - 11.1 FL    Differential Type AUTOMATED DIFFERENTIAL     Segs Relative 64.8 36 - 66 %    Lymphocytes % 27.0 24 - 44 %    Monocytes % 5.6 (H) 0 - 4 %    Eosinophils % 1.9 0 - 3 %    Basophils % 0.4 0 - 1 %    Segs Absolute 4.8 K/CU MM    Lymphocytes Absolute 2.0 K/CU MM    Monocytes Absolute 0.4 K/CU MM    Eosinophils Absolute 0.1 K/CU MM    Basophils Absolute 0.0 K/CU MM    Nucleated RBC % 0.0 %    Total Nucleated RBC 0.0 K/CU MM    Total Immature Neutrophil 0.02 K/CU MM    Immature Neutrophil % 0.3 0 - 0.43 %   Comprehensive Metabolic Panel   Result Value Ref Range    Sodium 136 135 - 145 MMOL/L    Potassium 3.8 3.5 - 5.1 MMOL/L    Chloride 103 99 - 110 mMol/L    CO2 19 (L) 21 - 32 MMOL/L BUN 14 6 - 23 MG/DL    Creatinine 0.8 0.6 - 1.1 MG/DL    Est, Glom Filt Rate >60 >60 mL/min/1.73m2    Glucose 112 (H) 70 - 99 MG/DL    Calcium 9.9 8.3 - 10.6 MG/DL    Albumin 4.2 3.4 - 5.0 GM/DL    Total Protein 6.9 6.4 - 8.2 GM/DL    Total Bilirubin 0.5 0.0 - 1.0 MG/DL    ALT 38 10 - 40 U/L    AST 30 15 - 37 IU/L    Alkaline Phosphatase 65 40 - 129 IU/L    Anion Gap 14 4 - 16   Troponin   Result Value Ref Range    Troponin T <0.010 <0.01 NG/ML   Brain Natriuretic Peptide   Result Value Ref Range    Pro-.0 <300 PG/ML   CK   Result Value Ref Range    Total CK 53 26 - 140 IU/L   Magnesium   Result Value Ref Range    Magnesium 1.8 1.8 - 2.4 mg/dl   T4, Free   Result Value Ref Range    T4 Free 1.60 0.9 - 1.8 NG/DL   TSH   Result Value Ref Range    TSH, High Sensitivity 2.520 0.270 - 4.20 uIu/ml   EKG 12 Lead   Result Value Ref Range    Ventricular Rate 108 BPM    Atrial Rate 108 BPM    P-R Interval 136 ms    QRS Duration 76 ms    Q-T Interval 330 ms    QTc Calculation (Bazett) 442 ms    P Axis 49 degrees    R Axis 4 degrees    T Axis 19 degrees    Diagnosis       Sinus tachycardia  Nonspecific ST and T wave abnormality  Abnormal ECG  When compared with ECG of 06-JAN-2023 04:38,  Nonspecific T wave abnormality, improved in Lateral leads  Confirmed by [facility] Maynor BANKS ([id number]) on 1/9/2023 9:30:25 AM          Radiographs (if obtained):  [] The following radiograph was interpreted by myself in the absence of a radiologist:   [] Radiologist's Report Reviewed:  XR CHEST PORTABLE   Final Result   No acute process. EKG Interpretation  Please see ED physician's note - Dr. Piero Santos - for EKG interpretation        Chart review shows recent radiographs:  XR CHEST PORTABLE    Result Date: 1/6/2023  EXAMINATION: ONE XRAY VIEW OF THE CHEST 1/6/2023 4:50 am COMPARISON: None.  HISTORY: ORDERING SYSTEM PROVIDED HISTORY: chest pain TECHNOLOGIST PROVIDED HISTORY: Reason for exam:->chest pain Reason for Exam: chest pain FINDINGS: The heart is normal in size and configuration. The mediastinal contours are within normal limits. The lungs are well aerated. The pleural surfaces are normal and no evidence of a pleural effusion is seen. No acute bone or soft tissue abnormality is noted. No acute cardiopulmonary abnormality. CTA CHEST W CONTRAST    Result Date: 1/6/2023  EXAMINATION: CTA OF THE CHEST 1/6/2023 7:34 am TECHNIQUE: CTA of the chest was performed after the administration of intravenous contrast.  Multiplanar reformatted images are provided for review. MIP images are provided for review. Automated exposure control, iterative reconstruction, and/or weight based adjustment of the mA/kV was utilized to reduce the radiation dose to as low as reasonably achievable. COMPARISON: Same day chest radiograph. HISTORY: ORDERING SYSTEM PROVIDED HISTORY: acute cp/recent sx TECHNOLOGIST PROVIDED HISTORY: Reason for exam:->acute cp/recent sx Decision Support Exception - unselect if not a suspected or confirmed emergency medical condition->Emergency Medical Condition (MA) Reason for Exam: acute cp/recent sx Additional signs and symptoms: 65ml Isovue 370 FINDINGS: Pulmonary Arteries: Pulmonary trunk is mildly dilated, which can be seen in pulmonary artery hypertension. No evidence of pulmonary embolism within the visualized peripheral arteries. No discrete evidence of right-sided heart strain. Mediastinum: No evidence of mediastinal lymphadenopathy. The heart and pericardium demonstrate no acute abnormality. There is no acute abnormality of the thoracic aorta. Lungs/pleura: The trachea and central bronchi appear patent. No pneumothorax or pleural effusion. Mild dependent atelectasis. Streaky and ground-glass opacities within the peripheral left lower lobe and medial right upper lobe favor subsegmental atelectasis. Upper Abdomen: Limited images of the upper abdomen are without acute abnormality.  Soft Tissues/Bones: No acute bone or soft tissue abnormality. No evidence of pulmonary embolism. Left basilar and right upper lobe subsegmental atelectasis. A developing infection could have a similar appearance. ED COURSE & MEDICAL DECISION MAKING       Vital signs and nursing notes reviewed during ED course. I have independently evaluated this patient . Supervising physician - Dr. Henry Pugh - was present in ED and available for consultation throughout entirety of patient's care. All pertinent Lab data and radiographic results reviewed with patient at bedside. The patient and/or the family were informed of the results of any tests/labs/imaging, the treatment plan, and time was allotted to answer questions. Clinical Impression:  1. Chest pain, unspecified type    2. Dyspnea and respiratory abnormalities        CC/HPI Summary, DDx, ED Course, and Reassessment: Patient presents with chest pain or shortness of breath. Patient was recently evaluated in the ED as well as cardiology for similar complaints, had declined admission last week during last ED assessment. On my exam, patient is very anxious, speaking rapidly, full complete sentences, 100% on room air. BP is stable. Noted tachycardic while staff is in the room however while on telemetry with no staff, heart rate normalizes into the 80-90 range. She is currently denying any chest pain on my assessment, only endorsing shortness of breath. Lungs with diminished air exchange and scattered expiratory wheezing. No rales or rhonchi. Abdominal exam is nonsurgical.  No palpable stool masses. Symmetrical pulses in the upper or lower extremities. Calves are symmetrical.  Patient was placed on to telemetry and continuous pulse ox monitoring. She did take nitro prior to ED arrival and is chest pain-free at this time. Started on gentle IV fluids as well as albuterol/Atrovent inhaler.   On chart review, patient did have a CTA chest during last ED evaluation, showed no evidence of acute PE but there is left basilar and right upper lobe subsegmental atelectasis which could have similar appearance to a developing infection. Labs today are reassuring. CBC with normal white count, hemoglobin. CMP with slightly low CO2 of 19 with otherwise normal anion gap. Patient is tachypneic/hyperventilating which may be the cause of this. Normal troponin BNP CK magnesium and free T4/TSH/chest x-ray shows no evidence of acute cardiopulmonary process consolidation or vascular congestion. No new acute ischemic changes on EKG. Patient's heart rate does normalize. At this time, given patient's history and ongoing chest pain, will plan to admit for cardiology evaluation/stress test.  Patient is very happy and agreeable with this. I did speak with on-call cardiology, Dr. Emmanuel Charlton who is agreeable with plan for admission. I did consult with Dr. Edgar Beavers - hospitalist - and discussed patient's history, ED presentation/course including any pertinent laboratory findings and imaging study findings. He/she agrees to hospital admission. Patient is admitted to the hospital in stable condition. History from : Patient and Family SO    Limitations to history : None    Patient was given the following medications:  Medications   0.9 % sodium chloride infusion ( IntraVENous New Bag 1/9/23 2193)   aspirin tablet 325 mg (has no administration in time range)   albuterol sulfate HFA (PROVENTIL;VENTOLIN;PROAIR) 108 (90 Base) MCG/ACT inhaler 2 puff (2 puffs Inhalation Given 1/9/23 1034)     And   ipratropium (ATROVENT HFA) 17 MCG/ACT inhaler 2 puff (2 puffs Inhalation Given 1/9/23 1035)             Discussion with Other Profesionals : Admitting Team Dr Edgar Beavers and Consultant Cardiology Dr Emmanuel Charlton    Social Determinants : None    Records Reviewed : Outpatient Notes Recent cardiology office visit  1/6/23     I am the Primary Clinician of Record.        Diagnosis and plan discussed in detail with patient who understands and agrees. In consideration of current COVID19 pandemic, with effort to minimize unnecessary provider exposure, this patient was seen at bedside by me independently. However, in compliance with current hospital ANGELIA/ED protocol, prior to admission I did discuss this patient case with emergency department physician, Dr. Siva Huang, who did agree with ED workup/evaluation and plan for admission however but ED attending physician did not independently evaluate the patient. Of note, this Pt was NOT admitted to the ICU. Disposition referral (if applicable):  No follow-up provider specified.     Disposition medications (if applicable):  New Prescriptions    No medications on file         (Please note that portions of this note may have been completed with a voice recognition program. Efforts were made to edit the dictations but occasionally words are mis-transcribed.)          Luke Tovar PA-C  01/09/23 9978

## 2023-01-09 NOTE — ED NOTES
Medication History  [facility]    Patient Name: Sherry Martinez 1950     Medication history has been completed by: Yaima Taylor CPhT    Source(s) of information: patient and insurance claims     Primary Care Physician: Garwin Homans, APRN - CNP     Pharmacy: [facility]    Allergies as of 01/09/2023 - Fully Reviewed 01/09/2023   Allergen Reaction Noted    Codeine Other (See Comments) 07/20/2019    Morphine  01/06/2023        Prior to Admission medications    Medication Sig Start Date End Date Taking? Authorizing Provider   Vitamin D, Cholecalciferol, 25 MCG (1000 UT) TABS Take 1,000 Units by mouth daily   Yes Historical Provider, MD   GLUCOSAMINE-CHONDROITIN PO Take 1 capsule by mouth 2 times daily   Yes Historical Provider, MD   aspirin 325 MG EC tablet Take 325 mg by mouth nightly    Historical Provider, MD   Calcium Carbonate (CALCIUM 600 PO) Take 1 tablet by mouth 2 times daily    Historical Provider, MD   Omega-3 Fatty Acids (FISH OIL) 1200 MG CAPS Take 1 capsule by mouth daily    Historical Provider, MD   isosorbide mononitrate (IMDUR) 30 MG extended release tablet Take 1 tablet by mouth daily 1/6/23   Fidelia Louise MD   atorvastatin (LIPITOR) 40 MG tablet Take 1 tablet by mouth daily 9/21/22   GOLDY Regalado   levothyroxine (SYNTHROID) 50 MCG tablet Take 1 tablet by mouth daily 9/21/22   GOLDY Regalado   pantoprazole (PROTONIX) 40 MG tablet Take 1 tablet by mouth daily 9/21/22   GOLDY Regalado   valsartan-hydroCHLOROthiazide (DIOVAN-HCT) 160-12.5 MG per tablet Take 1 tablet by mouth daily 9/21/22   GOLDY Regalado   meclizine (ANTIVERT) 25 MG tablet Take 25 mg by mouth 3 times daily as needed    Historical Provider, MD     Medications added or changed (ex.  new medication, dosage change, interval change, formulation change):  Vitamin d (added)  Glucosamine (added)    Medications removed from list (include reason, ex. noncompliance, medication cost, therapy complete etc.):   Meloxicam  not taking    Comments:  Medication list reviewed with patient and insurance claims verified. Patient reports she has taken first dose of medications today.     To my knowledge the above medication history is accurate as of 1/9/2023 10:31 AM.   Dragan Rushing CPhT   1/9/2023 10:31 AM

## 2023-01-09 NOTE — ED NOTES
1049 paged Dr Nick Jaimes  01/09/23 1052    105 Dr Kirstin Greenberg returned call      Denzel Nevarez  01/09/23 1059

## 2023-01-09 NOTE — PROGRESS NOTES
4 Eyes Skin Assessment     NAME:  Stas Record  YOB: 1950  MEDICAL RECORD NUMBER:  [id number]    The patient is being assessed for  Admission    I agree that One RN have performed a thorough Head to Toe Skin Assessment on the patient. ALL assessment sites listed below have been assessed. Areas assessed by both nurses:    Head, Face, Ears, Shoulders, Back, Chest, Arms, Elbows, Hands, Sacrum. Buttock, Coccyx, Ischium, and Legs. Feet and Heels        Does the Patient have a Wound?  No noted wound(s)       Sami Prevention initiated by RN: No   Wound Care Orders initiated by RN: No    Pressure Injury (Stage 3,4, Unstageable, DTI, NWPT, and Complex wounds) if present place referral order by RN under : No    New and Established Ostomies, if present place, referral order under : No      Nurse 1 eSignature: Electronically signed by Kasi Woody LPN on 8/1/89 at 2:24 PM EST    **SHARE this note so that the co-signing nurse is able to place an eSignature**    Nurse 2 eSignature: Electronically signed by Gayathri Mcmahon RN on 1/9/23 at 5:50 PM EST

## 2023-01-09 NOTE — CARE COORDINATION
Chart reviewed. Patient from home. Independent prior to admission. Patient has PCP and insurance. No discharge needs identified at this time. CM available should any new needs arise.

## 2023-01-09 NOTE — H&P
V2.0  History and Physical      Name:  Evert Hayden /Age/Sex: 1950  (74 y.o. female)   MRN & CSN:  [id number] & [id number] Encounter Date/Time: 2023 10:36 AM EST   Location:  ED33/ED-33 PCP: Dean Flores [address] [facility] Day: 1      History of Present Illness:     Chief Complaint: chest pain    Evert Hayden is a 74 y.o. female with PMH of Hypothyroidism, GERD, Essential HTN who presents with one week of chest pain. Pt states her chest pain started a week ago described as sharp mainly in the left area radiating to left shoulder. When pain started, it was intermittent attacks lasting 15 to 20 minutes. However, pain has been getting worse. She reports that her chest pain is associated with sweating and SOB. Pt went to ER  where she had CTPA which was negative for PE but showed Left basilar and right upper lobe subsegmental atelectasis. Pt was discharged home to see cardiology. She saw cardiologist same day  who ordered stress test, ASA, Imdur and holter monitor. She was scheduled to undergo stress test on Friday. She contacted cardiology clinic as her pain was getting worse and was advised to come to ER. When pt was evaluated, she was complaining of SOB but states she just received albuterol treatment. She denies lightheadedness, dizziness, fever, night sweats, chills, cough, palpitations, abd pain, nausea, vomiting, diarrhea, dysuria. At ED, pt was afebrile, hemodynamically stable; on room air. Labs remarkable for HCO3 19, troponin negative, ProBNP 111 otherwise all normal. ECG sinus tachycardia, nonspecific ST & T wave changes improved compared to 2023. CXR with no acute process. Pt received breathing treatment. Cardiology was consulted in the ED. Assessment and Plan:   Chest pain; rule out ACS. Sharp, left chest pain radiating to left shoulder associated with SOB and sweating. Troponin negative.   - check trop X1  - cardiology consulted; appreciate recs  - stress test, ECHo ordered by carediology  - continue home ,  statin  - tele  - recommend GI outpatient referral if cardiac workup is negative    Essential HTN. BP stable 110's on admission. Pt on home valsartan-hydroCHLOROthiazide , Imdur  - will resume tomorrow    Hypothyroidism. Pt on home synthroid  - continue    GERD. On home protonix. - continue    Disposition:   Current Living situation: home  Expected Disposition: home  Estimated D/C: 1-2 days    Diet No diet orders on file   DVT Prophylaxis [x] Lovenox, []  Heparin, [] SCDs, [] Ambulation,  [] Eliquis, [] Xarelto   Code Status No Order   Surrogate Decision Maker/ POA      History from:     patient, spouse     Review of Systems: Need 10 Elements   See above         Objective:   No intake or output data in the 24 hours ending 01/09/23 1225   Vitals:   Vitals:    01/09/23 1000 01/09/23 1035 01/09/23 1038 01/09/23 1102   BP: (!) 113/56   (!) 113/58   Pulse: 79 75 78 80   Resp:  16  18   Temp:    98 °F (36.7 °C)   TempSrc:    Oral   SpO2: 98% 99% 100% 100%       Medications Prior to Admission     Prior to Admission medications    Medication Sig Start Date End Date Taking?  Authorizing Provider   Vitamin D, Cholecalciferol, 25 MCG (1000 UT) TABS Take 1,000 Units by mouth daily   Yes Historical Provider, MD   GLUCOSAMINE-CHONDROITIN PO Take 1 capsule by mouth 2 times daily   Yes Historical Provider, MD   aspirin 325 MG EC tablet Take 325 mg by mouth nightly    Historical Provider, MD   Calcium Carbonate (CALCIUM 600 PO) Take 1 tablet by mouth 2 times daily    Historical Provider, MD   Omega-3 Fatty Acids (FISH OIL) 1200 MG CAPS Take 1 capsule by mouth daily    Historical Provider, MD   isosorbide mononitrate (IMDUR) 30 MG extended release tablet Take 1 tablet by mouth daily 1/6/23   Fidelia Louise MD   atorvastatin (LIPITOR) 40 MG tablet Take 1 tablet by mouth daily 9/21/22   GOLDY Regalado   levothyroxine (SYNTHROID) 50 MCG tablet Take 1 tablet by mouth daily 9/21/22   GOLDY Oconnell   pantoprazole (PROTONIX) 40 MG tablet Take 1 tablet by mouth daily 9/21/22   GOLDY Oconnell   valsartan-hydroCHLOROthiazide (DIOVAN-HCT) 160-12.5 MG per tablet Take 1 tablet by mouth daily 9/21/22   GOLDY Oconnell   meclizine (ANTIVERT) 25 MG tablet Take 25 mg by mouth 3 times daily as needed    Historical Provider, MD       Physical Exam: Need 8 Elements   Physical Exam     General: NAD  Eyes: EOMI  ENT: neck supple  Cardiovascular: Regular rate. Respiratory: Clear to auscultation  Gastrointestinal: Soft, non tender  Genitourinary: no suprapubic tenderness  Musculoskeletal: No , bilateral LE tender to palpation, trace edema  Skin: warm, dry  Neuro: Alert. Psych: Mood appropriate. Past Medical History:   PMHx   Past Medical History:   Diagnosis Date    Essential hypertension 07/20/2019    GERD (gastroesophageal reflux disease) 07/20/2019    History of cardiac monitoring     Hyperlipidemia 07/20/2019    Hypothyroidism 07/20/2019     PSHX:  has a past surgical history that includes Hysterectomy (1970); Neck surgery (2004); Knee arthroscopy (Left, 2013); Lumbar spine surgery (2009 & 2014); and Knee cartilage surgery (Left, 2022). Allergies: Allergies   Allergen Reactions    Codeine Other (See Comments)     hallucinations    Morphine      Fam HX:  family history includes Heart Disease in her sister; Other in her father and mother; Pacemaker in her sister.   Soc HX:   Social History     Socioeconomic History    Marital status:      Spouse name: None    Number of children: None    Years of education: None    Highest education level: None   Tobacco Use    Smoking status: Never    Smokeless tobacco: Never   Substance and Sexual Activity    Alcohol use: Never    Drug use: Never     Social Determinants of Health     Financial Resource Strain: Medium Risk    Difficulty of Paying Living Expenses: Somewhat hard   Food Insecurity: No Food Insecurity    Worried About Running Out of Food in the Last Year: Never true    Ran Out of Food in the Last Year: Never true   Physical Activity: Inactive    Days of Exercise per Week: 0 days    Minutes of Exercise per Session: 0 min       Medications:   Medications:    aspirin  325 mg Oral Once      Infusions:       PRN Meds:      Labs      CBC:   Recent Labs     01/09/23 0922   WBC 7.4   HGB 13.1        BMP:    Recent Labs     01/09/23 0922      K 3.8      CO2 19*   BUN 14   CREATININE 0.8   GLUCOSE 112*     Hepatic:   Recent Labs     01/09/23 0922   AST 30   ALT 38   BILITOT 0.5   ALKPHOS 65     Lipids:   Lab Results   Component Value Date/Time    CHOL 131 09/19/2022 08:12 AM    HDL 69 09/19/2022 08:12 AM    TRIG 70 09/19/2022 08:12 AM     Hemoglobin A1C:   Lab Results   Component Value Date/Time    LABA1C 5.2 09/19/2022 08:12 AM     TSH:   Lab Results   Component Value Date/Time    TSH 3.28 09/13/2021 08:00 AM     Troponin:   Lab Results   Component Value Date/Time    TROPONINT <0.010 01/09/2023 09:22 AM    TROPONINT <0.010 01/06/2023 05:05 AM     Lactic Acid: No results for input(s): LACTA in the last 72 hours. BNP:   Recent Labs     01/09/23 0922   PROBNP 111.0     UA:No results found for: Faby Bouquet, PHUR, LABCAST, WBCUA, RBCUA, MUCUS, TRICHOMONAS, YEAST, BACTERIA, CLARITYU, SPECGRAV, LEUKOCYTESUR, UROBILINOGEN, BILIRUBINUR, BLOODU, GLUCOSEU, KETUA, AMORPHOUS  Urine Cultures: No results found for: LABURIN  Blood Cultures: No results found for: BC  No results found for: BLOODCULT2  Organism: No results found for: ORG    Imaging/Diagnostics Last 24 Hours   XR CHEST PORTABLE    Result Date: 1/9/2023  EXAMINATION: ONE XRAY VIEW OF THE CHEST 1/9/2023 9:19 am COMPARISON: 01/06/2023 HISTORY: ORDERING SYSTEM PROVIDED HISTORY: chest pain TECHNOLOGIST PROVIDED HISTORY: Reason for exam:->chest pain Reason for Exam: chest pain FINDINGS: The lungs are without acute focal process. There is no effusion or pneumothorax.  The cardiomediastinal silhouette is stable. The osseous structures are stable. No acute process. XR CHEST PORTABLE    Result Date: 1/6/2023  EXAMINATION: ONE XRAY VIEW OF THE CHEST 1/6/2023 4:50 am COMPARISON: None. HISTORY: ORDERING SYSTEM PROVIDED HISTORY: chest pain TECHNOLOGIST PROVIDED HISTORY: Reason for exam:->chest pain Reason for Exam: chest pain FINDINGS: The heart is normal in size and configuration. The mediastinal contours are within normal limits. The lungs are well aerated. The pleural surfaces are normal and no evidence of a pleural effusion is seen. No acute bone or soft tissue abnormality is noted. No acute cardiopulmonary abnormality. CTA CHEST W CONTRAST    Result Date: 1/6/2023  EXAMINATION: CTA OF THE CHEST 1/6/2023 7:34 am TECHNIQUE: CTA of the chest was performed after the administration of intravenous contrast.  Multiplanar reformatted images are provided for review. MIP images are provided for review. Automated exposure control, iterative reconstruction, and/or weight based adjustment of the mA/kV was utilized to reduce the radiation dose to as low as reasonably achievable. COMPARISON: Same day chest radiograph. HISTORY: ORDERING SYSTEM PROVIDED HISTORY: acute cp/recent sx TECHNOLOGIST PROVIDED HISTORY: Reason for exam:->acute cp/recent sx Decision Support Exception - unselect if not a suspected or confirmed emergency medical condition->Emergency Medical Condition (MA) Reason for Exam: acute cp/recent sx Additional signs and symptoms: 65ml Isovue 370 FINDINGS: Pulmonary Arteries: Pulmonary trunk is mildly dilated, which can be seen in pulmonary artery hypertension. No evidence of pulmonary embolism within the visualized peripheral arteries. No discrete evidence of right-sided heart strain. Mediastinum: No evidence of mediastinal lymphadenopathy. The heart and pericardium demonstrate no acute abnormality. There is no acute abnormality of the thoracic aorta.  Lungs/pleura: The trachea and central bronchi appear patent. No pneumothorax or pleural effusion. Mild dependent atelectasis. Streaky and ground-glass opacities within the peripheral left lower lobe and medial right upper lobe favor subsegmental atelectasis. Upper Abdomen: Limited images of the upper abdomen are without acute abnormality. Soft Tissues/Bones: No acute bone or soft tissue abnormality. No evidence of pulmonary embolism. Left basilar and right upper lobe subsegmental atelectasis. A developing infection could have a similar appearance.        Personally reviewed Lab Studies, Imaging, and discussed case with patient, spouse    Electronically signed by Santosh Camarena MD on 1/9/2023 at 12:25 PM

## 2023-01-10 ENCOUNTER — APPOINTMENT (OUTPATIENT)
Dept: NUCLEAR MEDICINE | Age: 73
End: 2023-01-10
Payer: MEDICARE

## 2023-01-10 VITALS
HEART RATE: 88 BPM | BODY MASS INDEX: 32.49 KG/M2 | RESPIRATION RATE: 18 BRPM | SYSTOLIC BLOOD PRESSURE: 125 MMHG | TEMPERATURE: 97.6 F | WEIGHT: 207 LBS | DIASTOLIC BLOOD PRESSURE: 71 MMHG | HEIGHT: 67 IN | OXYGEN SATURATION: 98 %

## 2023-01-10 LAB
ANION GAP SERPL CALCULATED.3IONS-SCNC: 9 MMOL/L (ref 4–16)
BASOPHILS ABSOLUTE: 0 K/CU MM
BASOPHILS RELATIVE PERCENT: 0.4 % (ref 0–1)
BUN BLDV-MCNC: 11 MG/DL (ref 6–23)
CALCIUM SERPL-MCNC: 9.5 MG/DL (ref 8.3–10.6)
CHLORIDE BLD-SCNC: 107 MMOL/L (ref 99–110)
CO2: 24 MMOL/L (ref 21–32)
CREAT SERPL-MCNC: 0.9 MG/DL (ref 0.6–1.1)
DIFFERENTIAL TYPE: ABNORMAL
EOSINOPHILS ABSOLUTE: 0.2 K/CU MM
EOSINOPHILS RELATIVE PERCENT: 2.7 % (ref 0–3)
GFR SERPL CREATININE-BSD FRML MDRD: >60 ML/MIN/1.73M2
GLUCOSE BLD-MCNC: 90 MG/DL (ref 70–99)
HCT VFR BLD CALC: 34.9 % (ref 37–47)
HEMOGLOBIN: 11.3 GM/DL (ref 12.5–16)
IMMATURE NEUTROPHIL %: 0.4 % (ref 0–0.43)
LV EF: 60 %
LVEF MODALITY: NORMAL
LYMPHOCYTES ABSOLUTE: 2.1 K/CU MM
LYMPHOCYTES RELATIVE PERCENT: 37.9 % (ref 24–44)
MCH RBC QN AUTO: 32.1 PG (ref 27–31)
MCHC RBC AUTO-ENTMCNC: 32.4 % (ref 32–36)
MCV RBC AUTO: 99.1 FL (ref 78–100)
MONOCYTES ABSOLUTE: 0.4 K/CU MM
MONOCYTES RELATIVE PERCENT: 6.9 % (ref 0–4)
NUCLEATED RBC %: 0 %
PDW BLD-RTO: 12.8 % (ref 11.7–14.9)
PLATELET # BLD: 190 K/CU MM (ref 140–440)
PMV BLD AUTO: 9.4 FL (ref 7.5–11.1)
POTASSIUM SERPL-SCNC: 4.1 MMOL/L (ref 3.5–5.1)
RBC # BLD: 3.52 M/CU MM (ref 4.2–5.4)
SEGMENTED NEUTROPHILS ABSOLUTE COUNT: 2.9 K/CU MM
SEGMENTED NEUTROPHILS RELATIVE PERCENT: 51.7 % (ref 36–66)
SODIUM BLD-SCNC: 140 MMOL/L (ref 135–145)
TOTAL IMMATURE NEUTOROPHIL: 0.02 K/CU MM
TOTAL NUCLEATED RBC: 0 K/CU MM
TROPONIN T: <0.01 NG/ML
WBC # BLD: 5.6 K/CU MM (ref 4–10.5)

## 2023-01-10 PROCEDURE — 6370000000 HC RX 637 (ALT 250 FOR IP): Performed by: STUDENT IN AN ORGANIZED HEALTH CARE EDUCATION/TRAINING PROGRAM

## 2023-01-10 PROCEDURE — 93017 CV STRESS TEST TRACING ONLY: CPT

## 2023-01-10 PROCEDURE — 94761 N-INVAS EAR/PLS OXIMETRY MLT: CPT

## 2023-01-10 PROCEDURE — 36415 COLL VENOUS BLD VENIPUNCTURE: CPT

## 2023-01-10 PROCEDURE — APPSS30 APP SPLIT SHARED TIME 16-30 MINUTES

## 2023-01-10 PROCEDURE — G0378 HOSPITAL OBSERVATION PER HR: HCPCS

## 2023-01-10 PROCEDURE — A9500 TC99M SESTAMIBI: HCPCS | Performed by: INTERNAL MEDICINE

## 2023-01-10 PROCEDURE — 99232 SBSQ HOSP IP/OBS MODERATE 35: CPT | Performed by: INTERNAL MEDICINE

## 2023-01-10 PROCEDURE — 80048 BASIC METABOLIC PNL TOTAL CA: CPT

## 2023-01-10 PROCEDURE — G1010 CDSM STANSON: HCPCS

## 2023-01-10 PROCEDURE — 84484 ASSAY OF TROPONIN QUANT: CPT

## 2023-01-10 PROCEDURE — 85025 COMPLETE CBC W/AUTO DIFF WBC: CPT

## 2023-01-10 PROCEDURE — 3430000000 HC RX DIAGNOSTIC RADIOPHARMACEUTICAL: Performed by: INTERNAL MEDICINE

## 2023-01-10 PROCEDURE — 6360000002 HC RX W HCPCS: Performed by: INTERNAL MEDICINE

## 2023-01-10 RX ORDER — TECHNETIUM TC-99M SESTAMIBI 1 MG/10ML
30 INJECTION INTRAVENOUS
Status: COMPLETED | OUTPATIENT
Start: 2023-01-10 | End: 2023-01-10

## 2023-01-10 RX ORDER — TECHNETIUM TC-99M SESTAMIBI 1 MG/10ML
10 INJECTION INTRAVENOUS
Status: COMPLETED | OUTPATIENT
Start: 2023-01-10 | End: 2023-01-10

## 2023-01-10 RX ORDER — ASPIRIN 81 MG/1
81 TABLET ORAL DAILY
Qty: 30 TABLET | Refills: 3 | Status: SHIPPED | OUTPATIENT
Start: 2023-01-11

## 2023-01-10 RX ADMIN — REGADENOSON 0.4 MG: 0.08 INJECTION, SOLUTION INTRAVENOUS at 09:25

## 2023-01-10 RX ADMIN — KIT FOR THE PREPARATION OF TECHNETIUM TC99M SESTAMIBI 30 MILLICURIE: 1 INJECTION, POWDER, LYOPHILIZED, FOR SOLUTION PARENTERAL at 09:25

## 2023-01-10 RX ADMIN — KIT FOR THE PREPARATION OF TECHNETIUM TC99M SESTAMIBI 10 MILLICURIE: 1 INJECTION, POWDER, LYOPHILIZED, FOR SOLUTION PARENTERAL at 07:50

## 2023-01-10 RX ADMIN — LEVOTHYROXINE SODIUM 50 MCG: 0.05 TABLET ORAL at 11:08

## 2023-01-10 RX ADMIN — ATORVASTATIN CALCIUM 40 MG: 40 TABLET, FILM COATED ORAL at 11:09

## 2023-01-10 RX ADMIN — PANTOPRAZOLE SODIUM 40 MG: 40 TABLET, DELAYED RELEASE ORAL at 11:10

## 2023-01-10 NOTE — DISCHARGE SUMMARY
Patient made aware. V2.0  Discharge Summary    Name:  Ilan Herr /Age/Sex: 1950 (4 y.o. female)   Admit Date: 2023  Discharge Date: 1/10/23    MRN & CSN:  [id number] & [id number] Encounter Date and Time 1/10/23 4:06 PM EST    Attending:  No att. providers found Discharging Provider: PRIYA Holden - [facility]       Hospital Course:     Brief HPI: Ilan Herr is a 74 y.o. female who presented with Chest Pain. Had negative troponins, EKG, ECHO and Cardiolite Stress Test. She will follow up with Dr. Eber Massey Cardiologist outpatient and recommend GI outpatient evaluation also,for possible GERD as the cause. She sees her PCP in March, so may wait until then to get a GI referral. Today she fells well and denies any chest pain, shortness of breath and her appetite is good. Brief Problem Based Course:     Chest pain; ruled out ACS, all cardiac workup negative  -Sharp, left chest pain radiating to left shoulder associated with SOB and sweating. Troponin negative. - Cardiology Consulted- Dr. Ramon Lantigua, ordered ECHO & Stress Test  - ECHO: EF 55-60%, normal  -Cardiolite Stress: normal, no evidence of ischemia   - continue home  and Lipitor 40 mg   - recommend GI outpatient referral for possible GERD or GI pathology as the cause of her chest pain     Essential HTN. BP stable 110's on admission. Pt on home valsartan-hydroCHLOROthiazide , Imdur  - will resume home BP Meds of valsartan/hctz 160/12.5   -DC Imdur as she has evidence of cardiac ischemia      Hypothyroidism. Pt on home synthroid  - continue Synthroid 50 mcg PO Qam      GERD. On home protonix. - continue Protonix 40 mg PO QD     This patient was seen and examined and/or discussed in conjunction with Dr. Mayur Bautista. He  was agreeable with the patient's plan at discharge as dictated above. The patient expressed appropriate understanding of, and agreement with the discharge recommendations, medications, and plan.      Consults this admission:  IP CONSULT TO CARDIOLOGY    Discharge Diagnosis:   Chest pain- Resolved with Negative Cardiac Workup     Discharge Instruction:   Follow up appointments: GI- Dr. Clemons for outpatient eval for worsening Acid Reflux on PPI  Primary care physician: PRIYA Winters CNP within 2 weeks  Diet: regular diet   Activity: activity as tolerated  Disposition: Discharged to:   [x]Home, [][facility], []SNF, []Acute Rehab, []Hospice   Condition on discharge: Stable  Labs and Tests to be Followed up as an outpatient by PCP or Specialist:     Discharge Medications:        Medication List        CHANGE how you take these medications      aspirin 81 MG EC tablet  Take 1 tablet by mouth daily  Start taking on: January 11, 2023  What changed:   medication strength  how much to take  when to take this            CONTINUE taking these medications      atorvastatin 40 MG tablet  Commonly known as: LIPITOR  Take 1 tablet by mouth daily     CALCIUM 600 PO     Fish Oil 1200 MG Caps     GLUCOSAMINE-CHONDROITIN PO     levothyroxine 50 MCG tablet  Commonly known as: SYNTHROID  Take 1 tablet by mouth daily     meclizine 25 MG tablet  Commonly known as: ANTIVERT     pantoprazole 40 MG tablet  Commonly known as: PROTONIX  Take 1 tablet by mouth daily     valsartan-hydroCHLOROthiazide 160-12.5 MG per tablet  Commonly known as: DIOVAN-HCT  Take 1 tablet by mouth daily     Vitamin D (Cholecalciferol) 25 MCG (1000 UT) Tabs            STOP taking these medications      isosorbide mononitrate 30 MG extended release tablet  Commonly known as: IMDUR               Where to Get Your Medications        These medications were sent to [id number] - [place], [place] - [address] [phone] -  [phone]  [address] [facility] [address]      Phone: [phone]   aspirin 81 MG EC tablet        Objective Findings at Discharge:   /71   Pulse 88   Temp 97.6 °F (36.4 °C) (Oral)   Resp 18   Ht 5' 7\" (1.702 m)   Wt 207 lb (93.9  kg)   SpO2 98%   BMI 32.42 kg/m²       Physical Exam:   General: NAD  Eyes: EOMI  ENT: neck supple  Cardiovascular: Regular rate and rhythm, normal S1/S2, no gallops or murmurs   Respiratory: Clear to auscultation bilaterally, no rhonchi, rales or wheezes   Gastrointestinal: Soft, non tender, BS X 4  Genitourinary: no suprapubic tenderness  Musculoskeletal: No edema  Skin: warm, dry  Neuro: Alert and oriented x 3   Psych: Mood appropriate. Labs and Imaging   XR CHEST PORTABLE    Result Date: 1/9/2023  EXAMINATION: ONE XRAY VIEW OF THE CHEST 1/9/2023 9:19 am COMPARISON: 01/06/2023 HISTORY: ORDERING SYSTEM PROVIDED HISTORY: chest pain TECHNOLOGIST PROVIDED HISTORY: Reason for exam:->chest pain Reason for Exam: chest pain FINDINGS: The lungs are without acute focal process. There is no effusion or pneumothorax. The cardiomediastinal silhouette is stable. The osseous structures are stable. No acute process. XR CHEST PORTABLE    Result Date: 1/6/2023  EXAMINATION: ONE XRAY VIEW OF THE CHEST 1/6/2023 4:50 am COMPARISON: None. HISTORY: ORDERING SYSTEM PROVIDED HISTORY: chest pain TECHNOLOGIST PROVIDED HISTORY: Reason for exam:->chest pain Reason for Exam: chest pain FINDINGS: The heart is normal in size and configuration. The mediastinal contours are within normal limits. The lungs are well aerated. The pleural surfaces are normal and no evidence of a pleural effusion is seen. No acute bone or soft tissue abnormality is noted. No acute cardiopulmonary abnormality. CTA CHEST W CONTRAST    Result Date: 1/6/2023  EXAMINATION: CTA OF THE CHEST 1/6/2023 7:34 am TECHNIQUE: CTA of the chest was performed after the administration of intravenous contrast.  Multiplanar reformatted images are provided for review. MIP images are provided for review.  Automated exposure control, iterative reconstruction, and/or weight based adjustment of the mA/kV was utilized to reduce the radiation dose to as low as reasonably achievable. COMPARISON: Same day chest radiograph. HISTORY: ORDERING SYSTEM PROVIDED HISTORY: acute cp/recent sx TECHNOLOGIST PROVIDED HISTORY: Reason for exam:->acute cp/recent sx Decision Support Exception - unselect if not a suspected or confirmed emergency medical condition->Emergency Medical Condition (MA) Reason for Exam: acute cp/recent sx Additional signs and symptoms: 65ml Isovue 370 FINDINGS: Pulmonary Arteries: Pulmonary trunk is mildly dilated, which can be seen in pulmonary artery hypertension. No evidence of pulmonary embolism within the visualized peripheral arteries. No discrete evidence of right-sided heart strain. Mediastinum: No evidence of mediastinal lymphadenopathy. The heart and pericardium demonstrate no acute abnormality. There is no acute abnormality of the thoracic aorta. Lungs/pleura: The trachea and central bronchi appear patent. No pneumothorax or pleural effusion. Mild dependent atelectasis. Streaky and ground-glass opacities within the peripheral left lower lobe and medial right upper lobe favor subsegmental atelectasis. Upper Abdomen: Limited images of the upper abdomen are without acute abnormality. Soft Tissues/Bones: No acute bone or soft tissue abnormality. No evidence of pulmonary embolism. Left basilar and right upper lobe subsegmental atelectasis. A developing infection could have a similar appearance.      NM MYOCARDIAL SPECT REST EXERCISE OR RX    Result Date: 1/10/2023  Cardiac Perfusion Imaging   Demographics   Patient Name        FABY ANSARI  Date of study         01/10/2023   Date of Birth       1950     Gender                Female   Age                 74 year(s)     Race                     Patient Number      [id number]     Room Number           8044   Visit Number        [id number]      Height                67 inches   Corporate ID        [id number]       Weight                208 pounds   Accession Number    [id number] NM Technologist       [address] [place] [address]   Ordering Physician                 Interpreting          Kylah Vásquez                                     Cardiologist          Marcellus BANKS   Conclusions   Summary  Normal tracer uptake in all segments of myocardium on stress ans rest  images. Normal Lexiscan nuclear scintigraphic study suggestive of normal  myocardial perfusion. Gated images demonstrate normal left ventricular  systolic function with EF of 60 %. Signatures   ------------------------------------------------------------------  Electronically signed by Cathy Chen MD  (Interpreting cardiologist) on 01/10/2023 at 12:48  ------------------------------------------------------------------  Procedure Procedure Type:   Nuclear Stress Test:Pharmacological, Myocardial Perfusion Imaging with  Pharm, NM MYOCARDIAL SPECT REST EXERCISE OR RX  Indications: Chest pain. Risk Factors   The patient risk factors include:obesity, hypercholesterolemia, hypertension  and family history of premature CAD. Stress Protocols   Resting ECG  Normal sinus rhythm. Resting HR:88 bpm  Resting BP:146/92 mmHg  Stress Protocol:Pharmacologic - Lexiscan  Peak HR:125 bpm                              HR/BP product:73819  Peak BP:146/92 mmHg  Predicted HR: 146 bpm  % of predicted HR: 86   Exercise duration: 01:10 min  Reason for termination:Completed   Symptoms  No symptoms with stress. Stress Interpretation  ECG portion of stress test is negative for ischemia by diagnostic criteria. No chest pain with exercise. Procedure Medications   - Lexiscan I.V. bolus 0.4 mg admininstered @ 01/10/2023 09:25. Imaging Protocols   Rest                             Stress   Isotope:Sestamibi 99mTc          Isotope: Sestamibi 99mTc  Isotope dose:10 mCi              Isotope dose:30.3 mCi  Administration route: I.V. Administration route: I.V.   Injection Date:01/10/2023 07:50  Injection Date:01/10/2023 09:25  Scan Date:01/10/2023 08:35       Scan Date:01/10/2023 10:10   Technique:        SPECT          Technique:        Gated                                                     SPECT   Procedure Description   Upon patient arrival, the patient is identified using two identifiers and  the physician order is verified. An IV is established and 8-11mCi of 99mTc  Sestamibi is intravenously injected and followed with 10mL 0.9% Normal  Saline flush. A circulation period of 45 minutes occurs prior to resting  SPECT imaging. After imaging is complete the patient is escorted to the  stress lab. The patient is connected to the ECG and blood pressure is  measured. The RN starts the stress portion of the exam and rapidly  intravenously injects Lexiscan (regadenosine) 0.4mg over a period of 10 to15  seconds and follows with 5mL 0.9% Normal Saline flush. Immediately following  the Nuclear Technologist intravenously injects 22-33mCi of 99mTc Sestamibi  and 5mL 0.9% Normal Saline flush. After completion, recovery, and removal of  the IV, the patient rests during the second circulation period of 45  minutes. Final stress SPECT gated imaging is performed. The patient may  return home or to their room after stress imaging. The images are processed  and final charting is completed and sent to the appropriate cardiologist for  interpretation and reporting. Perfusion Interpretation   Normal tracer uptake in all segments of myocardium on stress ans rest  images. Imaging Results    Summed scores     - Summed stress score: 0     - Summed rest score: 0     - Summed difference score:    0   Rest ejection  Ejection fraction:73 %  EDV :55 ml  ESV :15 ml  Stroke volume :40 ml  Medical History   Accession#:  [id number]  Admission Data Admission date: 01/09/2023 Admission Time: 09:11 Hospital Status: Inpatient.       CBC:   Recent Labs     01/09/23 0922 01/10/23  0331   WBC 7.4 5.6   HGB 13.1 11.3*    190     BMP:    Recent Labs 01/09/23  0922 01/10/23  0331    140   K 3.8 4.1    107   CO2 19* 24   BUN 14 11   CREATININE 0.8 0.9   GLUCOSE 112* 90     Hepatic:   Recent Labs     01/09/23 0922   AST 30   ALT 38   BILITOT 0.5   ALKPHOS 65     Lipids:   Lab Results   Component Value Date/Time    CHOL 131 09/19/2022 08:12 AM    HDL 69 09/19/2022 08:12 AM    TRIG 70 09/19/2022 08:12 AM     Hemoglobin A1C:   Lab Results   Component Value Date/Time    LABA1C 5.2 09/19/2022 08:12 AM     TSH:   Lab Results   Component Value Date/Time    TSH 3.28 09/13/2021 08:00 AM     Troponin:   Lab Results   Component Value Date/Time    TROPONINT <0.010 01/10/2023 11:45 AM    TROPONINT <0.010 01/09/2023 09:22 AM    TROPONINT <0.010 01/06/2023 05:05 AM     Lactic Acid: No results for input(s): LACTA in the last 72 hours.   BNP:   Recent Labs     01/09/23 0922   PROBNP 111.0     UA:No results found for: Santos Rajesh, PHUR, LABCAST, WBCUA, RBCUA, MUCUS, TRICHOMONAS, YEAST, BACTERIA, CLARITYU, SPECGRAV, LEUKOCYTESUR, UROBILINOGEN, BILIRUBINUR, BLOODU, GLUCOSEU, KETUA, AMORPHOUS  Urine Cultures: No results found for: LABURIN  Blood Cultures: No results found for: BC  No results found for: BLOODCULT2  Organism: No results found for: ORG    Time Spent Discharging patient 30 minutes    Electronically signed by PRIYA Estevez CNP on 1/10/2023 at 4:10 PM

## 2023-01-10 NOTE — PROGRESS NOTES
[address]ney [address], Lor[address]INO [address]  Phone: [phone]    Fax [phone]                  Angie Ji MD, Guilherme Mota MD, Emilee Smart MD, Tom Somerset, MD Verdene Art, MD Stormy Schlatter, MD Silvana Brookes, MD Dr. Kristan Husbands MD Leoal Remy, PRIYA Edwards, PRIYA Melo Nurse, PRIYA Zhang, PRIYA Carpenter PA-C    CARDIOLOGY  NOTE      Name:  Veena Alcocer /Age/Sex: 1950  (74 y.o. female)   MRN & CSN:  [id number] & [id number] Admission Date/Time: 2023  9:11 AM   Location:  [facility] PCP: Eduardo Acosta, [address] [facility] Day: 2    - Cardiology consult is for: Chest pain      ASSESSMENT/ PLAN:  Atypical chest pain  -Troponin and EKG unremarkable.  -Stress test and echo unremarkable. -Patient is safe for discharge from cardiology perspective  Hypertension  -Well-controlled. -May resume home medications  Hyperlipidemia  -Continue Lipitor continue Lipitor    Patient to follow-up with Dr. Jada Delatorre as outpatient. Echo: 2023   Left ventricular systolic function is normal.   Ejection fraction is visually estimated at 55-60%. No significant valvular disease noted. No evidence of any pericardial effusion. Pericardial fat pad noted. Stress test: 1/10/2023   Normal tracer uptake in all segments of myocardium on stress ans rest   images. Normal Lexiscan nuclear scintigraphic study suggestive of normal   myocardial perfusion. Gated images demonstrate normal left ventricular   systolic function with EF of 60 %. Subjective:  Nadia Mujica is a 74 y. o.year old     Patient is resting comfortably in bed following her stress test.  No complaints of chest discomfort or shortness of breath at this time. Informed of normal stress test.    Objective: Temperature:  Current - Temp: 97.6 °F (36.4 °C);  Max - Temp  Av.1 °F (36.7 °C) Min: 97.6 °F (36.4 °C)  Max: 98.7 °F (37.1 °C)    Respiratory Rate : Resp  Av.3  Min: 18  Max: 19    Pulse Range: Pulse  Av.5  Min: 69  Max: 88    Blood Presuure Range:  Systolic (05EXX), YMP:230 , Min:115 , FHK:549   ; Diastolic (70PLP), LWR:86, Min:63, Max:86      Pulse ox Range: SpO2  Av.4 %  Min: 96 %  Max: 98 %    24hr I & O:  No intake or output data in the 24 hours ending 01/10/23 1344      /71   Pulse 88   Temp 97.6 °F (36.4 °C) (Oral)   Resp 18   Ht 5' 7\" (1.702 m)   Wt 207 lb (93.9 kg)   SpO2 98%   BMI 32.42 kg/m²         TELEMETRY: Liliane      has a past medical history of Essential hypertension, GERD (gastroesophageal reflux disease), History of cardiac monitoring, Hyperlipidemia, and Hypothyroidism. has a past surgical history that includes Hysterectomy (); Neck surgery (); Knee arthroscopy (Left, ); Lumbar spine surgery ( & ); and Knee cartilage surgery (Left, ). Physical Exam  Constitutional:       General: She is not in acute distress. Appearance: She is not diaphoretic. HENT:      Head: Normocephalic and atraumatic. Right Ear: External ear normal.      Left Ear: External ear normal.      Nose: Nose normal.      Mouth/Throat:      Mouth: Mucous membranes are moist.   Eyes:      Extraocular Movements: Extraocular movements intact. Comments: Pupils equal and round   Neck:      Vascular: No carotid bruit. Cardiovascular:      Rate and Rhythm: Normal rate and regular rhythm. Pulses: Normal pulses. Heart sounds: S1 normal and S2 normal. No murmur heard. No friction rub. No gallop. Pulmonary:      Effort: Pulmonary effort is normal. No respiratory distress. Breath sounds: Normal breath sounds. No rales. Chest:      Chest wall: No tenderness. Abdominal:      Palpations: Abdomen is soft. Tenderness: There is no abdominal tenderness. Musculoskeletal:      Right lower leg: No edema.       Left lower leg: No edema.   Skin:     General: Skin is warm and dry. Capillary Refill: Capillary refill takes less than 2 seconds. Findings: No rash. Comments: Skin turgor brisk   Neurological:      Mental Status: She is alert and oriented to person, place, and time. Mental status is at baseline. Psychiatric:         Behavior: Behavior is cooperative. Thought Content: Thought content normal.         Judgment: Judgment normal.        Medications:    atorvastatin  40 mg Oral Daily    [Held by provider] isosorbide mononitrate  30 mg Oral Daily    levothyroxine  50 mcg Oral Daily    pantoprazole  40 mg Oral Daily    [Held by provider] valsartan-hydroCHLOROthiazide  1 tablet Oral Daily    sodium chloride flush  5-40 mL IntraVENous 2 times per day    enoxaparin  40 mg SubCUTAneous Daily    aspirin  81 mg Oral Daily      sodium chloride       sodium chloride flush, sodium chloride, ondansetron **OR** ondansetron, polyethylene glycol, acetaminophen **OR** acetaminophen    Lab Data:  CBC:   Recent Labs     01/09/23  0922 01/10/23  0331   WBC 7.4 5.6   HGB 13.1 11.3*   HCT 38.1 34.9*   MCV 94.3 99.1    190     BMP:   Recent Labs     01/09/23  0922 01/10/23  0331    140   K 3.8 4.1    107   CO2 19* 24   BUN 14 11   CREATININE 0.8 0.9     LIVER PROFILE:   Recent Labs     01/09/23 0922   AST 30   ALT 38   BILITOT 0.5   ALKPHOS 65     PT/INR: No results for input(s): PROTIME, INR in the last 72 hours. APTT: No results for input(s): APTT in the last 72 hours. BNP:  No results for input(s): BNP in the last 72 hours. TROPONIN:   Recent Labs     01/09/23  0922 01/10/23  1145   TROPONINT <0.010 <0.010     Labs, consult, tests reviewed          Bennie Padilla PA-C, 1/10/2023 1:44 PM     I have seen ,spoken to  and examined this patient personally, independently of the ANGELIA. I have reviewed the hospital care given to date and reviewed all pertinent labs and imaging. I have spoken with patient, nursing staff and provided written and verbal instructions . The above note has been reviewed     CARDIOLOGY ATTENDING ADDENDUM    HPI:  I have reviewed the above HPI  And agree with above     Pulse Range: Pulse  Av.3  Min: 69  Max: 88    Blood Presuure Range:  Systolic (56JWP), PGN:413 , Min:115 , DAP:294   ; Diastolic (86GWP), IFL:27, Min:65, Max:86      Pulse ox Range: SpO2  Av.5 %  Min: 96 %  Max: 98 %    24hr I & O:  No intake or output data in the 24 hours ending 01/10/23 1531      /71   Pulse 88   Temp 97.6 °F (36.4 °C) (Oral)   Resp 18   Ht 5' 7\" (1.702 m)   Wt 207 lb (93.9 kg)   SpO2 98%   BMI 32.42 kg/m²       Physical Exam:  General:  Awake, alert, NAD  Head:normal  Eye:normal  Neck:  No JVD   Chest:  Clear to auscultation, respiration easy  Cardiovascular:  RRR S1S2  Abdomen:   nontender  Extremities:  tr edema  Pulses; palpable  Neuro: grossly normal      MEDICAL DECISION MAKING;    Pt assessed , chart reviewed, patient examined examined , all available data was reviewed, following is the plan which was discussed with ANGELIA as well:    -Chest pain we will check serial troponins and EKGs patient's Cardiolite and echo unremarkable hence can be discharged to be followed up as an outpatient  -Hyperlipidemia on Lipitor which will be continued  -Hypothyroidism on Synthroid which will be continued  -On nitrates for chest pain which will be continued  -Hypertension on Diovan blood pressure well controlled             Beena Chinchilla MD [facility]

## 2023-01-11 ENCOUNTER — CARE COORDINATION (OUTPATIENT)
Dept: CASE MANAGEMENT | Age: 73
End: 2023-01-11

## 2023-01-11 DIAGNOSIS — R07.9 CHEST PAIN, UNSPECIFIED TYPE: Primary | ICD-10-CM

## 2023-01-11 PROCEDURE — 1111F DSCHRG MED/CURRENT MED MERGE: CPT

## 2023-01-12 NOTE — CARE COORDINATION
[facility] Care Transitions Initial Follow Up Call    Call within 2 business days of discharge: Yes    Care Transition Nurse contacted the patient by telephone to perform post hospital discharge assessment. Verified name and  with patient as identifiers. Provided introduction to self, and explanation of the Care Transition Nurse role. Patient: Stas Record Patient : 1950   MRN: [id number]  Reason for Admission: Chest Pain  Discharge Date: 1/10/23 RARS: No data recorded  Facility: [facility] 23-1/10/23  Last Discharge [address]       Date Complaint Diagnosis Description Type Department Provider    23 Chest Pain Chest pain, unspecified type . .. ED to Hosp-Admission (Discharged) (ADMITTED) Ro Coon MD; Kenyatta Guadarrama. .. Was this an external facility discharge? No   Challenges to be reviewed by the provider   Additional needs identified to be addressed with provider: No       Method of communication with provider: none. Patient reports doing very well since home. States \"better today, feel just fine\". Reports 101/70 this morning w/ baseline . Denies cp, dizziness, sob, palps, ac distress. Reviewed sx that require immediate MD notification. Fall safety reviewed. Denies c/o, needs. Reports appetite, fluid intake good w/ b&b wnl. Advised heart  healthy diet. Declined RD referral.     Care Transition Nurse reviewed discharge instructions, medical action plan, and red flags with patient who verbalized understanding. The patient was given an opportunity to ask questions and does not have any further questions or concerns at this time. Were discharge instructions available to patient? Yes. Reviewed appropriate site of care based on symptoms and resources available to patient including: PCP  Specialist  Urgent care clinics  When to call 911. The patient agrees to contact PCP office for questions related to healthcare.      Resource Need Assessment:   Living Arrangements: lives w/ spouse   ADLS:independent, drives   DME:walker prn  Transportation: self  HHC:none, denies need   Community Assistance:none, denies need   Referrals Made per CTN with Patient/Family Approval: n/a      Advance Care Planning:   Does patient have an Advance Directive: reviewed and current. Healthcare Decision Maker:    Primary Decision Maker: Stefanie Myrna - [phone]    Secondary Decision Maker: Nicanor Taylor - [phone]    Medication reconciliation was performed with patient, who verbalizes understanding of administration of home medications. Medications reviewed, 1111F entered: yes    Was patient discharged with a pulse oximeter? no    Non-face-to-face services provided:  Obtained and reviewed discharge summary and/or continuity of care documents    Offered patient enrollment in the Remote Patient Monitoring (RPM) program for in-home monitoring: Patient declined. Care Transitions 24 Hour Call    Schedule Follow Up Appointment with PCP: Ela Franklin you have a copy of your discharge instructions?: Yes  Do you have all of your prescriptions and are they filled?: Yes  Have you been contacted by a [facility] Pharmacist?: No  Have you scheduled your follow up appointment?: No  Do you feel like you have everything you need to keep you well at home?: Yes  Care Transitions Interventions   Home Care Waiver: Declined       Senior Services: Declined             Transportation confirmed:  Future Appointments   Date Time Provider Kori Sultana   1/16/2023  9:00 AM Roque Bridges MD [facility]   3/21/2023  9:00 AM Vilma David APRN - CNP [address] [place] [facility]   8/9/2023  3:00 PM [address] [place] [facility] AWV LPN [address] [facility]       Care Transition Nurse provided contact information. Plan for follow-up call in 5-7 days based on severity of symptoms and risk factors.   Plan for next call: symptom management-cp?  self management-bp?  follow-up appointment-f/u on 1/16/23 cardiology appt  referrals-RPM: hu Sellers RN

## 2023-01-16 ENCOUNTER — OFFICE VISIT (OUTPATIENT)
Dept: CARDIOLOGY CLINIC | Age: 73
End: 2023-01-16
Payer: MEDICARE

## 2023-01-16 VITALS
SYSTOLIC BLOOD PRESSURE: 124 MMHG | HEART RATE: 80 BPM | BODY MASS INDEX: 32.65 KG/M2 | DIASTOLIC BLOOD PRESSURE: 68 MMHG | HEIGHT: 67 IN | WEIGHT: 208 LBS

## 2023-01-16 DIAGNOSIS — I20.0 UNSTABLE ANGINA (HCC): Primary | ICD-10-CM

## 2023-01-16 PROCEDURE — 3074F SYST BP LT 130 MM HG: CPT | Performed by: INTERNAL MEDICINE

## 2023-01-16 PROCEDURE — G8400 PT W/DXA NO RESULTS DOC: HCPCS | Performed by: INTERNAL MEDICINE

## 2023-01-16 PROCEDURE — 99214 OFFICE O/P EST MOD 30 MIN: CPT | Performed by: INTERNAL MEDICINE

## 2023-01-16 PROCEDURE — G8484 FLU IMMUNIZE NO ADMIN: HCPCS | Performed by: INTERNAL MEDICINE

## 2023-01-16 PROCEDURE — G9899 SCRN MAM PERF RSLTS DOC: HCPCS | Performed by: INTERNAL MEDICINE

## 2023-01-16 PROCEDURE — 3017F COLORECTAL CA SCREEN DOC REV: CPT | Performed by: INTERNAL MEDICINE

## 2023-01-16 PROCEDURE — 1090F PRES/ABSN URINE INCON ASSESS: CPT | Performed by: INTERNAL MEDICINE

## 2023-01-16 PROCEDURE — 3078F DIAST BP <80 MM HG: CPT | Performed by: INTERNAL MEDICINE

## 2023-01-16 PROCEDURE — G8427 DOCREV CUR MEDS BY ELIG CLIN: HCPCS | Performed by: INTERNAL MEDICINE

## 2023-01-16 PROCEDURE — 1036F TOBACCO NON-USER: CPT | Performed by: INTERNAL MEDICINE

## 2023-01-16 PROCEDURE — 1123F ACP DISCUSS/DSCN MKR DOCD: CPT | Performed by: INTERNAL MEDICINE

## 2023-01-16 PROCEDURE — G8417 CALC BMI ABV UP PARAM F/U: HCPCS | Performed by: INTERNAL MEDICINE

## 2023-01-16 RX ORDER — ISOSORBIDE MONONITRATE 30 MG/1
30 TABLET, EXTENDED RELEASE ORAL DAILY
COMMUNITY

## 2023-01-16 NOTE — PROGRESS NOTES
Vein \"LEG PROBLEM Questionnaire\"  Do you have prominent leg veins? Yes   Do you have any skin discoloration? No  Do you have any healed or active sores? Yes  Do you have swelling of the legs? No  Do you have a family history of varicose veins? 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 Yes  7. Have you been fighting overweight problems? Yes  8. Do you have restless legs? No  9. Do you have any night time cramps? Yes  10. Do you have any of the following in your legs:         I  11. If Yes - Have they worn compression stockings No  12.  If they have worn compression stockings

## 2023-01-16 NOTE — PATIENT INSTRUCTIONS
Please be informed that if you contact our office outside of normal business hours the physician on call cannot help with any scheduling or rescheduling issues, procedure instruction questions or any type of medication issue. We advise you for any urgent/emergency that you go to the nearest emergency room! PLEASE CALL OUR OFFICE DURING NORMAL BUSINESS HOURS    Monday - Friday   8 am to 5 pm    [place]: [phone]    [place]:  [phone]  **It is YOUR responsibilty to bring medication bottles and/or updated medication list to [address] [place] [address]. This will allow us to better serve you and all your healthcare needs**  [facility] Locations - No appointment necessary. Sites open Monday to Friday. Call your preferred location for test preparation, business hours and other information you need. [employer] accepts [employer]. [facility] [address] [place], [address] [facility]  Phone: [phone] [facility] [address] [facility]  [address]. Rosemary reid, [address] [facility]  Phone: [phone]     Thank you for allowing us to care for you today! We want to ensure we can follow your treatment plan and we strive to give you the best outcomes and experience possible. If you ever have a life threatening emergency and call 911 - for an ambulance (EMS)   Our providers can only care for you at:   [facility] or [facility]. Even if you have someone take you or you drive yourself we can only care for you in a [facility] facility. Our providers are not setup at the other healthcare locations!

## 2023-01-16 NOTE — PROGRESS NOTES
Yaima Jose MD        OFFICE  FOLLOWUP NOTE    Chief complaints: patient is here for management of chest pain, HTN, DYSLPIDEMIA    Subjective: +  chest pain, no shortness of breath, no dizziness, no palpitations    HPI Nory Lehman is a 74 y. o.year old who  has a past medical history of Essential hypertension, GERD (gastroesophageal reflux disease), History of cardiac monitoring, Hyperlipidemia, and Hypothyroidism. and presents for management of chest pain, HTN, DYSLPIDEMIA which are well controlled      Current Outpatient Medications   Medication Sig Dispense Refill    isosorbide mononitrate (IMDUR) 30 MG extended release tablet Take 30 mg by mouth daily      aspirin 81 MG EC tablet Take 1 tablet by mouth daily 30 tablet 3    Vitamin D, Cholecalciferol, 25 MCG (1000 UT) TABS Take 1,000 Units by mouth daily      GLUCOSAMINE-CHONDROITIN PO Take 1 capsule by mouth 2 times daily      Calcium Carbonate (CALCIUM 600 PO) Take 1 tablet by mouth 2 times daily      Omega-3 Fatty Acids (FISH OIL) 1200 MG CAPS Take 1 capsule by mouth daily      atorvastatin (LIPITOR) 40 MG tablet Take 1 tablet by mouth daily 90 tablet 1    levothyroxine (SYNTHROID) 50 MCG tablet Take 1 tablet by mouth daily 90 tablet 1    pantoprazole (PROTONIX) 40 MG tablet Take 1 tablet by mouth daily 90 tablet 1    valsartan-hydroCHLOROthiazide (DIOVAN-HCT) 160-12.5 MG per tablet Take 1 tablet by mouth daily 90 tablet 1    meclizine (ANTIVERT) 25 MG tablet Take 25 mg by mouth 3 times daily as needed       No current facility-administered medications for this visit.      Allergies: Codeine and Morphine  Past Medical History:   Diagnosis Date    Essential hypertension 07/20/2019    GERD (gastroesophageal reflux disease) 07/20/2019    History of cardiac monitoring     Hyperlipidemia 07/20/2019    Hypothyroidism 07/20/2019     Past Surgical History:   Procedure Laterality Date    HYSTERECTOMY (CERVIX STATUS UNKNOWN)  1970    KNEE ARTHROSCOPY Left 2013    KNEE CARTILAGE SURGERY Left 2022    LUMBAR SPINE SURGERY  2009 & 2014    Herniated disc repair    NECK SURGERY  2004    cervical disc repair     Family History   Problem Relation Age of Onset    Other Mother         unknown cardiac    Other Father         unknown cardiac    Heart Disease Sister     Pacemaker Sister      Social History     Tobacco Use    Smoking status: Never    Smokeless tobacco: Never   Substance Use Topics    Alcohol use: Never     Comment: Caffiene - 2 cups of coffee/day      [unfilled]  Review of Systems:   Constitutional: No Fever or Weight Loss   Eyes: No Decreased Vision  ENT: No Headaches, Hearing Loss or Vertigo  Cardiovascular:+ chest pain, dyspnea on exertion, palpitations or loss of consciousness  Respiratory: No cough or wheezing    Gastrointestinal: No abdominal pain, appetite loss, blood in stools, constipation, diarrhea or heartburn  Genitourinary: No dysuria, trouble voiding, or hematuria  Musculoskeletal:  No gait disturbance, weakness or joint complaints  Integumentary: No rash or pruritis  Neurological: No TIA or stroke symptoms  Psychiatric: No anxiety or depression  Endocrine: No malaise, fatigue or temperature intolerance  Hematologic/Lymphatic: No bleeding problems, blood clots or swollen lymph nodes  Allergic/Immunologic: No nasal congestion or hives  All systems negative except as marked. Objective:  /68 (Site: Left Upper Arm, Position: Sitting, Cuff Size: Large Adult)   Pulse 80   Ht 5' 7\" (1.702 m)   Wt 208 lb (94.3 kg)   BMI 32.58 kg/m²   Wt Readings from Last 3 Encounters:   01/16/23 208 lb (94.3 kg)   01/09/23 207 lb (93.9 kg)   01/06/23 208 lb (94.3 kg)     Body mass index is 32.58 kg/m². GENERAL - Alert, oriented, pleasant, in no apparent distress,normal grooming  HEENT - pupils are intact, cornea intact, conjunctive normal color, ears are normal in exam,  Neck - Supple. No jugular venous distention noted.  No carotid bruits, no apical -carotid delay  Respiratory:  Normal breath sounds, No respiratory distress, No wheezing, No chest tenderness. ,no use of accessory muscles, diaphragm movement is normal  Cardiovascular: (PMI) apex non displaced,no lifts no thrills, no s3,no s4, Normal heart rate, Normal rhythm, No murmurs, No rubs, No gallops. Carotid arteries pulse and amplitude are normal no bruit, no abdominal bruit noted ( normal abdominal aorta ausculation),   Extremities - No cyanosis, clubbing, or significant edema, no varicose veins    Abdomen - No masses, tenderness, or organomegaly, no hepato-splenomegally, no bruits  Musculoskeletal - No significant edema, no kyphosis or scoliosis, no deformity in any extremity noted, muscle strength and tone are normal  Skin: no ulcer,no scar,no stasis dermatitis   Neurologic - alert oriented times 3,Cranial nerves II through XII are grossly intact. There were no gross focal neurologic abnormalities. Psychiatric: normal mood and affect    Lab Results   Component Value Date/Time    CKTOTAL 53 01/09/2023 09:22 AM     BNP:  No results found for: BNP  PT/INR:  No results found for: PTINR  Lab Results   Component Value Date    LABA1C 5.2 09/19/2022    LABA1C 5.4 03/16/2022     Lab Results   Component Value Date    CHOL 131 09/19/2022    TRIG 70 09/19/2022    HDL 69 (H) 09/19/2022    LDLCALC 48 09/19/2022     Lab Results   Component Value Date    ALT 38 01/09/2023    AST 30 01/09/2023     TSH:    Lab Results   Component Value Date/Time    TSH 3.28 09/13/2021 08:00 AM       Impression:  Lisandro Chow is a 74 y. o.year old who  has a past medical history of Essential hypertension, GERD (gastroesophageal reflux disease), History of cardiac monitoring, Hyperlipidemia, and Hypothyroidism.  and presents with     Plan:  Chest pain: DISCHARGED from hospotal, had normal stress test and echo in hosptial, CECILE HAS CHEST PAIN LIKE ELEPHANT SITTING ON THE CHEST, PATIENT is requesting [facility] scheduled,risk and benefit explained and consent obtained   3 days holter monitor pending  HTN: stable,c ontinue valsartan/hctz,  Dyslipidemia: stable, continue statins  Health maintenance: exerise and diet  All labs, medications and tests reviewed, continue all other medications of all above medical condition listed as is.     [unfilled]

## 2023-01-18 ENCOUNTER — CARE COORDINATION (OUTPATIENT)
Dept: CASE MANAGEMENT | Age: 73
End: 2023-01-18

## 2023-01-18 NOTE — CARE COORDINATION
[facility] Care Transitions Follow Up Call    Care Transition Nurse contacted the patient by telephone to follow up after admission on 23-1/10/23. Verified name and  with patient as identifiers. Patient: Gladis Engel  Patient : 1950   MRN: [id number]  Reason for Admission: Chest Pain  Discharge Date: 1/10/23 RARS: No data recorded  [facility]      Needs to be reviewed by the provider   Additional needs identified to be addressed with provider: No     Method of communication with provider: none as MD aware and addressing    Patient reports doing well. Reports \"little pressure yesterday right over my heart\". Dr Tonja Parra was notified and has ordered [address] for [address]. Patient denies ac chest pain/pressure, palps, dizziness, sob, ac distress. Noted to be speaking in complete, unlabored sentences. Holter monitor results still pending. Patient is non-smoker. Reports /73, HR 84. Advised heart healthy diet, managing stress. Transportation confirmed:  Future Appointments   Date Time Provider Kori Sultana   2023  9:00 AM SCHEDULE, [facility] CARDIO SPR [facility]   2023  1:00 PM CATH LAB 04 [address] [facility]   2023  2:10 PM Alirio Hill MD [facility]   3/21/2023  9:00 AM Beulah eHlms APRN - CNP [address] [facility] My[facility]   2023  3:00 PM [address] [facility] [place] [facility] LPN [address] [facility]     Care Transition Nurse reviewed medical action plan and red flags with patient and discussed any barriers to care and/or understanding of plan of care after discharge. Discussed appropriate site of care based on symptoms and resources available to patient including: PCP  Specialist  Urgent care clinics. The patient agrees to contact PCP office for questions related to  healthcare.      Patients top risk factors for readmission: medical condition-on going sx  Interventions to address risk factors:  MD aware and addressing as above    Offered patient enrollment in the Remote Patient Monitoring (RPM) program for in-home monitoring: Patient declined. Care Transitions Subsequent and Final Call    Schedule Follow Up Appointment with PCP: Declined  Subsequent and Final Calls  Do you have any ongoing symptoms?: Yes  Onset of Patient-reported symptoms: Other  Patient-reported symptoms: Chest Pain  Interventions for patient-reported symptoms: Other  Have your medications changed?: No  Do you have any questions related to your medications?: No  Do you currently have any active services?: No  Do you have any needs or concerns that I can assist you with?: No  Identified Barriers: Other  Care Transitions Interventions   Home Care Waiver: Declined        Transportation Support: Declined    Meals on Wheels: Declined  DME Assistance: Declined     Senior Services: Declined    Other Interventions:             Care Transition Nurse provided contact information for future needs. Plan for follow-up call in 5-7 days based on severity of symptoms and risk factors. Plan for next call: symptom management-chest pressure?  self management-bp/hr?  follow-up appointment-1/24/23 [facility]  medication management-any rx needs?   referrals-RPM- declined  ACM- declined    Silvia Arroyo RN

## 2023-01-19 ENCOUNTER — NURSE ONLY (OUTPATIENT)
Dept: CARDIOLOGY CLINIC | Age: 73
End: 2023-01-19

## 2023-01-19 ENCOUNTER — HOSPITAL ENCOUNTER (OUTPATIENT)
Age: 73
Discharge: HOME | End: 2023-01-19
Payer: MEDICARE

## 2023-01-19 LAB
ABO/RH: NORMAL
ANTIBODY SCREEN: NEGATIVE
COMMENT: NORMAL

## 2023-01-19 PROCEDURE — 86900 BLOOD TYPING SEROLOGIC ABO: CPT

## 2023-01-19 PROCEDURE — 86850 RBC ANTIBODY SCREEN: CPT

## 2023-01-19 PROCEDURE — 36415 COLL VENOUS BLD VENIPUNCTURE: CPT

## 2023-01-19 PROCEDURE — 86901 BLOOD TYPING SEROLOGIC RH(D): CPT

## 2023-01-19 NOTE — PROGRESS NOTES
Patient was here in office & educated on [facility] for Dx: CP.  Procedure is scheduled for 1/24/23 @ 1300, w/arrival @ 11 AM, @ [facility]. Pre-admission orders were given to patient for labs which are due 1/19/23 @ [address] [facility]. Procedure and risks were explained to patient. Consent forms were signed. Instructions were given to patient to remain NPO after midnight the night before procedure. Patient may take morning meds the morning of procedure with small amount of water. Patient was notified that procedure could be delayed due to an emergency. Patient voiced understanding.  Copies of consent, pre-testing orders, & instructions scanned into media

## 2023-01-23 ENCOUNTER — TELEPHONE (OUTPATIENT)
Dept: CARDIOLOGY CLINIC | Age: 73
End: 2023-01-23

## 2023-01-24 ENCOUNTER — HOSPITAL ENCOUNTER (OUTPATIENT)
Dept: CARDIAC CATH/INVASIVE PROCEDURES | Age: 73
Discharge: HOME | End: 2023-01-24
Attending: INTERNAL MEDICINE | Admitting: INTERNAL MEDICINE
Payer: MEDICARE

## 2023-01-24 VITALS
OXYGEN SATURATION: 99 % | HEART RATE: 80 BPM | BODY MASS INDEX: 32.65 KG/M2 | RESPIRATION RATE: 17 BRPM | HEIGHT: 67 IN | TEMPERATURE: 97.1 F | DIASTOLIC BLOOD PRESSURE: 84 MMHG | SYSTOLIC BLOOD PRESSURE: 132 MMHG | WEIGHT: 208 LBS

## 2023-01-24 PROCEDURE — 93567 NJX CAR CTH SPRVLV AORTGRPHY: CPT

## 2023-01-24 PROCEDURE — C1769 GUIDE WIRE: HCPCS

## 2023-01-24 PROCEDURE — 6370000000 HC RX 637 (ALT 250 FOR IP): Performed by: INTERNAL MEDICINE

## 2023-01-24 PROCEDURE — 2709999900 HC NON-CHARGEABLE SUPPLY

## 2023-01-24 PROCEDURE — C1894 INTRO/SHEATH, NON-LASER: HCPCS

## 2023-01-24 PROCEDURE — 93454 CORONARY ARTERY ANGIO S&I: CPT

## 2023-01-24 PROCEDURE — 6360000002 HC RX W HCPCS

## 2023-01-24 PROCEDURE — 6360000004 HC RX CONTRAST MEDICATION

## 2023-01-24 PROCEDURE — 2580000003 HC RX 258: Performed by: INTERNAL MEDICINE

## 2023-01-24 RX ORDER — SODIUM CHLORIDE 9 MG/ML
INJECTION, SOLUTION INTRAVENOUS CONTINUOUS
Status: DISCONTINUED | OUTPATIENT
Start: 2023-01-24 | End: 2023-01-24 | Stop reason: HOSPADM

## 2023-01-24 RX ORDER — DIAZEPAM 5 MG/1
5 TABLET ORAL ONCE
Status: COMPLETED | OUTPATIENT
Start: 2023-01-24 | End: 2023-01-24

## 2023-01-24 RX ORDER — DIPHENHYDRAMINE HCL 25 MG
25 TABLET ORAL ONCE
Status: COMPLETED | OUTPATIENT
Start: 2023-01-24 | End: 2023-01-24

## 2023-01-24 RX ADMIN — DIAZEPAM 5 MG: 5 TABLET ORAL at 11:28

## 2023-01-24 RX ADMIN — DIPHENHYDRAMINE HYDROCHLORIDE 25 MG: 25 TABLET ORAL at 11:28

## 2023-01-24 RX ADMIN — SODIUM CHLORIDE: 9 INJECTION, SOLUTION INTRAVENOUS at 11:28

## 2023-01-24 NOTE — FLOWSHEET NOTE
Pt to pt  in wheelchair per volunteer for d/c home in private vehicle with spouse.  Right radial site free of bleeding/hematoma at time of d/c

## 2023-01-24 NOTE — H&P
Chief complaints: patient is here for management of chest pain, HTN, DYSLPIDEMIA     Subjective: +  chest pain, no shortness of breath, no dizziness, no palpitations     HPI Fabian Salomon is a 74 y. o.year old who  has a past medical history of Essential hypertension, GERD (gastroesophageal reflux disease), History of cardiac monitoring, Hyperlipidemia, and Hypothyroidism. and presents for management of chest pain, HTN, DYSLPIDEMIA which are well controlled        Current Facility-Administered Medications          Current Outpatient Medications   Medication Sig Dispense Refill    isosorbide mononitrate (IMDUR) 30 MG extended release tablet Take 30 mg by mouth daily        aspirin 81 MG EC tablet Take 1 tablet by mouth daily 30 tablet 3    Vitamin D, Cholecalciferol, 25 MCG (1000 UT) TABS Take 1,000 Units by mouth daily        GLUCOSAMINE-CHONDROITIN PO Take 1 capsule by mouth 2 times daily        Calcium Carbonate (CALCIUM 600 PO) Take 1 tablet by mouth 2 times daily        Omega-3 Fatty Acids (FISH OIL) 1200 MG CAPS Take 1 capsule by mouth daily        atorvastatin (LIPITOR) 40 MG tablet Take 1 tablet by mouth daily 90 tablet 1    levothyroxine (SYNTHROID) 50 MCG tablet Take 1 tablet by mouth daily 90 tablet 1    pantoprazole (PROTONIX) 40 MG tablet Take 1 tablet by mouth daily 90 tablet 1    valsartan-hydroCHLOROthiazide (DIOVAN-HCT) 160-12.5 MG per tablet Take 1 tablet by mouth daily 90 tablet 1    meclizine (ANTIVERT) 25 MG tablet Take 25 mg by mouth 3 times daily as needed          No current facility-administered medications for this visit.          Allergies: Codeine and Morphine  Past Medical History        Past Medical History:   Diagnosis Date    Essential hypertension 07/20/2019    GERD (gastroesophageal reflux disease) 07/20/2019    History of cardiac monitoring      Hyperlipidemia 07/20/2019    Hypothyroidism 07/20/2019         Past Surgical History         Past Surgical History:   Procedure Laterality Date HYSTERECTOMY (CERVIX STATUS UNKNOWN)   1970    KNEE ARTHROSCOPY Left 2013    KNEE CARTILAGE SURGERY Left 2022    LUMBAR SPINE SURGERY   2009 & 2014     Herniated disc repair    NECK SURGERY   2004     cervical disc repair         Family History         Family History   Problem Relation Age of Onset    Other Mother           unknown cardiac    Other Father           unknown cardiac    Heart Disease Sister      Pacemaker Sister           Social History            Tobacco Use    Smoking status: Never    Smokeless tobacco: Never   Substance Use Topics    Alcohol use: Never       Comment: Caffiene - 2 cups of coffee/day      [unfilled]  Review of Systems:   Constitutional: No Fever or Weight Loss   Eyes: No Decreased Vision  ENT: No Headaches, Hearing Loss or Vertigo  Cardiovascular:+ chest pain, dyspnea on exertion, palpitations or loss of consciousness  Respiratory: No cough or wheezing    Gastrointestinal: No abdominal pain, appetite loss, blood in stools, constipation, diarrhea or heartburn  Genitourinary: No dysuria, trouble voiding, or hematuria  Musculoskeletal:  No gait disturbance, weakness or joint complaints  Integumentary: No rash or pruritis  Neurological: No TIA or stroke symptoms  Psychiatric: No anxiety or depression  Endocrine: No malaise, fatigue or temperature intolerance  Hematologic/Lymphatic: No bleeding problems, blood clots or swollen lymph nodes  Allergic/Immunologic: No nasal congestion or hives  All systems negative except as marked. Objective:  /68 (Site: Left Upper Arm, Position: Sitting, Cuff Size: Large Adult)   Pulse 80   Ht 5' 7\" (1.702 m)   Wt 208 lb (94.3 kg)   BMI 32.58 kg/m²       Wt Readings from Last 3 Encounters:   01/16/23 208 lb (94.3 kg)   01/09/23 207 lb (93.9 kg)   01/06/23 208 lb (94.3 kg)      Body mass index is 32.58 kg/m².   GENERAL - Alert, oriented, pleasant, in no apparent distress,normal grooming  HEENT - pupils are intact, cornea intact, conjunctive normal color, ears are normal in exam,  Neck - Supple. No jugular venous distention noted. No carotid bruits, no apical -carotid delay  Respiratory:  Normal breath sounds, No respiratory distress, No wheezing, No chest tenderness. ,no use of accessory muscles, diaphragm movement is normal  Cardiovascular: (PMI) apex non displaced,no lifts no thrills, no s3,no s4, Normal heart rate, Normal rhythm, No murmurs, No rubs, No gallops. Carotid arteries pulse and amplitude are normal no bruit, no abdominal bruit noted ( normal abdominal aorta ausculation),   Extremities - No cyanosis, clubbing, or significant edema, no varicose veins    Abdomen - No masses, tenderness, or organomegaly, no hepato-splenomegally, no bruits  Musculoskeletal - No significant edema, no kyphosis or scoliosis, no deformity in any extremity noted, muscle strength and tone are normal  Skin: no ulcer,no scar,no stasis dermatitis   Neurologic - alert oriented times 3,Cranial nerves II through XII are grossly intact. There were no gross focal neurologic abnormalities. Psychiatric: normal mood and affect           Lab Results   Component Value Date/Time     CKTOTAL 53 01/09/2023 09:22 AM      BNP:  No results found for: BNP  PT/INR:  No results found for: PTINR        Lab Results   Component Value Date     LABA1C 5.2 09/19/2022     LABA1C 5.4 03/16/2022            Lab Results   Component Value Date     CHOL 131 09/19/2022     TRIG 70 09/19/2022     HDL 69 (H) 09/19/2022     LDLCALC 48 09/19/2022            Lab Results   Component Value Date     ALT 38 01/09/2023     AST 30 01/09/2023      TSH:          Lab Results   Component Value Date/Time     TSH 3.28 09/13/2021 08:00 AM         Impression:  Hema Bhat is a 74 y. o.year old who  has a past medical history of Essential hypertension, GERD (gastroesophageal reflux disease), History of cardiac monitoring, Hyperlipidemia, and Hypothyroidism.  and presents with      Plan:  Chest pain: DISCHARGED from hospotal, had normal stress test and echo in hosptial, CECILE HAS CHEST PAIN LIKE ELEPHANT SITTING ON THE CHEST, PATIENT is requesting [facility] scheduled,risk and benefit explained and consent obtained   3 days holter monitor pending  HTN: stable,c ontinue valsartan/hctz,  Dyslipidemia: stable, continue statins  Health maintenance: exerise and diet  All labs, medications and tests reviewed, continue all other medications of all above medical condition listed as is.

## 2023-01-25 ENCOUNTER — CARE COORDINATION (OUTPATIENT)
Dept: CASE MANAGEMENT | Age: 73
End: 2023-01-25

## 2023-01-30 RX ORDER — ISOSORBIDE MONONITRATE 30 MG/1
30 TABLET, EXTENDED RELEASE ORAL DAILY
Qty: 90 TABLET | Refills: 3 | Status: SHIPPED | OUTPATIENT
Start: 2023-01-30

## 2023-02-01 ENCOUNTER — CARE COORDINATION (OUTPATIENT)
Dept: CASE MANAGEMENT | Age: 73
End: 2023-02-01

## 2023-02-01 NOTE — CARE COORDINATION
Care Transitions Outreach Attempt    Attempted to reach patient for transitions of care follow up. Unable to reach patient.     Patient: Ganesh Mendoza Patient : 1950 MRN: [id number]  Reason for Admission: Chest Pain  Discharge Date: 1/10/23       RARS: No data recorded  Facility: [facility]    Last Discharge  [address]       Date Complaint Diagnosis Description Type Department Provider    23   Admission (Discharged) from Abril Arreguin MD          Noted following upcoming appointments from discharge chart review:   [facility] follow up appointment(s):   Future Appointments   Date Time Provider Kori Sultana   2023  2:10 PM Dalton Ken MD [facility]   3/21/2023  9:00 AM PRIYA Ramirez - CNP [facility]   2023  3:00 PM [facility] [place], [phone]

## 2023-02-07 ENCOUNTER — CARE COORDINATION (OUTPATIENT)
Dept: CASE MANAGEMENT | Age: 73
End: 2023-02-07

## 2023-02-07 ENCOUNTER — OFFICE VISIT (OUTPATIENT)
Dept: CARDIOLOGY CLINIC | Age: 73
End: 2023-02-07
Payer: MEDICARE

## 2023-02-07 VITALS
HEART RATE: 78 BPM | BODY MASS INDEX: 33.27 KG/M2 | SYSTOLIC BLOOD PRESSURE: 118 MMHG | DIASTOLIC BLOOD PRESSURE: 64 MMHG | HEIGHT: 67 IN | WEIGHT: 212 LBS

## 2023-02-07 DIAGNOSIS — I20.0 UNSTABLE ANGINA (HCC): Primary | ICD-10-CM

## 2023-02-07 PROCEDURE — G8417 CALC BMI ABV UP PARAM F/U: HCPCS | Performed by: INTERNAL MEDICINE

## 2023-02-07 PROCEDURE — 1123F ACP DISCUSS/DSCN MKR DOCD: CPT | Performed by: INTERNAL MEDICINE

## 2023-02-07 PROCEDURE — G9899 SCRN MAM PERF RSLTS DOC: HCPCS | Performed by: INTERNAL MEDICINE

## 2023-02-07 PROCEDURE — 1090F PRES/ABSN URINE INCON ASSESS: CPT | Performed by: INTERNAL MEDICINE

## 2023-02-07 PROCEDURE — 3078F DIAST BP <80 MM HG: CPT | Performed by: INTERNAL MEDICINE

## 2023-02-07 PROCEDURE — 1036F TOBACCO NON-USER: CPT | Performed by: INTERNAL MEDICINE

## 2023-02-07 PROCEDURE — 3074F SYST BP LT 130 MM HG: CPT | Performed by: INTERNAL MEDICINE

## 2023-02-07 PROCEDURE — G8427 DOCREV CUR MEDS BY ELIG CLIN: HCPCS | Performed by: INTERNAL MEDICINE

## 2023-02-07 PROCEDURE — 99214 OFFICE O/P EST MOD 30 MIN: CPT | Performed by: INTERNAL MEDICINE

## 2023-02-07 PROCEDURE — G8484 FLU IMMUNIZE NO ADMIN: HCPCS | Performed by: INTERNAL MEDICINE

## 2023-02-07 PROCEDURE — 3017F COLORECTAL CA SCREEN DOC REV: CPT | Performed by: INTERNAL MEDICINE

## 2023-02-07 PROCEDURE — G8400 PT W/DXA NO RESULTS DOC: HCPCS | Performed by: INTERNAL MEDICINE

## 2023-02-07 NOTE — CARE COORDINATION
Care Transitions Outreach Attempt      Patient: Shreya Deleon Patient : 1950 MRN: [id number] Reason for Admission: Chest Pain  Discharge Date: 23       RARS: No data recorded  Facility: [facility]    Last Discharge [address]       Date Complaint Diagnosis Description Type Department Provider    23   Admission (Discharged) from Glo Fabian MD          Noted following upcoming appointments from discharge chart review:   [facility] follow up appointment(s):   Future Appointments   Date Time Provider Kori Sultana   3/21/2023  9:00 AM Omid Sibley, APRN - CNP [facility]   2023  3:00 PM [facility]PS AWV LPN [facility]     2nd unsuccessful attempt to reach for Care Transition follow up call. HIPAA compliant message left requesting call back. Episode closed per protocol, no further outreach scheduled.    [address] [place] [address] [phone]

## 2023-02-07 NOTE — PROGRESS NOTES
Belgica Rivera MD        OFFICE  FOLLOWUP NOTE    Chief complaints: patient is here for management of chest pain, HTN, DYSLPIDEMIAchest pain, HTN, DYSLPIDEMIA  Subjective: patient feels better, no chest pain, no shortness of breath, no dizziness, no palpitations    HPI Ena Escobar is a 74 y. o.year old who  has a past medical history of Essential hypertension, GERD (gastroesophageal reflux disease), History of cardiac monitoring, Hyperlipidemia, and Hypothyroidism. and presents for management of chest pain, HTN, DYSLPIDEMIAchest pain, HTN, DYSLPIDEMIAwhich are well controlled      Current Outpatient Medications   Medication Sig Dispense Refill    isosorbide mononitrate (IMDUR) 30 MG extended release tablet Take 1 tablet by mouth daily 90 tablet 3    aspirin 81 MG EC tablet Take 1 tablet by mouth daily 30 tablet 3    Vitamin D, Cholecalciferol, 25 MCG (1000 UT) TABS Take 1,000 Units by mouth daily      GLUCOSAMINE-CHONDROITIN PO Take 1 capsule by mouth 2 times daily      Calcium Carbonate (CALCIUM 600 PO) Take 1 tablet by mouth 2 times daily      Omega-3 Fatty Acids (FISH OIL) 1200 MG CAPS Take 1 capsule by mouth daily      atorvastatin (LIPITOR) 40 MG tablet Take 1 tablet by mouth daily 90 tablet 1    levothyroxine (SYNTHROID) 50 MCG tablet Take 1 tablet by mouth daily 90 tablet 1    pantoprazole (PROTONIX) 40 MG tablet Take 1 tablet by mouth daily 90 tablet 1    valsartan-hydroCHLOROthiazide (DIOVAN-HCT) 160-12.5 MG per tablet Take 1 tablet by mouth daily 90 tablet 1    meclizine (ANTIVERT) 25 MG tablet Take 25 mg by mouth 3 times daily as needed       No current facility-administered medications for this visit.      Allergies: Codeine and Morphine  Past Medical History:   Diagnosis Date    Essential hypertension 07/20/2019    GERD (gastroesophageal reflux disease) 07/20/2019    History of cardiac monitoring     Hyperlipidemia 07/20/2019    Hypothyroidism 07/20/2019     Past Surgical History: Procedure Laterality Date    HYSTERECTOMY (CERVIX STATUS UNKNOWN)  1970    KNEE ARTHROSCOPY Left 2013    KNEE CARTILAGE SURGERY Left 2022    LUMBAR SPINE SURGERY  2009 & 2014    Herniated disc repair    NECK SURGERY  2004    cervical disc repair     Family History   Problem Relation Age of Onset    Other Mother         unknown cardiac    Other Father         unknown cardiac    Heart Disease Sister     Pacemaker Sister      Social History     Tobacco Use    Smoking status: Never    Smokeless tobacco: Never   Substance Use Topics    Alcohol use: Never     Comment: Caffiene - 2 cups of coffee/day      [unfilled]  Review of Systems:   Constitutional: No Fever or Weight Loss   Eyes: No Decreased Vision  ENT: No Headaches, Hearing Loss or Vertigo  Cardiovascular: No chest pain, dyspnea on exertion, palpitations or loss of consciousness  Respiratory: No cough or wheezing    Gastrointestinal: No abdominal pain, appetite loss, blood in stools, constipation, diarrhea or heartburn  Genitourinary: No dysuria, trouble voiding, or hematuria  Musculoskeletal:  No gait disturbance, weakness or joint complaints  Integumentary: No rash or pruritis  Neurological: No TIA or stroke symptoms  Psychiatric: No anxiety or depression  Endocrine: No malaise, fatigue or temperature intolerance  Hematologic/Lymphatic: No bleeding problems, blood clots or swollen lymph nodes  Allergic/Immunologic: No nasal congestion or hives  All systems negative except as marked. Objective:  /64 (Site: Left Upper Arm, Position: Sitting, Cuff Size: Large Adult)   Pulse 78   Ht 5' 7\" (1.702 m)   Wt 212 lb (96.2 kg)   BMI 33.20 kg/m²   Wt Readings from Last 3 Encounters:   02/07/23 212 lb (96.2 kg)   01/24/23 208 lb (94.3 kg)   01/16/23 208 lb (94.3 kg)     Body mass index is 33.2 kg/m².   GENERAL - Alert, oriented, pleasant, in no apparent distress,normal grooming  HEENT - pupils are intact, cornea intact, conjunctive normal color, ears are normal in exam,  Neck - Supple. No jugular venous distention noted. No carotid bruits, no apical -carotid delay  Respiratory:  Normal breath sounds, No respiratory distress, No wheezing, No chest tenderness. ,no use of accessory muscles, diaphragm movement is normal  Cardiovascular: (PMI) apex non displaced,no lifts no thrills, no s3,no s4, Normal heart rate, Normal rhythm, No murmurs, No rubs, No gallops. Carotid arteries pulse and amplitude are normal no bruit, no abdominal bruit noted ( normal abdominal aorta ausculation),   Extremities - No cyanosis, clubbing, or significant edema, no varicose veins    Abdomen - No masses, tenderness, or organomegaly, no hepato-splenomegally, no bruits  Musculoskeletal - No significant edema, no kyphosis or scoliosis, no deformity in any extremity noted, muscle strength and tone are normal  Skin: no ulcer,no scar,no stasis dermatitis   Neurologic - alert oriented times 3,Cranial nerves II through XII are grossly intact. There were no gross focal neurologic abnormalities. Psychiatric: normal mood and affect    Lab Results   Component Value Date/Time    CKTOTAL 53 01/09/2023 09:22 AM     BNP:  No results found for: BNP  PT/INR:  No results found for: PTINR  Lab Results   Component Value Date    LABA1C 5.2 09/19/2022    LABA1C 5.4 03/16/2022     Lab Results   Component Value Date    CHOL 131 09/19/2022    TRIG 70 09/19/2022    HDL 69 (H) 09/19/2022    LDLCALC 48 09/19/2022     Lab Results   Component Value Date    ALT 38 01/09/2023    AST 30 01/09/2023     TSH:    Lab Results   Component Value Date/Time    TSH 3.28 09/13/2021 08:00 AM       Impression:  Sabrina Sinclair is a 74 y. o.year old who  has a past medical history of Essential hypertension, GERD (gastroesophageal reflux disease), History of cardiac monitoring, Hyperlipidemia, and Hypothyroidism.  and presents with     Plan:  Chest pain:had LHC and tortoud artery with some buckling and bridiging on the mid LAD, could not tolerate imdur, has headache, will stop imdur, add lopressor 12.5mg bid   3 days holter monitor showed sinus rhtyhm with brie\f SVT 4 beats, will add lopressor 12.5mg bid  HTN: stable,c ontinue valsartan/hctz,  Dyslipidemia: stable, continue statins  Health maintenance: exerise and dietChest pain: DISCHARGED from hospotal, had normal stress test and echo in hosptial, STAIL HAS CHEST PAIN LIKE ELEPHANT SITTING ON THE CHEST, PATIENT is requesting [facility] scheduled,risk and benefit explained and consent obtained  HTN: stable,c ontinue valsartan/hctz,  Dyslipidemia: stable, continue statins  Health maintenance: exerise and diet  All labs, medications and tests reviewed, continue all other medications of all above medical condition listed as is.     [unfilled]

## 2023-02-07 NOTE — PROGRESS NOTES
Vein \"LEG PROBLEM Questionnaire\"  Do you have prominent leg veins? Yes   Do you have any skin discoloration? No  Do you have any healed or active sores? No  Do you have swelling of the legs? No  Do you have a family history of varicose veins? 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 Yes  7. Have you been fighting overweight problems? Yes  8. Do you have restless legs? No  9. Do you have any night time cramps? No  10. Do you have any of the following in your legs:         I  11. If Yes - Have they worn compression stockings No  12.  If they have worn compression stockings

## 2023-02-07 NOTE — PATIENT INSTRUCTIONS
Please be informed that if you contact our office outside of normal business hours the physician on call cannot help with any scheduling or rescheduling issues, procedure instruction questions or any type of medication issue. We advise you for any urgent/emergency that you go to the nearest emergency room! PLEASE CALL OUR OFFICE DURING NORMAL BUSINESS HOURS    Monday - Friday   8 am to 5 pm    [address]: [phone]    [place]:  [phone]  **It is YOUR responsibilty to bring medication bottles and/or updated medication list to [address] [place] [address]. This will allow us to better serve you and all your healthcare needs**  [facility] Laboratory Locations - No appointment necessary. Sites open Monday to Friday. Call your preferred location for test preparation, business   hours and other information you need. [place] [employer]. [address], [address] [facility]  Phone: [phone] [place]  [address] [facility]  [address] [place], [address] [place] [address]  Phone: [phone]     Thank you for allowing us to care for you today! We want to ensure we can follow your treatment plan and we strive to give you the best outcomes and experience possible. If you ever have a life threatening emergency and call 911 - for an ambulance (EMS)   Our providers can only care for you at:   [facility] or [facility]. Even if you have someone take you or you drive yourself we can only care for you in a [facility]. Our providers are not setup at the other healthcare locations!

## 2023-03-08 DIAGNOSIS — E03.9 HYPOTHYROIDISM, UNSPECIFIED TYPE: ICD-10-CM

## 2023-03-08 DIAGNOSIS — E78.5 HYPERLIPIDEMIA, UNSPECIFIED HYPERLIPIDEMIA TYPE: ICD-10-CM

## 2023-03-08 DIAGNOSIS — I10 ESSENTIAL HYPERTENSION: ICD-10-CM

## 2023-03-08 LAB
A/G RATIO: 1.8 (ref 1.1–2.2)
ALBUMIN SERPL-MCNC: 4.2 G/DL (ref 3.4–5)
ALP BLD-CCNC: 69 U/L (ref 40–129)
ALT SERPL-CCNC: 31 U/L (ref 10–40)
ANION GAP SERPL CALCULATED.3IONS-SCNC: 13 MMOL/L (ref 3–16)
AST SERPL-CCNC: 22 U/L (ref 15–37)
BASOPHILS ABSOLUTE: 0 K/UL (ref 0–0.2)
BASOPHILS RELATIVE PERCENT: 0.4 %
BILIRUB SERPL-MCNC: 0.7 MG/DL (ref 0–1)
BUN BLDV-MCNC: 15 MG/DL (ref 7–20)
CALCIUM SERPL-MCNC: 10.2 MG/DL (ref 8.3–10.6)
CHLORIDE BLD-SCNC: 104 MMOL/L (ref 99–110)
CHOLESTEROL, FASTING: 129 MG/DL (ref 0–199)
CO2: 27 MMOL/L (ref 21–32)
CREAT SERPL-MCNC: 0.9 MG/DL (ref 0.6–1.2)
EOSINOPHILS ABSOLUTE: 0.1 K/UL (ref 0–0.6)
EOSINOPHILS RELATIVE PERCENT: 1.1 %
GFR SERPL CREATININE-BSD FRML MDRD: >60 ML/MIN/{1.73_M2}
GLUCOSE BLD-MCNC: 97 MG/DL (ref 70–99)
HCT VFR BLD CALC: 42.4 % (ref 36–48)
HDLC SERPL-MCNC: 72 MG/DL (ref 40–60)
HEMOGLOBIN: 14.2 G/DL (ref 12–16)
LDL CHOLESTEROL CALCULATED: 43 MG/DL
LYMPHOCYTES ABSOLUTE: 2.3 K/UL (ref 1–5.1)
LYMPHOCYTES RELATIVE PERCENT: 29.2 %
MCH RBC QN AUTO: 32.1 PG (ref 26–34)
MCHC RBC AUTO-ENTMCNC: 33.4 G/DL (ref 31–36)
MCV RBC AUTO: 96 FL (ref 80–100)
MONOCYTES ABSOLUTE: 0.5 K/UL (ref 0–1.3)
MONOCYTES RELATIVE PERCENT: 6 %
NEUTROPHILS ABSOLUTE: 4.9 K/UL (ref 1.7–7.7)
NEUTROPHILS RELATIVE PERCENT: 63.3 %
PDW BLD-RTO: 13.4 % (ref 12.4–15.4)
PLATELET # BLD: 230 K/UL (ref 135–450)
PMV BLD AUTO: 9 FL (ref 5–10.5)
POTASSIUM SERPL-SCNC: 4.7 MMOL/L (ref 3.5–5.1)
RBC # BLD: 4.42 M/UL (ref 4–5.2)
SODIUM BLD-SCNC: 144 MMOL/L (ref 136–145)
TOTAL PROTEIN: 6.5 G/DL (ref 6.4–8.2)
TRIGLYCERIDE, FASTING: 68 MG/DL (ref 0–150)
TSH REFLEX: 3.22 UIU/ML (ref 0.27–4.2)
VLDLC SERPL CALC-MCNC: 14 MG/DL
WBC # BLD: 7.8 K/UL (ref 4–11)

## 2023-03-13 ENCOUNTER — OFFICE VISIT (OUTPATIENT)
Dept: FAMILY MEDICINE CLINIC | Age: 73
End: 2023-03-13
Payer: MEDICARE

## 2023-03-13 VITALS
SYSTOLIC BLOOD PRESSURE: 124 MMHG | WEIGHT: 207 LBS | HEART RATE: 56 BPM | HEIGHT: 67 IN | DIASTOLIC BLOOD PRESSURE: 74 MMHG | BODY MASS INDEX: 32.49 KG/M2 | OXYGEN SATURATION: 99 %

## 2023-03-13 DIAGNOSIS — I10 ESSENTIAL HYPERTENSION: Primary | ICD-10-CM

## 2023-03-13 DIAGNOSIS — M17.12 PRIMARY OSTEOARTHRITIS OF LEFT KNEE: ICD-10-CM

## 2023-03-13 DIAGNOSIS — K21.9 GASTROESOPHAGEAL REFLUX DISEASE, UNSPECIFIED WHETHER ESOPHAGITIS PRESENT: ICD-10-CM

## 2023-03-13 DIAGNOSIS — E03.9 HYPOTHYROIDISM, UNSPECIFIED TYPE: ICD-10-CM

## 2023-03-13 DIAGNOSIS — N18.31 STAGE 3A CHRONIC KIDNEY DISEASE (HCC): ICD-10-CM

## 2023-03-13 DIAGNOSIS — E78.5 HYPERLIPIDEMIA, UNSPECIFIED HYPERLIPIDEMIA TYPE: ICD-10-CM

## 2023-03-13 PROCEDURE — 3074F SYST BP LT 130 MM HG: CPT

## 2023-03-13 PROCEDURE — G8417 CALC BMI ABV UP PARAM F/U: HCPCS

## 2023-03-13 PROCEDURE — G8484 FLU IMMUNIZE NO ADMIN: HCPCS

## 2023-03-13 PROCEDURE — 1090F PRES/ABSN URINE INCON ASSESS: CPT

## 2023-03-13 PROCEDURE — G8400 PT W/DXA NO RESULTS DOC: HCPCS

## 2023-03-13 PROCEDURE — 1123F ACP DISCUSS/DSCN MKR DOCD: CPT

## 2023-03-13 PROCEDURE — 1036F TOBACCO NON-USER: CPT

## 2023-03-13 PROCEDURE — 99214 OFFICE O/P EST MOD 30 MIN: CPT

## 2023-03-13 PROCEDURE — 3078F DIAST BP <80 MM HG: CPT

## 2023-03-13 PROCEDURE — G9899 SCRN MAM PERF RSLTS DOC: HCPCS

## 2023-03-13 PROCEDURE — 3017F COLORECTAL CA SCREEN DOC REV: CPT

## 2023-03-13 PROCEDURE — G8427 DOCREV CUR MEDS BY ELIG CLIN: HCPCS

## 2023-03-13 RX ORDER — PANTOPRAZOLE SODIUM 40 MG/1
40 TABLET, DELAYED RELEASE ORAL DAILY
Qty: 90 TABLET | Refills: 1 | Status: SHIPPED | OUTPATIENT
Start: 2023-03-13

## 2023-03-13 RX ORDER — MELOXICAM 7.5 MG/1
7.5 TABLET ORAL 2 TIMES DAILY PRN
Qty: 60 TABLET | Refills: 1 | Status: SHIPPED | OUTPATIENT
Start: 2023-03-13

## 2023-03-13 RX ORDER — LEVOTHYROXINE SODIUM 0.05 MG/1
50 TABLET ORAL DAILY
Qty: 90 TABLET | Refills: 1 | Status: SHIPPED | OUTPATIENT
Start: 2023-03-13

## 2023-03-13 RX ORDER — VALSARTAN 160 MG/1
160 TABLET ORAL DAILY
Qty: 30 TABLET | Refills: 1 | Status: SHIPPED | OUTPATIENT
Start: 2023-03-13

## 2023-03-13 RX ORDER — ATORVASTATIN CALCIUM 40 MG/1
40 TABLET, FILM COATED ORAL DAILY
Qty: 90 TABLET | Refills: 1 | Status: SHIPPED | OUTPATIENT
Start: 2023-03-13

## 2023-03-13 ASSESSMENT — ENCOUNTER SYMPTOMS
ABDOMINAL DISTENTION: 0
COLOR CHANGE: 0
DIARRHEA: 0
ABDOMINAL PAIN: 0
CONSTIPATION: 0
NAUSEA: 0
VOMITING: 0
SHORTNESS OF BREATH: 0
BLOOD IN STOOL: 0
WHEEZING: 0

## 2023-03-13 ASSESSMENT — PATIENT HEALTH QUESTIONNAIRE - PHQ9
SUM OF ALL RESPONSES TO PHQ QUESTIONS 1-9: 0
SUM OF ALL RESPONSES TO PHQ9 QUESTIONS 1 & 2: 0
SUM OF ALL RESPONSES TO PHQ QUESTIONS 1-9: 0
1. LITTLE INTEREST OR PLEASURE IN DOING THINGS: 0
SUM OF ALL RESPONSES TO PHQ QUESTIONS 1-9: 0
2. FEELING DOWN, DEPRESSED OR HOPELESS: 0
SUM OF ALL RESPONSES TO PHQ QUESTIONS 1-9: 0

## 2023-03-13 NOTE — PROGRESS NOTES
3/13/2023    Rasheedemely Schofield    Chief Complaint   Patient presents with    6 Month Follow-Up     - pt want to take valsartan without the diuretic. Pt states she urinates mulitple times per day and night. HPI  History was obtained from patient. Maninder Sutherland is a pleasant 74 y.o. female who presents today for 6 month follow up. She follows with Dr. Marisel Quinonez     HTN: She notes increased urination- especially at bedtime. She has had these symptoms for the past 7 years but these are getting more troublesome. Denies swelling to lower extremities. CKD3a: GFR >60 and creatinine 0.9 with most recent labs. HLD: LDL well controlled <50; triglycerides much  improved from 2022. She is tolerating Atorvastatin well without side effects. Hypothyroidism: TSH stable on 50mcg Levothyroxine. GERD: Stable on Protonix- she has been on this for at least a year. When she takes days off of this her symptoms increase significantly. OA Left Knee: She follows with Dr. Mancilla- and states that he is considering left total knee replacement . She has had Meloxicam in the past and this helps her knee pain. Care Gaps:  Dexa: Had one several years ago and was told she did not need any additional follow up. 1. Essential hypertension    2. Stage 3a chronic kidney disease ([address]    3. Hyperlipidemia, unspecified hyperlipidemia type    4. Hypothyroidism, unspecified type    5. Gastroesophageal reflux disease, unspecified whether esophagitis present    6. Primary osteoarthritis of left knee             REVIEW OF SYMPTOMS    Review of Systems   Constitutional:  Negative for activity change, appetite change, chills, fever and unexpected weight change. Respiratory:  Negative for shortness of breath and wheezing. Cardiovascular:  Negative for chest pain (not since she was in the hospital- s/p LHC with Dr. Marisel Quinonez) and palpitations.    Gastrointestinal:  Negative for abdominal distention, abdominal pain, blood in stool, constipation, diarrhea, nausea and vomiting. Skin:  Negative for color change. Neurological:  Negative for syncope and headaches. Psychiatric/Behavioral:  Negative for sleep disturbance. The patient is not nervous/anxious. PAST MEDICAL HISTORY  Past Medical History:   Diagnosis Date    Essential hypertension 07/20/2019    GERD (gastroesophageal reflux disease) 07/20/2019    History of cardiac cath 01/24/2023    LAD: Mild Lumen Irregularity. mid LAD myocardial bridging noted    History of cardiac monitoring     Hyperlipidemia 07/20/2019    Hypothyroidism 07/20/2019       FAMILY HISTORY  Family History   Problem Relation Age of Onset    Other Mother         unknown cardiac    Other Father         unknown cardiac    Heart Disease Sister     Pacemaker Sister        SOCIAL HISTORY  Social History     Socioeconomic History    Marital status:      Spouse name: None    Number of children: None    Years of education: None    Highest education level: None   Tobacco Use    Smoking status: Never    Smokeless tobacco: Never   Vaping Use    Vaping Use: Never used   Substance and Sexual Activity    Alcohol use: Never     Comment: Caffiene - 2 cups of coffee/day    Drug use: Never     Social Determinants of Health     Financial Resource Strain: Medium Risk    Difficulty of Paying Living Expenses: Somewhat hard   Food Insecurity: No Food Insecurity    Worried About Running Out of Food in the Last Year: Never true    Ran Out of Food in the Last Year: Never true   Transportation Needs: No Transportation Needs    Lack of Transportation (Medical): No    Lack of Transportation (Non-Medical): No   Physical Activity: Inactive    Days of Exercise per Week: 0 days    Minutes of Exercise per Session: 0 min   Stress: No Stress Concern Present    Feeling of Stress : Only a little   Social Connections: Moderately Isolated    Frequency of Communication with Friends and Family:  Three times a week    Frequency of Social Gatherings with Friends and Family: Once a week    Attends Restoration Services: Never    Active Member of Clubs or Organizations: No    Attends Club or Organization Meetings: Never    Marital Status:    Intimate Partner Violence: Not At Risk    Fear of Current or Ex-Partner: No    Emotionally Abused: No    Physically Abused: No    Sexually Abused: No   Housing Stability: Low Risk     Unable to Pay for Housing in the Last Year: No    Number of [place] in the Last Year: 1    Unstable Housing in the Last Year: No        SURGICAL HISTORY  Past Surgical History:   Procedure Laterality Date    HYSTERECTOMY (CERVIX STATUS UNKNOWN)  1970    KNEE ARTHROSCOPY Left 2013    KNEE CARTILAGE SURGERY Left 2022    LUMBAR SPINE SURGERY  2009 & 2014    Herniated disc repair    NECK SURGERY  2004    cervical disc repair                 CURRENT MEDICATIONS  Current Outpatient Medications   Medication Sig Dispense Refill    atorvastatin (LIPITOR) 40 MG tablet Take 1 tablet by mouth daily 90 tablet 1    levothyroxine (SYNTHROID) 50 MCG tablet Take 1 tablet by mouth daily 90 tablet 1    pantoprazole (PROTONIX) 40 MG tablet Take 1 tablet by mouth daily 90 tablet 1    valsartan (DIOVAN) 160 MG tablet Take 1 tablet by mouth daily 30 tablet 1    meloxicam (MOBIC) 7.5 MG tablet Take 1 tablet by mouth 2 times daily as needed for Pain (Left knee pain) 60 tablet 1    metoprolol tartrate (LOPRESSOR) 25 MG tablet Take 0.5 tablets by mouth 2 times daily 90 tablet 3    aspirin 81 MG EC tablet Take 1 tablet by mouth daily 30 tablet 3    Vitamin D, Cholecalciferol, 25 MCG (1000 UT) TABS Take 1,000 Units by mouth daily      GLUCOSAMINE-CHONDROITIN PO Take 1 capsule by mouth 2 times daily      Calcium Carbonate (CALCIUM 600 PO) Take 1 tablet by mouth 2 times daily      Omega-3 Fatty Acids (FISH OIL) 1200 MG CAPS Take 1 capsule by mouth daily      meclizine (ANTIVERT) 25 MG tablet Take 25 mg by mouth 3 times daily as needed       No current facility-administered medications for this visit. ALLERGIES  Allergies   Allergen Reactions    Codeine Other (See Comments)     hallucinations    Morphine        PHYSICAL EXAM    /74 (Site: Right Upper Arm, Position: Sitting, Cuff Size: Large Adult)   Pulse 56   Ht 5' 7\" (1.702 m)   Wt 207 lb (93.9 kg)   SpO2 99%   BMI 32.42 kg/m²     Physical Exam  Vitals and nursing note reviewed. Constitutional:       General: She is not in acute distress. Appearance: Normal appearance. She is well-developed. HENT:      Head: Normocephalic and atraumatic. Eyes:      General: No scleral icterus. Conjunctiva/sclera: Conjunctivae normal.   Neck:      Thyroid: No thyromegaly. Trachea: No tracheal deviation. Cardiovascular:      Rate and Rhythm: Normal rate and regular rhythm. Heart sounds: Normal heart sounds. No murmur heard. No friction rub. No gallop. Pulmonary:      Effort: Pulmonary effort is normal. No respiratory distress. Breath sounds: Normal breath sounds. No wheezing or rales. Abdominal:      General: Bowel sounds are normal. There is no distension. Palpations: Abdomen is soft. There is no hepatomegaly or mass. Tenderness: There is no abdominal tenderness. There is no guarding or rebound. Musculoskeletal:         General: No swelling or deformity. Normal range of motion. Cervical back: Neck supple. Lymphadenopathy:      Cervical: No cervical adenopathy. Skin:     General: Skin is warm and dry. Nails: There is no clubbing. Neurological:      Mental Status: She is alert and oriented to person, place, and time. Mental status is at baseline. Psychiatric:         Mood and Affect: Mood normal.         Behavior: Behavior normal.         Thought Content: Thought content normal.         Judgment: Judgment normal.       ASSESSMENT & PLAN    Rebecca ANSARI was seen today for 6 month follow-up.     Diagnoses and all orders for this visit:    Essential hypertension  Stable- would like to stop Hctz due to frequent urination. Will trial Valsartan without Hctz and recheck BP in 1-2 weeks to ensure proper control.   -     valsartan (DIOVAN) 160 MG tablet; Take 1 tablet by mouth daily    Stage 3a chronic kidney disease ([address]  Stable- advised caution with NSAID- has follow up scheduled with Dr. Rene Feliz to discuss possible surgery. Hyperlipidemia, unspecified hyperlipidemia type  Condition stable on current regimen. No changes. -     atorvastatin (LIPITOR) 40 MG tablet; Take 1 tablet by mouth daily    Hypothyroidism, unspecified type  Condition stable on current regimen. No changes. -     levothyroxine (SYNTHROID) 50 MCG tablet; Take 1 tablet by mouth daily    Gastroesophageal reflux disease, unspecified whether esophagitis present  Condition stable on current regimen. No changes. -     pantoprazole (PROTONIX) 40 MG tablet; Take 1 tablet by mouth daily    Primary osteoarthritis of left knee  Encouraged sparing use- continue with follow up with Dr. Rene Feliz as planned. -     meloxicam (MOBIC) 7.5 MG tablet; Take 1 tablet by mouth 2 times daily as needed for Pain (Left knee pain)      Return in about 6 months (around 9/13/2023), or BP recheck in the next 1-2 weeks.          Electronically signed by PRIYA Lee CNP on 3/13/2023

## 2023-03-16 ENCOUNTER — TELEPHONE (OUTPATIENT)
Dept: CARDIOLOGY CLINIC | Age: 73
End: 2023-03-16

## 2023-03-20 ENCOUNTER — NURSE ONLY (OUTPATIENT)
Dept: FAMILY MEDICINE CLINIC | Age: 73
End: 2023-03-20

## 2023-03-20 ENCOUNTER — TELEPHONE (OUTPATIENT)
Dept: FAMILY MEDICINE CLINIC | Age: 73
End: 2023-03-20

## 2023-03-20 VITALS — OXYGEN SATURATION: 98 % | HEART RATE: 59 BPM | SYSTOLIC BLOOD PRESSURE: 138 MMHG | DIASTOLIC BLOOD PRESSURE: 86 MMHG

## 2023-03-20 DIAGNOSIS — I10 ESSENTIAL HYPERTENSION: ICD-10-CM

## 2023-03-20 DIAGNOSIS — I10 ESSENTIAL HYPERTENSION: Primary | ICD-10-CM

## 2023-03-20 PROCEDURE — 99999 PR OFFICE/OUTPT VISIT,PROCEDURE ONLY: CPT

## 2023-03-20 RX ORDER — VALSARTAN AND HYDROCHLOROTHIAZIDE 160; 12.5 MG/1; MG/1
1 TABLET, FILM COATED ORAL DAILY
Qty: 90 TABLET | Refills: 1 | Status: SHIPPED | OUTPATIENT
Start: 2023-03-20

## 2023-03-20 NOTE — TELEPHONE ENCOUNTER
Requested Prescriptions     Signed Prescriptions Disp Refills    valsartan-hydroCHLOROthiazide (DIOVAN-HCT) 160-12.5 MG per tablet 90 tablet 1     Sig: Take 1 tablet by mouth daily     Authorizing Provider: Geno Corley User: TATI Webb     Pt informed

## 2023-03-20 NOTE — TELEPHONE ENCOUNTER
Pt in for bp recheck 3/20/23    Bp 138/86 p 59 02 98%    pt requested to dc hctz at last office visit, pt now having swelling bilateral lower legs. Requesting to go back on original regimen valsartant hctz 160/12/.5 mg 1 qd vs valsartan 160 mg 1 qd. Ok to restart valsartan/hctz?     [facility]

## 2023-05-09 ENCOUNTER — TELEPHONE (OUTPATIENT)
Dept: CARDIOLOGY CLINIC | Age: 73
End: 2023-05-09

## 2023-05-09 NOTE — TELEPHONE ENCOUNTER
Cardiologist: Dr. Holder Human  Surgeon: Dr. Siri Cole  Surgery: Left total knee replacement  Anesthesia: Spinal  Date: TBD  FAX# [phone]    Ph# [phone]        Last OV 2/7/2023 w/Veronica        Chest pain:had LHC and tortoud artery with some buckling and bridiging on the mid LAD, could not tolerate imdur, has headache, will stop imdur, add lopressor 12.5mg bid   3 days holter monitor showed sinus rhtyhm with brie\f SVT 4 beats, will add lopressor 12.5mg bid  HTN: stable,c ontinue valsartan/hctz,  Dyslipidemia: stable, continue statins  Health maintenance: exerise and dietChest pain: DISCHARGED from hospotal, had normal stress test and echo in hosptial, STAIL HAS CHEST PAIN LIKE ELEPHANT SITTING ON THE CHEST, PATIENT is requesting LHC scheduled,risk and benefit explained and consent obtained  HTN: stable,c ontinue valsartan/hctz,  Dyslipidemia: stable, continue statins           Last EKG- 1/10/2023        NM- 1/24/2023   Normal tracer uptake in all segments of myocardium on stress ans rest    images. Normal Lexiscan nuclear scintigraphic study suggestive of normal    myocardial perfusion. Gated images demonstrate normal left ventricular    systolic function with EF of 60 %. Echo- 1/9/2023   Left ventricular systolic function is normal.   Ejection fraction is visually estimated at 55-60%. No significant valvular disease noted. No evidence of any pericardial effusion. Pericardial fat pad noted.         Cath- 1/24/2023   normal coronaries, mid LAD myocardial bridging noted, ascending   aorta appeared aneurysmal during coronary catheter manipulation,   hence pig tail was advanced into the distal aorta and injected   with contrast which showed 4.1 cm ascending aorta ( mildy   aneurysmal)           Aspirin

## 2023-05-30 LAB — MAMMOGRAPHY, EXTERNAL: NEGATIVE

## 2023-06-01 NOTE — PROGRESS NOTES
Pt arrives to cath lab holding area room 10; family at bedside
Received from cath lab.  Monitor and alarms on. Call light within reach.   
Contraindicated

## 2023-06-08 ENCOUNTER — OFFICE VISIT (OUTPATIENT)
Dept: CARDIOLOGY CLINIC | Age: 73
End: 2023-06-08
Payer: MEDICARE

## 2023-06-08 VITALS
WEIGHT: 213 LBS | HEIGHT: 67 IN | OXYGEN SATURATION: 95 % | DIASTOLIC BLOOD PRESSURE: 72 MMHG | HEART RATE: 68 BPM | SYSTOLIC BLOOD PRESSURE: 120 MMHG | BODY MASS INDEX: 33.43 KG/M2

## 2023-06-08 DIAGNOSIS — I10 ESSENTIAL HYPERTENSION: Primary | ICD-10-CM

## 2023-06-08 DIAGNOSIS — Z01.810 PREOP CARDIOVASCULAR EXAM: ICD-10-CM

## 2023-06-08 PROCEDURE — G8417 CALC BMI ABV UP PARAM F/U: HCPCS | Performed by: INTERNAL MEDICINE

## 2023-06-08 PROCEDURE — G9899 SCRN MAM PERF RSLTS DOC: HCPCS | Performed by: INTERNAL MEDICINE

## 2023-06-08 PROCEDURE — 93000 ELECTROCARDIOGRAM COMPLETE: CPT | Performed by: INTERNAL MEDICINE

## 2023-06-08 PROCEDURE — 3074F SYST BP LT 130 MM HG: CPT | Performed by: INTERNAL MEDICINE

## 2023-06-08 PROCEDURE — G8427 DOCREV CUR MEDS BY ELIG CLIN: HCPCS | Performed by: INTERNAL MEDICINE

## 2023-06-08 PROCEDURE — 1123F ACP DISCUSS/DSCN MKR DOCD: CPT | Performed by: INTERNAL MEDICINE

## 2023-06-08 PROCEDURE — 1090F PRES/ABSN URINE INCON ASSESS: CPT | Performed by: INTERNAL MEDICINE

## 2023-06-08 PROCEDURE — 1036F TOBACCO NON-USER: CPT | Performed by: INTERNAL MEDICINE

## 2023-06-08 PROCEDURE — 3017F COLORECTAL CA SCREEN DOC REV: CPT | Performed by: INTERNAL MEDICINE

## 2023-06-08 PROCEDURE — G8400 PT W/DXA NO RESULTS DOC: HCPCS | Performed by: INTERNAL MEDICINE

## 2023-06-08 PROCEDURE — 3078F DIAST BP <80 MM HG: CPT | Performed by: INTERNAL MEDICINE

## 2023-06-08 PROCEDURE — 99214 OFFICE O/P EST MOD 30 MIN: CPT | Performed by: INTERNAL MEDICINE

## 2023-06-08 NOTE — PROGRESS NOTES
Darryl Zamora MD        OFFICE  FOLLOWUP NOTE    Chief complaints: patient is here for management of CAD, chest pain, HTN, DYSLPIDEMIA, preop    Subjective: patient feels better, no chest pain, no shortness of breath, no dizziness, no palpitations    HPI Ellyn Velez is a 74 y. o.year old who  has a past medical history of Essential hypertension, GERD (gastroesophageal reflux disease), History of cardiac cath, History of cardiac monitoring, Hyperlipidemia, and Hypothyroidism. and presents for management of CAD, chest pain, HTN, DYSLPIDEMIA, preopwhich are well controlled      Current Outpatient Medications   Medication Sig Dispense Refill    metoprolol tartrate (LOPRESSOR) 25 MG tablet Take 0.5 tablets by mouth 2 times daily 90 tablet 3    valsartan-hydroCHLOROthiazide (DIOVAN-HCT) 160-12.5 MG per tablet Take 1 tablet by mouth daily 90 tablet 1    atorvastatin (LIPITOR) 40 MG tablet Take 1 tablet by mouth daily 90 tablet 1    levothyroxine (SYNTHROID) 50 MCG tablet Take 1 tablet by mouth daily 90 tablet 1    pantoprazole (PROTONIX) 40 MG tablet Take 1 tablet by mouth daily 90 tablet 1    aspirin 81 MG EC tablet Take 1 tablet by mouth daily 30 tablet 3    Vitamin D, Cholecalciferol, 25 MCG (1000 UT) TABS Take 1,000 Units by mouth daily      GLUCOSAMINE-CHONDROITIN PO Take 1 capsule by mouth 2 times daily      Calcium Carbonate (CALCIUM 600 PO) Take 1 tablet by mouth 2 times daily      Omega-3 Fatty Acids (FISH OIL) 1200 MG CAPS Take 1,200 mg by mouth daily      meloxicam (MOBIC) 7.5 MG tablet Take 1 tablet by mouth 2 times daily as needed for Pain (Left knee pain) (Patient not taking: Reported on 6/8/2023) 60 tablet 1    meclizine (ANTIVERT) 25 MG tablet Take 25 mg by mouth 3 times daily as needed (Patient not taking: Reported on 6/8/2023)       No current facility-administered medications for this visit.      Allergies: Codeine and Morphine  Past Medical History:   Diagnosis Date    Essential

## 2023-08-07 ENCOUNTER — OFFICE VISIT (OUTPATIENT)
Dept: CARDIOLOGY CLINIC | Age: 73
End: 2023-08-07
Payer: MEDICARE

## 2023-08-07 VITALS
BODY MASS INDEX: 32.02 KG/M2 | SYSTOLIC BLOOD PRESSURE: 114 MMHG | DIASTOLIC BLOOD PRESSURE: 62 MMHG | HEIGHT: 67 IN | WEIGHT: 204 LBS | OXYGEN SATURATION: 99 % | HEART RATE: 70 BPM

## 2023-08-07 DIAGNOSIS — R06.02 SHORTNESS OF BREATH: Primary | ICD-10-CM

## 2023-08-07 PROCEDURE — 3017F COLORECTAL CA SCREEN DOC REV: CPT | Performed by: INTERNAL MEDICINE

## 2023-08-07 PROCEDURE — 1036F TOBACCO NON-USER: CPT | Performed by: INTERNAL MEDICINE

## 2023-08-07 PROCEDURE — 1090F PRES/ABSN URINE INCON ASSESS: CPT | Performed by: INTERNAL MEDICINE

## 2023-08-07 PROCEDURE — 3074F SYST BP LT 130 MM HG: CPT | Performed by: INTERNAL MEDICINE

## 2023-08-07 PROCEDURE — G8427 DOCREV CUR MEDS BY ELIG CLIN: HCPCS | Performed by: INTERNAL MEDICINE

## 2023-08-07 PROCEDURE — G8417 CALC BMI ABV UP PARAM F/U: HCPCS | Performed by: INTERNAL MEDICINE

## 2023-08-07 PROCEDURE — 3078F DIAST BP <80 MM HG: CPT | Performed by: INTERNAL MEDICINE

## 2023-08-07 PROCEDURE — 99214 OFFICE O/P EST MOD 30 MIN: CPT | Performed by: INTERNAL MEDICINE

## 2023-08-07 PROCEDURE — G8400 PT W/DXA NO RESULTS DOC: HCPCS | Performed by: INTERNAL MEDICINE

## 2023-08-07 PROCEDURE — G9899 SCRN MAM PERF RSLTS DOC: HCPCS | Performed by: INTERNAL MEDICINE

## 2023-08-07 PROCEDURE — 1123F ACP DISCUSS/DSCN MKR DOCD: CPT | Performed by: INTERNAL MEDICINE

## 2023-08-07 NOTE — PROGRESS NOTES
Bharati Caballero MD        OFFICE  FOLLOWUP NOTE    Chief complaints: patient is here for management of CAD, chest pain, HTN, DYSLPIDEMIACAD, chest pain, HTN, DYSLPIDEMIA    Subjective: patient had left knee pain after sx no chest pain, no shortness of breath, no dizziness, no palpitations    HPI Michell Pinzon is a 74 y. o.year old who  has a past medical history of Essential hypertension, GERD (gastroesophageal reflux disease), History of cardiac cath, History of cardiac monitoring, Hyperlipidemia, and Hypothyroidism. and presents for management of CAD, chest pain, HTN, DYSLPIDEMIACAD, chest pain, HTN, DYSLPIDEMIA, which are well controlled      Current Outpatient Medications   Medication Sig Dispense Refill    metoprolol tartrate (LOPRESSOR) 25 MG tablet Take 0.5 tablets by mouth 2 times daily 90 tablet 3    valsartan-hydroCHLOROthiazide (DIOVAN-HCT) 160-12.5 MG per tablet Take 1 tablet by mouth daily 90 tablet 1    atorvastatin (LIPITOR) 40 MG tablet Take 1 tablet by mouth daily 90 tablet 1    levothyroxine (SYNTHROID) 50 MCG tablet Take 1 tablet by mouth daily 90 tablet 1    pantoprazole (PROTONIX) 40 MG tablet Take 1 tablet by mouth daily 90 tablet 1    aspirin 81 MG EC tablet Take 1 tablet by mouth daily 30 tablet 3    Vitamin D, Cholecalciferol, 25 MCG (1000 UT) TABS Take 1,000 Units by mouth daily      GLUCOSAMINE-CHONDROITIN PO Take 1 capsule by mouth 2 times daily      Calcium Carbonate (CALCIUM 600 PO) Take 1 tablet by mouth 2 times daily      Omega-3 Fatty Acids (FISH OIL) 1200 MG CAPS Take 1,200 mg by mouth daily      meloxicam (MOBIC) 7.5 MG tablet Take 1 tablet by mouth 2 times daily as needed for Pain (Left knee pain) (Patient not taking: Reported on 6/8/2023) 60 tablet 1    meclizine (ANTIVERT) 25 MG tablet Take 25 mg by mouth 3 times daily as needed (Patient not taking: Reported on 6/8/2023)       No current facility-administered medications for this visit.      Allergies: Codeine and

## 2023-08-08 ENCOUNTER — TELEPHONE (OUTPATIENT)
Dept: CARDIOLOGY CLINIC | Age: 73
End: 2023-08-08

## 2023-08-08 NOTE — TELEPHONE ENCOUNTER
Called and left message with time/day of CTA Pulmonary  Thursday September 7th arriving at the [address] at 12:30  NPO 4 hours prior

## 2023-08-09 ENCOUNTER — TELEMEDICINE (OUTPATIENT)
Dept: FAMILY MEDICINE CLINIC | Age: 73
End: 2023-08-09
Payer: MEDICARE

## 2023-08-09 DIAGNOSIS — Z00.00 MEDICARE ANNUAL WELLNESS VISIT, SUBSEQUENT: Primary | ICD-10-CM

## 2023-08-09 PROCEDURE — G0439 PPPS, SUBSEQ VISIT: HCPCS | Performed by: FAMILY MEDICINE

## 2023-08-09 PROCEDURE — 3017F COLORECTAL CA SCREEN DOC REV: CPT | Performed by: FAMILY MEDICINE

## 2023-08-09 PROCEDURE — 1123F ACP DISCUSS/DSCN MKR DOCD: CPT | Performed by: FAMILY MEDICINE

## 2023-08-09 ASSESSMENT — PATIENT HEALTH QUESTIONNAIRE - PHQ9
SUM OF ALL RESPONSES TO PHQ QUESTIONS 1-9: 0
SUM OF ALL RESPONSES TO PHQ QUESTIONS 1-9: 0
SUM OF ALL RESPONSES TO PHQ9 QUESTIONS 1 & 2: 0
1. LITTLE INTEREST OR PLEASURE IN DOING THINGS: 0
SUM OF ALL RESPONSES TO PHQ QUESTIONS 1-9: 0
2. FEELING DOWN, DEPRESSED OR HOPELESS: 0
SUM OF ALL RESPONSES TO PHQ QUESTIONS 1-9: 0

## 2023-08-09 NOTE — PATIENT INSTRUCTIONS
Personalized Preventive Plan for Omari Marvin - 8/9/2023  Medicare offers a range of preventive health benefits. Some of the tests and screenings are paid in full while other may be subject to a deductible, co-insurance, and/or copay. Some of these benefits include a comprehensive review of your medical history including lifestyle, illnesses that may run in your family, and various assessments and screenings as appropriate. After reviewing your medical record and screening and assessments performed today your provider may have ordered immunizations, labs, imaging, and/or referrals for you. A list of these orders (if applicable) as well as your Preventive Care list are included within your After Visit Summary for your review. Other Preventive Recommendations:    A preventive eye exam performed by an eye specialist is recommended every 1-2 years to screen for glaucoma; cataracts, macular degeneration, and other eye disorders. A preventive dental visit is recommended every 6 months. Try to get at least 150 minutes of exercise per week or 10,000 steps per day on a pedometer . Order or download the FREE \"Exercise & Physical Activity: Your Everyday Guide\" from [employer]. Call 3-270.594.5664 or search [employer] online. You need 2246-4739 mg of calcium and 1730-0747 IU of vitamin D per day. It is possible to meet your calcium requirement with diet alone, but a vitamin D supplement is usually necessary to meet this goal.  When exposed to the sun, use a sunscreen that protects against both UVA and UVB radiation with an SPF of 30 or greater. Reapply every 2 to 3 hours or after sweating, drying off with a towel, or swimming. Always wear a seat belt when traveling in a car. Always wear a helmet when riding a bicycle or motorcycle.

## 2023-08-09 NOTE — PROGRESS NOTES
Medicare Annual Wellness Visit    Patrick Graham is here for Medicare AWV    Assessment & Plan   Medicare annual wellness visit, subsequent  Recommendations for Preventive Services Due: see orders and patient instructions/AVS.  Recommended screening schedule for the next 5-10 years is provided to the patient in written form: see Patient Instructions/AVS.     No follow-ups on file. Subjective       Patient's complete Health Risk Assessment and screening values have been reviewed and are found in Flowsheets. The following problems were reviewed today and where indicated follow up appointments were made and/or referrals ordered. Positive Risk Factor Screenings with Interventions:    Fall Risk:  Do you feel unsteady or are you worried about falling? : (!) yes (right now due to knee-PT x 5 wks and 5 more visit sched)  2 or more falls in past year?: no  Fall with injury in past year?: no     Interventions:    Patient comments: states she worries right now about falling due to her knee. She has been doing Physical Therapy for the past 5 weeks and has 5 more visits to go. Patient declines any further evaluation or treatment              Weight and Activity:  Physical Activity: Sufficiently Active    Days of Exercise per Week: 7 days    Minutes of Exercise per Session: 40 min     On average, how many days per week do you engage in moderate to strenuous exercise (like a brisk walk)?: 7 days  Have you lost any weight without trying in the past 3 months?: (!) Yes (lost 8lbs since PT and is thrilled)  There is no height or weight on file to calculate BMI. (!) Abnormal    Unintentional Weight Loss Interventions:  Patient comments: states she has lost 8 lbs since she started PT 5 weeks ago and is thrilled.   Patient declined any further interventions or treatment  Obesity Interventions:  Patient declines any further evaluation or treatment            Vision Screen:  Do you have difficulty driving, watching TV, or doing any

## 2023-09-07 DIAGNOSIS — R73.01 IFG (IMPAIRED FASTING GLUCOSE): Chronic | ICD-10-CM

## 2023-09-07 DIAGNOSIS — E03.9 HYPOTHYROIDISM, UNSPECIFIED TYPE: ICD-10-CM

## 2023-09-07 DIAGNOSIS — I10 ESSENTIAL HYPERTENSION: ICD-10-CM

## 2023-09-07 DIAGNOSIS — E83.42 HYPOMAGNESEMIA: Primary | ICD-10-CM

## 2023-09-07 DIAGNOSIS — E83.42 HYPOMAGNESEMIA: ICD-10-CM

## 2023-09-07 LAB
ANION GAP SERPL CALCULATED.3IONS-SCNC: 11 MMOL/L (ref 3–16)
BASOPHILS # BLD: 0 K/UL (ref 0–0.2)
BASOPHILS NFR BLD: 0.5 %
BUN SERPL-MCNC: 15 MG/DL (ref 7–20)
CALCIUM SERPL-MCNC: 10.5 MG/DL (ref 8.3–10.6)
CHLORIDE SERPL-SCNC: 100 MMOL/L (ref 99–110)
CO2 SERPL-SCNC: 28 MMOL/L (ref 21–32)
CREAT SERPL-MCNC: 1 MG/DL (ref 0.6–1.2)
DEPRECATED RDW RBC AUTO: 13.8 % (ref 12.4–15.4)
EOSINOPHIL # BLD: 0.2 K/UL (ref 0–0.6)
EOSINOPHIL NFR BLD: 3.7 %
GFR SERPLBLD CREATININE-BSD FMLA CKD-EPI: 59 ML/MIN/{1.73_M2}
GLUCOSE SERPL-MCNC: 98 MG/DL (ref 70–99)
HCT VFR BLD AUTO: 38.1 % (ref 36–48)
HGB BLD-MCNC: 13.1 G/DL (ref 12–16)
LYMPHOCYTES # BLD: 1.6 K/UL (ref 1–5.1)
LYMPHOCYTES NFR BLD: 29.4 %
MAGNESIUM SERPL-MCNC: 1.8 MG/DL (ref 1.8–2.4)
MCH RBC QN AUTO: 32.7 PG (ref 26–34)
MCHC RBC AUTO-ENTMCNC: 34.5 G/DL (ref 31–36)
MCV RBC AUTO: 95 FL (ref 80–100)
MONOCYTES # BLD: 0.3 K/UL (ref 0–1.3)
MONOCYTES NFR BLD: 6.2 %
NEUTROPHILS # BLD: 3.3 K/UL (ref 1.7–7.7)
NEUTROPHILS NFR BLD: 60.2 %
PLATELET # BLD AUTO: 206 K/UL (ref 135–450)
PMV BLD AUTO: 8.8 FL (ref 5–10.5)
POTASSIUM SERPL-SCNC: 4.9 MMOL/L (ref 3.5–5.1)
RBC # BLD AUTO: 4.01 M/UL (ref 4–5.2)
SODIUM SERPL-SCNC: 139 MMOL/L (ref 136–145)
TSH SERPL DL<=0.005 MIU/L-ACNC: 2.5 UIU/ML (ref 0.27–4.2)
WBC # BLD AUTO: 5.4 K/UL (ref 4–11)

## 2023-09-08 DIAGNOSIS — E03.9 HYPOTHYROIDISM, UNSPECIFIED TYPE: ICD-10-CM

## 2023-09-08 DIAGNOSIS — K21.9 GASTROESOPHAGEAL REFLUX DISEASE, UNSPECIFIED WHETHER ESOPHAGITIS PRESENT: ICD-10-CM

## 2023-09-08 LAB
EST. AVERAGE GLUCOSE BLD GHB EST-MCNC: 108.3 MG/DL
HBA1C MFR BLD: 5.4 %

## 2023-09-08 RX ORDER — PANTOPRAZOLE SODIUM 40 MG/1
40 TABLET, DELAYED RELEASE ORAL DAILY
Qty: 90 TABLET | Refills: 1 | Status: SHIPPED | OUTPATIENT
Start: 2023-09-08

## 2023-09-08 RX ORDER — LEVOTHYROXINE SODIUM 0.05 MG/1
50 TABLET ORAL DAILY
Qty: 90 TABLET | Refills: 1 | Status: SHIPPED | OUTPATIENT
Start: 2023-09-08

## 2023-09-12 ENCOUNTER — OFFICE VISIT (OUTPATIENT)
Dept: FAMILY MEDICINE CLINIC | Age: 73
End: 2023-09-12

## 2023-09-12 VITALS
DIASTOLIC BLOOD PRESSURE: 66 MMHG | OXYGEN SATURATION: 99 % | SYSTOLIC BLOOD PRESSURE: 110 MMHG | HEIGHT: 67 IN | WEIGHT: 202.8 LBS | RESPIRATION RATE: 16 BRPM | BODY MASS INDEX: 31.83 KG/M2 | HEART RATE: 72 BPM

## 2023-09-12 DIAGNOSIS — Z23 NEED FOR INFLUENZA VACCINATION: ICD-10-CM

## 2023-09-12 DIAGNOSIS — I10 ESSENTIAL HYPERTENSION: Primary | ICD-10-CM

## 2023-09-12 DIAGNOSIS — E78.5 HYPERLIPIDEMIA, UNSPECIFIED HYPERLIPIDEMIA TYPE: ICD-10-CM

## 2023-09-12 DIAGNOSIS — E03.9 HYPOTHYROIDISM, UNSPECIFIED TYPE: ICD-10-CM

## 2023-09-12 DIAGNOSIS — K21.9 GASTROESOPHAGEAL REFLUX DISEASE, UNSPECIFIED WHETHER ESOPHAGITIS PRESENT: ICD-10-CM

## 2023-09-12 DIAGNOSIS — E83.42 HYPOMAGNESEMIA: ICD-10-CM

## 2023-09-12 DIAGNOSIS — R94.4 DECREASED GFR: ICD-10-CM

## 2023-09-12 RX ORDER — PANTOPRAZOLE SODIUM 40 MG/1
40 TABLET, DELAYED RELEASE ORAL DAILY
Qty: 90 TABLET | Refills: 1 | Status: SHIPPED | OUTPATIENT
Start: 2023-09-12

## 2023-09-12 RX ORDER — VALSARTAN AND HYDROCHLOROTHIAZIDE 160; 12.5 MG/1; MG/1
1 TABLET, FILM COATED ORAL DAILY
Qty: 90 TABLET | Refills: 1 | Status: SHIPPED | OUTPATIENT
Start: 2023-09-12

## 2023-09-12 RX ORDER — LEVOTHYROXINE SODIUM 0.05 MG/1
50 TABLET ORAL DAILY
Qty: 90 TABLET | Refills: 1 | Status: SHIPPED | OUTPATIENT
Start: 2023-09-12

## 2023-09-12 RX ORDER — GABAPENTIN 300 MG/1
CAPSULE ORAL
COMMUNITY
Start: 2023-09-06

## 2023-09-12 RX ORDER — ATORVASTATIN CALCIUM 40 MG/1
40 TABLET, FILM COATED ORAL DAILY
Qty: 90 TABLET | Refills: 1 | Status: SHIPPED | OUTPATIENT
Start: 2023-09-12

## 2023-09-12 ASSESSMENT — ENCOUNTER SYMPTOMS
CONSTIPATION: 0
NAUSEA: 0
SHORTNESS OF BREATH: 0
WHEEZING: 0
BLOOD IN STOOL: 0
DIARRHEA: 0
ABDOMINAL PAIN: 0
VOMITING: 0
COLOR CHANGE: 0
ABDOMINAL DISTENTION: 0

## 2023-09-12 NOTE — PROGRESS NOTES
Housing Stability: Low Risk  (1/9/2023)    Housing Stability Vital Sign     Unable to Pay for Housing in the Last Year: No     Number of Places Lived in the Last Year: 1     Unstable Housing in the Last Year: No        SURGICAL HISTORY  Past Surgical History:   Procedure Laterality Date    HYSTERECTOMY (CERVIX STATUS UNKNOWN)  1970    KNEE ARTHROSCOPY Left 2013    KNEE CARTILAGE SURGERY Left 2022    LUMBAR SPINE SURGERY  2009 & 2014    Herniated disc repair    NECK SURGERY  2004    cervical disc repair                 CURRENT MEDICATIONS  Current Outpatient Medications   Medication Sig Dispense Refill    atorvastatin (LIPITOR) 40 MG tablet Take 1 tablet by mouth daily 90 tablet 1    valsartan-hydroCHLOROthiazide (DIOVAN-HCT) 160-12.5 MG per tablet Take 1 tablet by mouth daily 90 tablet 1    levothyroxine (SYNTHROID) 50 MCG tablet Take 1 tablet by mouth daily 90 tablet 1    pantoprazole (PROTONIX) 40 MG tablet Take 1 tablet by mouth daily 90 tablet 1    metoprolol tartrate (LOPRESSOR) 25 MG tablet Take 0.5 tablets by mouth 2 times daily 90 tablet 3    aspirin 81 MG EC tablet Take 1 tablet by mouth daily 30 tablet 3    Vitamin D, Cholecalciferol, 25 MCG (1000 UT) TABS Take 1,000 Units by mouth daily      GLUCOSAMINE-CHONDROITIN PO Take 1 capsule by mouth 2 times daily      Calcium Carbonate (CALCIUM 600 PO) Take 1 tablet by mouth 2 times daily      Omega-3 Fatty Acids (FISH OIL) 1200 MG CAPS Take 1,200 mg by mouth daily      gabapentin (NEURONTIN) 300 MG capsule        No current facility-administered medications for this visit.        ALLERGIES  Allergies   Allergen Reactions    Percocet [Oxycodone-Acetaminophen] Other (See Comments)     Constipation x 4 days    Codeine Other (See Comments)     hallucinations    Morphine        PHYSICAL EXAM    /66 (Site: Right Upper Arm, Position: Sitting, Cuff Size: Large Adult)   Pulse 72   Resp 16   Ht 5' 7\" (1.702 m)   Wt 202 lb 12.8 oz (92 kg)   SpO2 99%   BMI

## 2023-11-13 ENCOUNTER — OFFICE VISIT (OUTPATIENT)
Dept: CARDIOLOGY CLINIC | Age: 73
End: 2023-11-13
Payer: MEDICARE

## 2023-11-13 VITALS
OXYGEN SATURATION: 98 % | WEIGHT: 204 LBS | DIASTOLIC BLOOD PRESSURE: 64 MMHG | BODY MASS INDEX: 32.02 KG/M2 | HEIGHT: 67 IN | SYSTOLIC BLOOD PRESSURE: 106 MMHG | HEART RATE: 71 BPM

## 2023-11-13 DIAGNOSIS — R07.9 CHEST PAIN, UNSPECIFIED TYPE: ICD-10-CM

## 2023-11-13 DIAGNOSIS — E78.5 HYPERLIPIDEMIA, UNSPECIFIED HYPERLIPIDEMIA TYPE: ICD-10-CM

## 2023-11-13 DIAGNOSIS — I10 ESSENTIAL HYPERTENSION: Primary | ICD-10-CM

## 2023-11-13 DIAGNOSIS — Q24.5 MYOCARDIAL BRIDGE: ICD-10-CM

## 2023-11-13 PROCEDURE — 99214 OFFICE O/P EST MOD 30 MIN: CPT | Performed by: NURSE PRACTITIONER

## 2023-11-13 PROCEDURE — 1090F PRES/ABSN URINE INCON ASSESS: CPT | Performed by: NURSE PRACTITIONER

## 2023-11-13 PROCEDURE — 1123F ACP DISCUSS/DSCN MKR DOCD: CPT | Performed by: NURSE PRACTITIONER

## 2023-11-13 PROCEDURE — G8417 CALC BMI ABV UP PARAM F/U: HCPCS | Performed by: NURSE PRACTITIONER

## 2023-11-13 PROCEDURE — 1036F TOBACCO NON-USER: CPT | Performed by: NURSE PRACTITIONER

## 2023-11-13 PROCEDURE — G8427 DOCREV CUR MEDS BY ELIG CLIN: HCPCS | Performed by: NURSE PRACTITIONER

## 2023-11-13 PROCEDURE — 3078F DIAST BP <80 MM HG: CPT | Performed by: NURSE PRACTITIONER

## 2023-11-13 PROCEDURE — 3017F COLORECTAL CA SCREEN DOC REV: CPT | Performed by: NURSE PRACTITIONER

## 2023-11-13 PROCEDURE — G9899 SCRN MAM PERF RSLTS DOC: HCPCS | Performed by: NURSE PRACTITIONER

## 2023-11-13 PROCEDURE — 3074F SYST BP LT 130 MM HG: CPT | Performed by: NURSE PRACTITIONER

## 2023-11-13 PROCEDURE — G8400 PT W/DXA NO RESULTS DOC: HCPCS | Performed by: NURSE PRACTITIONER

## 2023-11-13 PROCEDURE — G8484 FLU IMMUNIZE NO ADMIN: HCPCS | Performed by: NURSE PRACTITIONER

## 2023-11-13 ASSESSMENT — ENCOUNTER SYMPTOMS: SHORTNESS OF BREATH: 0

## 2023-11-13 NOTE — PATIENT INSTRUCTIONS
Please be informed that if you contact our office outside of normal business hours the physician on call cannot help with any scheduling or rescheduling issues, procedure instruction questions or any type of medication issue. We advise you for any urgent/emergency that you go to the nearest emergency room! PLEASE CALL OUR OFFICE DURING NORMAL BUSINESS HOURS    Monday - Friday   8 am to 5 pm    [place]: [address]: [phone]    [place]:  [phone]    **It is YOUR responsibilty to bring medication bottles and/or updated medication list to [address] [place] [address]. This will allow us to better serve you and all your healthcare needs**    [facility] Locations - No appointment necessary. Sites open Monday to Friday. Call your preferred location for test preparation, business   hours and other information you need. [employer] [facility] [employer]. [address] [place] [address]. [address] [place], [address] [facility]  Phone: [phone]       We are committed to providing you the best care possible. If you receive a survey after visiting one of our offices, please take time to share your experience concerning your physician office visit. These surveys are confidential and no health information about you is shared. We are eager to improve for you and we are counting on your feedback to help make that happen.

## 2023-11-13 NOTE — ASSESSMENT & PLAN NOTE
-At or near goal yes LDL-43   -She is to continue current medications (Lipitor 40 mg) Hepatic function panel WNL. No abdominal pain. No myalgias.     -The nature of cardiac risk has been fully discussed with this patient. I have made her aware of her LDL target goal given her cardiovascular risk analysis. I have discussed the appropriate diet. The need for lifelong compliance in order to reduce risk is stressed. A regular exercise program is recommended to help achieve and maintain normal body weight, fitness and improve lipid balance.

## 2023-11-13 NOTE — ASSESSMENT & PLAN NOTE
- Mid LAD myocardial bridging noted. Continue with Lopressor 12.5 mg twice daily. Patient reports chest pain has resolved.

## 2023-11-13 NOTE — ASSESSMENT & PLAN NOTE
- CTA of chest ruled out PE recently. She had LHC in January 2023 that showed mid LAD myocardial bridging. Patient appeared to have aneurysmal distal aorta. CTA of chest did not find any aneurysm.

## 2023-11-13 NOTE — ASSESSMENT & PLAN NOTE
- Stable, continue valsartan-hydrochlorothiazide 160-12.5 mg daily and metoprolol tartrate 12.5 mg twice daily

## 2024-03-12 ENCOUNTER — OFFICE VISIT (OUTPATIENT)
Dept: FAMILY MEDICINE CLINIC | Age: 74
End: 2024-03-12
Payer: MEDICARE

## 2024-03-12 VITALS
BODY MASS INDEX: 32.49 KG/M2 | HEIGHT: 67 IN | SYSTOLIC BLOOD PRESSURE: 124 MMHG | DIASTOLIC BLOOD PRESSURE: 86 MMHG | HEART RATE: 79 BPM | WEIGHT: 207 LBS | OXYGEN SATURATION: 98 %

## 2024-03-12 DIAGNOSIS — I10 ESSENTIAL HYPERTENSION: Primary | ICD-10-CM

## 2024-03-12 DIAGNOSIS — E78.5 HYPERLIPIDEMIA, UNSPECIFIED HYPERLIPIDEMIA TYPE: ICD-10-CM

## 2024-03-12 DIAGNOSIS — E03.9 HYPOTHYROIDISM, UNSPECIFIED TYPE: ICD-10-CM

## 2024-03-12 DIAGNOSIS — E83.42 HYPOMAGNESEMIA: ICD-10-CM

## 2024-03-12 DIAGNOSIS — K21.9 GASTROESOPHAGEAL REFLUX DISEASE, UNSPECIFIED WHETHER ESOPHAGITIS PRESENT: ICD-10-CM

## 2024-03-12 DIAGNOSIS — R94.4 DECREASED GFR: ICD-10-CM

## 2024-03-12 DIAGNOSIS — N18.31 STAGE 3A CHRONIC KIDNEY DISEASE (HCC): ICD-10-CM

## 2024-03-12 LAB
ANION GAP SERPL CALCULATED.3IONS-SCNC: 11 MMOL/L (ref 3–16)
BUN SERPL-MCNC: 13 MG/DL (ref 7–20)
CALCIUM SERPL-MCNC: 10.2 MG/DL (ref 8.3–10.6)
CHLORIDE SERPL-SCNC: 105 MMOL/L (ref 99–110)
CHOLEST SERPL-MCNC: 149 MG/DL (ref 0–199)
CO2 SERPL-SCNC: 28 MMOL/L (ref 21–32)
CREAT SERPL-MCNC: 0.8 MG/DL (ref 0.6–1.2)
GFR SERPLBLD CREATININE-BSD FMLA CKD-EPI: >60 ML/MIN/{1.73_M2}
GLUCOSE SERPL-MCNC: 96 MG/DL (ref 70–99)
HDLC SERPL-MCNC: 52 MG/DL (ref 40–60)
LDL CHOLESTEROL CALCULATED: 66 MG/DL
MAGNESIUM SERPL-MCNC: 2 MG/DL (ref 1.8–2.4)
POTASSIUM SERPL-SCNC: 4.2 MMOL/L (ref 3.5–5.1)
SODIUM SERPL-SCNC: 144 MMOL/L (ref 136–145)
TRIGL SERPL-MCNC: 157 MG/DL (ref 0–150)
VLDLC SERPL CALC-MCNC: 31 MG/DL

## 2024-03-12 PROCEDURE — G8400 PT W/DXA NO RESULTS DOC: HCPCS

## 2024-03-12 PROCEDURE — 1036F TOBACCO NON-USER: CPT

## 2024-03-12 PROCEDURE — 3079F DIAST BP 80-89 MM HG: CPT

## 2024-03-12 PROCEDURE — 1123F ACP DISCUSS/DSCN MKR DOCD: CPT

## 2024-03-12 PROCEDURE — G8484 FLU IMMUNIZE NO ADMIN: HCPCS

## 2024-03-12 PROCEDURE — G8417 CALC BMI ABV UP PARAM F/U: HCPCS

## 2024-03-12 PROCEDURE — 3017F COLORECTAL CA SCREEN DOC REV: CPT

## 2024-03-12 PROCEDURE — G8427 DOCREV CUR MEDS BY ELIG CLIN: HCPCS

## 2024-03-12 PROCEDURE — 1090F PRES/ABSN URINE INCON ASSESS: CPT

## 2024-03-12 PROCEDURE — 3074F SYST BP LT 130 MM HG: CPT

## 2024-03-12 PROCEDURE — 99214 OFFICE O/P EST MOD 30 MIN: CPT

## 2024-03-12 RX ORDER — ATORVASTATIN CALCIUM 40 MG/1
40 TABLET, FILM COATED ORAL DAILY
Qty: 90 TABLET | Refills: 1 | Status: SHIPPED | OUTPATIENT
Start: 2024-03-12

## 2024-03-12 RX ORDER — LEVOTHYROXINE SODIUM 0.05 MG/1
50 TABLET ORAL DAILY
Qty: 90 TABLET | Refills: 1 | Status: SHIPPED | OUTPATIENT
Start: 2024-03-12

## 2024-03-12 RX ORDER — PANTOPRAZOLE SODIUM 40 MG/1
40 TABLET, DELAYED RELEASE ORAL DAILY
Qty: 90 TABLET | Refills: 1 | Status: SHIPPED | OUTPATIENT
Start: 2024-03-12

## 2024-03-12 RX ORDER — VALSARTAN AND HYDROCHLOROTHIAZIDE 160; 12.5 MG/1; MG/1
1 TABLET, FILM COATED ORAL DAILY
Qty: 90 TABLET | Refills: 1 | Status: SHIPPED | OUTPATIENT
Start: 2024-03-12

## 2024-03-12 SDOH — ECONOMIC STABILITY: FOOD INSECURITY: WITHIN THE PAST 12 MONTHS, THE FOOD YOU BOUGHT JUST DIDN'T LAST AND YOU DIDN'T HAVE MONEY TO GET MORE.: NEVER TRUE

## 2024-03-12 SDOH — ECONOMIC STABILITY: INCOME INSECURITY: HOW HARD IS IT FOR YOU TO PAY FOR THE VERY BASICS LIKE FOOD, HOUSING, MEDICAL CARE, AND HEATING?: NOT HARD AT ALL

## 2024-03-12 SDOH — ECONOMIC STABILITY: FOOD INSECURITY: WITHIN THE PAST 12 MONTHS, YOU WORRIED THAT YOUR FOOD WOULD RUN OUT BEFORE YOU GOT MONEY TO BUY MORE.: NEVER TRUE

## 2024-03-12 ASSESSMENT — PATIENT HEALTH QUESTIONNAIRE - PHQ9
SUM OF ALL RESPONSES TO PHQ QUESTIONS 1-9: 0
1. LITTLE INTEREST OR PLEASURE IN DOING THINGS: 0
2. FEELING DOWN, DEPRESSED OR HOPELESS: 0
SUM OF ALL RESPONSES TO PHQ QUESTIONS 1-9: 0
SUM OF ALL RESPONSES TO PHQ9 QUESTIONS 1 & 2: 0
SUM OF ALL RESPONSES TO PHQ QUESTIONS 1-9: 0
SUM OF ALL RESPONSES TO PHQ QUESTIONS 1-9: 0

## 2024-03-12 ASSESSMENT — ENCOUNTER SYMPTOMS
COLOR CHANGE: 0
SHORTNESS OF BREATH: 0
BLOOD IN STOOL: 0
CONSTIPATION: 0
NAUSEA: 0
WHEEZING: 0
VOMITING: 0
DIARRHEA: 0
ABDOMINAL DISTENTION: 0
ABDOMINAL PAIN: 0

## 2024-03-12 NOTE — PROGRESS NOTES
whether esophagitis present  Condition stable on current regimen. No changes.   - pantoprazole (PROTONIX) 40 MG tablet; Take 1 tablet by mouth daily  Dispense: 90 tablet; Refill: 1    5. Stage 3a chronic kidney disease (HCC)  BMP ordered previously will be drawn today.     6. BMI 32.0-32.9,adult  Discussion with patient regarding 5 lb overall weight gain. Likely due to combination of weather and holidays.       Return in about 6 months (around 9/12/2024).          Plan of care reviewed with patient as described above, all questions answered at this time. Patient agreeable with plan of care.       Electronically signed by PRIYA Winters CNP on 3/12/2024

## 2024-05-29 ENCOUNTER — COMMUNITY OUTREACH (OUTPATIENT)
Dept: FAMILY MEDICINE CLINIC | Age: 74
End: 2024-05-29

## 2024-05-30 ENCOUNTER — OFFICE VISIT (OUTPATIENT)
Dept: CARDIOLOGY CLINIC | Age: 74
End: 2024-05-30
Payer: MEDICARE

## 2024-05-30 VITALS
HEIGHT: 66 IN | HEART RATE: 68 BPM | DIASTOLIC BLOOD PRESSURE: 68 MMHG | BODY MASS INDEX: 33.72 KG/M2 | WEIGHT: 209.8 LBS | SYSTOLIC BLOOD PRESSURE: 132 MMHG

## 2024-05-30 DIAGNOSIS — R07.9 CHEST PAIN, UNSPECIFIED TYPE: Primary | ICD-10-CM

## 2024-05-30 DIAGNOSIS — E78.5 HYPERLIPIDEMIA, UNSPECIFIED HYPERLIPIDEMIA TYPE: ICD-10-CM

## 2024-05-30 DIAGNOSIS — I10 ESSENTIAL HYPERTENSION: ICD-10-CM

## 2024-05-30 DIAGNOSIS — Q24.5 MYOCARDIAL BRIDGE: ICD-10-CM

## 2024-05-30 PROCEDURE — G8427 DOCREV CUR MEDS BY ELIG CLIN: HCPCS | Performed by: NURSE PRACTITIONER

## 2024-05-30 PROCEDURE — G8400 PT W/DXA NO RESULTS DOC: HCPCS | Performed by: NURSE PRACTITIONER

## 2024-05-30 PROCEDURE — 3078F DIAST BP <80 MM HG: CPT | Performed by: NURSE PRACTITIONER

## 2024-05-30 PROCEDURE — 3017F COLORECTAL CA SCREEN DOC REV: CPT | Performed by: NURSE PRACTITIONER

## 2024-05-30 PROCEDURE — 1123F ACP DISCUSS/DSCN MKR DOCD: CPT | Performed by: NURSE PRACTITIONER

## 2024-05-30 PROCEDURE — G8417 CALC BMI ABV UP PARAM F/U: HCPCS | Performed by: NURSE PRACTITIONER

## 2024-05-30 PROCEDURE — 3075F SYST BP GE 130 - 139MM HG: CPT | Performed by: NURSE PRACTITIONER

## 2024-05-30 PROCEDURE — 99214 OFFICE O/P EST MOD 30 MIN: CPT | Performed by: NURSE PRACTITIONER

## 2024-05-30 PROCEDURE — 1036F TOBACCO NON-USER: CPT | Performed by: NURSE PRACTITIONER

## 2024-05-30 PROCEDURE — 1090F PRES/ABSN URINE INCON ASSESS: CPT | Performed by: NURSE PRACTITIONER

## 2024-05-30 RX ORDER — ASPIRIN 325 MG
325 TABLET, DELAYED RELEASE (ENTERIC COATED) ORAL DAILY
Qty: 30 TABLET | Refills: 2
Start: 2024-05-30

## 2024-05-30 ASSESSMENT — ENCOUNTER SYMPTOMS: SHORTNESS OF BREATH: 0

## 2024-05-30 NOTE — PROGRESS NOTES
pantoprazole (PROTONIX) 40 MG tablet Take 1 tablet by mouth daily 90 tablet 1    Vitamin D, Cholecalciferol, 25 MCG (1000 UT) TABS Take 1,000 Units by mouth daily      GLUCOSAMINE-CHONDROITIN PO Take 1 capsule by mouth 2 times daily      Calcium Carbonate (CALCIUM 600 PO) Take 1 tablet by mouth 2 times daily      Omega-3 Fatty Acids (FISH OIL) 1200 MG CAPS Take 1,200 mg by mouth daily       No current facility-administered medications for this visit.       Review of Systems:  Review of Systems   Respiratory:  Negative for shortness of breath.    Cardiovascular:  Positive for chest pain. Negative for palpitations and leg swelling.   Musculoskeletal: Negative.    Skin: Negative.    Neurological:  Negative for dizziness and weakness.   All other systems reviewed and are negative.       Objective:      Physical Exam:  /68 (Site: Left Upper Arm, Position: Sitting, Cuff Size: Large Adult)   Pulse 68   Ht 1.676 m (5' 6\")   Wt 95.2 kg (209 lb 12.8 oz)   BMI 33.86 kg/m²   Wt Readings from Last 3 Encounters:   05/30/24 95.2 kg (209 lb 12.8 oz)   03/12/24 93.9 kg (207 lb)   11/13/23 92.5 kg (204 lb)     Body mass index is 33.86 kg/m².    Physical exam:  Physical Exam  Constitutional:       Appearance: She is well-developed.   Cardiovascular:      Rate and Rhythm: Normal rate and regular rhythm.      Pulses: Intact distal pulses.           Dorsalis pedis pulses are 2+ on the right side and 2+ on the left side.        Posterior tibial pulses are 2+ on the right side and 2+ on the left side.      Heart sounds: Normal heart sounds, S1 normal and S2 normal.   Pulmonary:      Effort: Pulmonary effort is normal.      Breath sounds: Normal breath sounds.   Musculoskeletal:         General: Normal range of motion.   Skin:     General: Skin is warm and dry.   Neurological:      Mental Status: She is alert and oriented to person, place, and time.          DATA:  Lab Results   Component Value Date/Time    CKTOTAL 53

## 2024-05-30 NOTE — PATIENT INSTRUCTIONS
**It is YOUR responsibilty to bring medication bottles and/or updated medication list to EACH APPOINTMENT. This will allow us to better serve you and all your healthcare needs**   Thank you for allowing us to care for you today!   We want to ensure we can follow your treatment plan and we strive to give you the best outcomes and experience possible.   If you ever have a life threatening emergency and call 911 - for an ambulance (EMS)   Our providers can only care for you at:   [facility] or [facility].   Even if you have someone take you or you drive yourself we can only care for you in a [facility]. Our providers are not setup at the other healthcare locations!   Please be informed that if you contact our office outside of normal business hours the physician on call cannot help with any scheduling or rescheduling issues, procedure instruction questions or any type of medication issue.    We advise you for any urgent/emergency that you go to the nearest emergency room!    PLEASE CALL OUR OFFICE DURING NORMAL BUSINESS HOURS    Monday - Friday   8 am to 5 pm    [place]: [phone]    [place]: [phone]    [place]:  [phone]  We are committed to providing you the best care possible.    If you receive a survey after visiting one of our offices, please take time to share your experience concerning your physician office visit.  These surveys are confidential and no health information about you is shared.    We are eager to improve for you and we are counting on your feedback to help make that happen.

## 2024-05-31 LAB — MAMMOGRAPHY, EXTERNAL: NEGATIVE

## 2024-08-14 ENCOUNTER — TELEPHONE (OUTPATIENT)
Dept: FAMILY MEDICINE CLINIC | Age: 74
End: 2024-08-14

## 2024-08-14 ENCOUNTER — TELEMEDICINE (OUTPATIENT)
Dept: FAMILY MEDICINE CLINIC | Age: 74
End: 2024-08-14

## 2024-08-14 DIAGNOSIS — Z00.00 MEDICARE ANNUAL WELLNESS VISIT, SUBSEQUENT: Primary | ICD-10-CM

## 2024-08-14 ASSESSMENT — PATIENT HEALTH QUESTIONNAIRE - PHQ9
SUM OF ALL RESPONSES TO PHQ QUESTIONS 1-9: 0
2. FEELING DOWN, DEPRESSED OR HOPELESS: NOT AT ALL
SUM OF ALL RESPONSES TO PHQ9 QUESTIONS 1 & 2: 0
1. LITTLE INTEREST OR PLEASURE IN DOING THINGS: NOT AT ALL
SUM OF ALL RESPONSES TO PHQ QUESTIONS 1-9: 0

## 2024-08-14 NOTE — PATIENT INSTRUCTIONS
Personalized Preventive Plan for Melissa Kapoor - 8/14/2024  Medicare offers a range of preventive health benefits. Some of the tests and screenings are paid in full while other may be subject to a deductible, co-insurance, and/or copay.    Some of these benefits include a comprehensive review of your medical history including lifestyle, illnesses that may run in your family, and various assessments and screenings as appropriate.    After reviewing your medical record and screening and assessments performed today your provider may have ordered immunizations, labs, imaging, and/or referrals for you.  A list of these orders (if applicable) as well as your Preventive Care list are included within your After Visit Summary for your review.    Other Preventive Recommendations:    A preventive eye exam performed by an eye specialist is recommended every 1-2 years to screen for glaucoma; cataracts, macular degeneration, and other eye disorders.  A preventive dental visit is recommended every 6 months.  Try to get at least 150 minutes of exercise per week or 10,000 steps per day on a pedometer .  Order or download the FREE \"Exercise & Physical Activity: Your Everyday Guide\" from [employer] [place] [employer]. Call 1-842.613.4502 or search [employer] [place] [employer] online.  You need 0615-1850 mg of calcium and 4052-2870 IU of vitamin D per day. It is possible to meet your calcium requirement with diet alone, but a vitamin D supplement is usually necessary to meet this goal.  When exposed to the sun, use a sunscreen that protects against both UVA and UVB radiation with an SPF of 30 or greater. Reapply every 2 to 3 hours or after sweating, drying off with a towel, or swimming.  Always wear a seat belt when traveling in a car. Always wear a helmet when riding a bicycle or motorcycle.

## 2024-08-14 NOTE — PROGRESS NOTES
Medicare Annual Wellness Visit    Melissa Kapoor is here for Medicare AWV    Assessment & Plan   Medicare annual wellness visit, subsequent  Recommendations for Preventive Services Due: see orders and patient instructions/AVS.  Recommended screening schedule for the next 5-10 years is provided to the patient in written form: see Patient Instructions/AVS.     No follow-ups on file.     Subjective       Patient's complete Health Risk Assessment and screening values have been reviewed and are found in Flowsheets. The following problems were reviewed today and where indicated follow up appointments were made and/or referrals ordered.    Positive Risk Factor Screenings with Interventions:                Inactivity:  On average, how many days per week do you engage in moderate to strenuous exercise (like a brisk walk)?: 0 days (!) Abnormal  On average, how many minutes do you engage in exercise at this level?: 0 min  Interventions:  Patient declined any further interventions or treatment     Abnormal BMI (obese):  There is no height or weight on file to calculate BMI. (!) Abnormal  Interventions:  Patient declines any further evaluation or treatment        Dentist Screen:  Have you seen the dentist within the past year?: (!) No (no dental coverage)    Intervention:  Patient comments: states she does not have dental coverage.  Patient declines any further evaluation or treatment     Vision Screen:  Do you have difficulty driving, watching TV, or doing any of your daily activities because of your eyesight?: No  Have you had an eye exam within the past year?: (!) No (no eye coverage)  Interventions:   Patient comments: states she has no eye coverage.  Patient declines any further evaluation or treatment                    Objective    Patient-Reported Vitals  No data recorded     Unable to obtain 3 vital signs due to patient not having equipment to take blood pressure/temperature.                Allergies   Allergen Reactions

## 2024-09-12 ENCOUNTER — OFFICE VISIT (OUTPATIENT)
Dept: FAMILY MEDICINE CLINIC | Age: 74
End: 2024-09-12

## 2024-09-12 VITALS
DIASTOLIC BLOOD PRESSURE: 80 MMHG | OXYGEN SATURATION: 98 % | SYSTOLIC BLOOD PRESSURE: 138 MMHG | HEIGHT: 66 IN | BODY MASS INDEX: 33.75 KG/M2 | HEART RATE: 67 BPM | WEIGHT: 210 LBS

## 2024-09-12 DIAGNOSIS — L98.9 NON-HEALING SKIN LESION OF NOSE: ICD-10-CM

## 2024-09-12 DIAGNOSIS — E78.5 HYPERLIPIDEMIA, UNSPECIFIED HYPERLIPIDEMIA TYPE: ICD-10-CM

## 2024-09-12 DIAGNOSIS — E03.9 HYPOTHYROIDISM, UNSPECIFIED TYPE: ICD-10-CM

## 2024-09-12 DIAGNOSIS — I10 ESSENTIAL HYPERTENSION: Primary | ICD-10-CM

## 2024-09-12 DIAGNOSIS — K21.9 GASTROESOPHAGEAL REFLUX DISEASE, UNSPECIFIED WHETHER ESOPHAGITIS PRESENT: ICD-10-CM

## 2024-09-12 DIAGNOSIS — Z23 NEED FOR INFLUENZA VACCINATION: ICD-10-CM

## 2024-09-12 RX ORDER — METOPROLOL TARTRATE 25 MG/1
12.5 TABLET, FILM COATED ORAL 2 TIMES DAILY
Qty: 90 TABLET | Refills: 1 | Status: SHIPPED | OUTPATIENT
Start: 2024-09-12

## 2024-09-12 RX ORDER — VALSARTAN AND HYDROCHLOROTHIAZIDE 160; 12.5 MG/1; MG/1
1 TABLET, FILM COATED ORAL DAILY
Qty: 90 TABLET | Refills: 1 | Status: SHIPPED | OUTPATIENT
Start: 2024-09-12

## 2024-09-12 RX ORDER — PANTOPRAZOLE SODIUM 40 MG/1
40 TABLET, DELAYED RELEASE ORAL DAILY
Qty: 90 TABLET | Refills: 1 | Status: SHIPPED | OUTPATIENT
Start: 2024-09-12

## 2024-09-12 RX ORDER — LEVOTHYROXINE SODIUM 50 UG/1
50 TABLET ORAL DAILY
Qty: 90 TABLET | Refills: 1 | Status: SHIPPED | OUTPATIENT
Start: 2024-09-12

## 2024-09-12 RX ORDER — ATORVASTATIN CALCIUM 40 MG/1
40 TABLET, FILM COATED ORAL DAILY
Qty: 90 TABLET | Refills: 1 | Status: SHIPPED | OUTPATIENT
Start: 2024-09-12

## 2024-09-12 ASSESSMENT — ENCOUNTER SYMPTOMS
WHEEZING: 0
CONSTIPATION: 0
ABDOMINAL DISTENTION: 0
NAUSEA: 0
SHORTNESS OF BREATH: 0
VOMITING: 0
COLOR CHANGE: 0
BLOOD IN STOOL: 0
DIARRHEA: 0
ABDOMINAL PAIN: 0

## 2024-12-03 ENCOUNTER — OFFICE VISIT (OUTPATIENT)
Dept: CARDIOLOGY CLINIC | Age: 74
End: 2024-12-03
Payer: MEDICARE

## 2024-12-03 VITALS
SYSTOLIC BLOOD PRESSURE: 126 MMHG | DIASTOLIC BLOOD PRESSURE: 80 MMHG | BODY MASS INDEX: 33.24 KG/M2 | HEIGHT: 67 IN | WEIGHT: 211.8 LBS | HEART RATE: 72 BPM

## 2024-12-03 DIAGNOSIS — Z01.810 PREOP CARDIOVASCULAR EXAM: ICD-10-CM

## 2024-12-03 DIAGNOSIS — E78.5 HYPERLIPIDEMIA, UNSPECIFIED HYPERLIPIDEMIA TYPE: ICD-10-CM

## 2024-12-03 DIAGNOSIS — R07.9 CHEST PAIN, UNSPECIFIED TYPE: Primary | ICD-10-CM

## 2024-12-03 DIAGNOSIS — I10 ESSENTIAL HYPERTENSION: ICD-10-CM

## 2024-12-03 PROCEDURE — 99214 OFFICE O/P EST MOD 30 MIN: CPT | Performed by: INTERNAL MEDICINE

## 2024-12-03 PROCEDURE — 1036F TOBACCO NON-USER: CPT | Performed by: INTERNAL MEDICINE

## 2024-12-03 PROCEDURE — 1123F ACP DISCUSS/DSCN MKR DOCD: CPT | Performed by: INTERNAL MEDICINE

## 2024-12-03 PROCEDURE — 3074F SYST BP LT 130 MM HG: CPT | Performed by: INTERNAL MEDICINE

## 2024-12-03 PROCEDURE — 1090F PRES/ABSN URINE INCON ASSESS: CPT | Performed by: INTERNAL MEDICINE

## 2024-12-03 PROCEDURE — 93000 ELECTROCARDIOGRAM COMPLETE: CPT | Performed by: INTERNAL MEDICINE

## 2024-12-03 PROCEDURE — 3017F COLORECTAL CA SCREEN DOC REV: CPT | Performed by: INTERNAL MEDICINE

## 2024-12-03 PROCEDURE — G8400 PT W/DXA NO RESULTS DOC: HCPCS | Performed by: INTERNAL MEDICINE

## 2024-12-03 PROCEDURE — 3079F DIAST BP 80-89 MM HG: CPT | Performed by: INTERNAL MEDICINE

## 2024-12-03 PROCEDURE — 1159F MED LIST DOCD IN RCRD: CPT | Performed by: INTERNAL MEDICINE

## 2024-12-03 PROCEDURE — G8427 DOCREV CUR MEDS BY ELIG CLIN: HCPCS | Performed by: INTERNAL MEDICINE

## 2024-12-03 PROCEDURE — G8482 FLU IMMUNIZE ORDER/ADMIN: HCPCS | Performed by: INTERNAL MEDICINE

## 2024-12-03 PROCEDURE — G8417 CALC BMI ABV UP PARAM F/U: HCPCS | Performed by: INTERNAL MEDICINE

## 2024-12-03 NOTE — PROGRESS NOTES
Aimee Martin MD        OFFICE  FOLLOWUP NOTE    Chief complaints: patient is here for management of CAD, chest pain, HTN, DYSLPIDEMIA     Subjective: patient feels better, no chest pain, no shortness of breath, no dizziness, no palpitations    HPI Melissa ANSARI is a 73 y.o.year old who  has a past medical history of Essential hypertension, GERD (gastroesophageal reflux disease), History of cardiac cath, History of cardiac monitoring, Hyperlipidemia, and Hypothyroidism. and presents for management of CAD, chest pain, HTN, DYSLPIDEMIA  which are well controlled      Current Outpatient Medications   Medication Sig Dispense Refill    pantoprazole (PROTONIX) 40 MG tablet Take 1 tablet by mouth daily 90 tablet 1    valsartan-hydroCHLOROthiazide (DIOVAN-HCT) 160-12.5 MG per tablet Take 1 tablet by mouth daily 90 tablet 1    levothyroxine (SYNTHROID) 50 MCG tablet Take 1 tablet by mouth daily 90 tablet 1    atorvastatin (LIPITOR) 40 MG tablet Take 1 tablet by mouth daily 90 tablet 1    metoprolol tartrate (LOPRESSOR) 25 MG tablet Take 0.5 tablets by mouth 2 times daily 90 tablet 1    aspirin 325 MG EC tablet Take 1 tablet by mouth daily 30 tablet 2    Vitamin D, Cholecalciferol, 25 MCG (1000 UT) TABS Take 1 tablet by mouth daily      GLUCOSAMINE-CHONDROITIN PO Take 1 capsule by mouth 2 times daily      Calcium Carbonate (CALCIUM 600 PO) Take 1 tablet by mouth 2 times daily      Omega-3 Fatty Acids (FISH OIL) 1200 MG CAPS Take 1,200 mg by mouth daily       No current facility-administered medications for this visit.     Allergies: Percocet [oxycodone-acetaminophen], Codeine, and Morphine  Past Medical History:   Diagnosis Date    Essential hypertension 07/20/2019    GERD (gastroesophageal reflux disease) 07/20/2019    History of cardiac cath 01/24/2023    LAD: Mild Lumen Irregularity.mid LAD myocardial bridging noted    History of cardiac monitoring     Hyperlipidemia 07/20/2019    Hypothyroidism 07/20/2019

## 2025-03-12 SDOH — ECONOMIC STABILITY: INCOME INSECURITY: IN THE LAST 12 MONTHS, WAS THERE A TIME WHEN YOU WERE NOT ABLE TO PAY THE MORTGAGE OR RENT ON TIME?: NO

## 2025-03-12 SDOH — ECONOMIC STABILITY: FOOD INSECURITY: WITHIN THE PAST 12 MONTHS, THE FOOD YOU BOUGHT JUST DIDN'T LAST AND YOU DIDN'T HAVE MONEY TO GET MORE.: NEVER TRUE

## 2025-03-12 SDOH — ECONOMIC STABILITY: FOOD INSECURITY: WITHIN THE PAST 12 MONTHS, YOU WORRIED THAT YOUR FOOD WOULD RUN OUT BEFORE YOU GOT MONEY TO BUY MORE.: NEVER TRUE

## 2025-03-12 ASSESSMENT — PATIENT HEALTH QUESTIONNAIRE - PHQ9
2. FEELING DOWN, DEPRESSED OR HOPELESS: NOT AT ALL
2. FEELING DOWN, DEPRESSED OR HOPELESS: NOT AT ALL
SUM OF ALL RESPONSES TO PHQ QUESTIONS 1-9: 0
1. LITTLE INTEREST OR PLEASURE IN DOING THINGS: NOT AT ALL
SUM OF ALL RESPONSES TO PHQ9 QUESTIONS 1 & 2: 0
SUM OF ALL RESPONSES TO PHQ QUESTIONS 1-9: 0
1. LITTLE INTEREST OR PLEASURE IN DOING THINGS: NOT AT ALL

## 2025-03-13 ENCOUNTER — OFFICE VISIT (OUTPATIENT)
Dept: FAMILY MEDICINE CLINIC | Age: 75
End: 2025-03-13

## 2025-03-13 ENCOUNTER — RESULTS FOLLOW-UP (OUTPATIENT)
Dept: FAMILY MEDICINE CLINIC | Age: 75
End: 2025-03-13

## 2025-03-13 VITALS
HEIGHT: 67 IN | OXYGEN SATURATION: 97 % | DIASTOLIC BLOOD PRESSURE: 76 MMHG | HEART RATE: 73 BPM | WEIGHT: 210 LBS | SYSTOLIC BLOOD PRESSURE: 114 MMHG | BODY MASS INDEX: 32.96 KG/M2

## 2025-03-13 DIAGNOSIS — E83.42 HYPOMAGNESEMIA: ICD-10-CM

## 2025-03-13 DIAGNOSIS — I10 ESSENTIAL HYPERTENSION: Primary | ICD-10-CM

## 2025-03-13 DIAGNOSIS — K21.9 GASTROESOPHAGEAL REFLUX DISEASE, UNSPECIFIED WHETHER ESOPHAGITIS PRESENT: ICD-10-CM

## 2025-03-13 DIAGNOSIS — L98.9 NON-HEALING SKIN LESION OF NOSE: ICD-10-CM

## 2025-03-13 DIAGNOSIS — E03.9 HYPOTHYROIDISM, UNSPECIFIED TYPE: ICD-10-CM

## 2025-03-13 DIAGNOSIS — E78.5 HYPERLIPIDEMIA, UNSPECIFIED HYPERLIPIDEMIA TYPE: ICD-10-CM

## 2025-03-13 DIAGNOSIS — I10 ESSENTIAL HYPERTENSION: ICD-10-CM

## 2025-03-13 DIAGNOSIS — R73.01 IFG (IMPAIRED FASTING GLUCOSE): Chronic | ICD-10-CM

## 2025-03-13 DIAGNOSIS — N18.31 STAGE 3A CHRONIC KIDNEY DISEASE (HCC): ICD-10-CM

## 2025-03-13 DIAGNOSIS — E83.52 HYPERCALCEMIA: Primary | ICD-10-CM

## 2025-03-13 LAB
ALBUMIN SERPL-MCNC: 4.8 G/DL (ref 3.4–5)
ALBUMIN/GLOB SERPL: 2.1 {RATIO} (ref 1.1–2.2)
ALP SERPL-CCNC: 83 U/L (ref 40–129)
ALT SERPL-CCNC: 36 U/L (ref 10–40)
ANION GAP SERPL CALCULATED.3IONS-SCNC: 11 MMOL/L (ref 3–16)
AST SERPL-CCNC: 31 U/L (ref 15–37)
BASOPHILS # BLD: 0 K/UL (ref 0–0.2)
BASOPHILS NFR BLD: 0.5 %
BILIRUB SERPL-MCNC: 0.6 MG/DL (ref 0–1)
BUN SERPL-MCNC: 17 MG/DL (ref 7–20)
CALCIUM SERPL-MCNC: 10.9 MG/DL (ref 8.3–10.6)
CHLORIDE SERPL-SCNC: 103 MMOL/L (ref 99–110)
CHOLEST SERPL-MCNC: 135 MG/DL (ref 0–199)
CO2 SERPL-SCNC: 28 MMOL/L (ref 21–32)
CREAT SERPL-MCNC: 1.1 MG/DL (ref 0.6–1.2)
DEPRECATED RDW RBC AUTO: 13.1 % (ref 12.4–15.4)
EOSINOPHIL # BLD: 0.1 K/UL (ref 0–0.6)
EOSINOPHIL NFR BLD: 2.8 %
EST. AVERAGE GLUCOSE BLD GHB EST-MCNC: 102.5 MG/DL
GFR SERPLBLD CREATININE-BSD FMLA CKD-EPI: 52 ML/MIN/{1.73_M2}
GLUCOSE SERPL-MCNC: 104 MG/DL (ref 70–99)
HBA1C MFR BLD: 5.2 %
HCT VFR BLD AUTO: 40.5 % (ref 36–48)
HDLC SERPL-MCNC: 55 MG/DL (ref 40–60)
HGB BLD-MCNC: 13.8 G/DL (ref 12–16)
LDL CHOLESTEROL: 51 MG/DL
LYMPHOCYTES # BLD: 1.5 K/UL (ref 1–5.1)
LYMPHOCYTES NFR BLD: 29.9 %
MAGNESIUM SERPL-MCNC: 1.99 MG/DL (ref 1.8–2.4)
MCH RBC QN AUTO: 33 PG (ref 26–34)
MCHC RBC AUTO-ENTMCNC: 34.2 G/DL (ref 31–36)
MCV RBC AUTO: 96.4 FL (ref 80–100)
MONOCYTES # BLD: 0.3 K/UL (ref 0–1.3)
MONOCYTES NFR BLD: 5.2 %
NEUTROPHILS # BLD: 3.2 K/UL (ref 1.7–7.7)
NEUTROPHILS NFR BLD: 61.6 %
PLATELET # BLD AUTO: 210 K/UL (ref 135–450)
PMV BLD AUTO: 8.9 FL (ref 5–10.5)
POTASSIUM SERPL-SCNC: 4.7 MMOL/L (ref 3.5–5.1)
PROT SERPL-MCNC: 7.1 G/DL (ref 6.4–8.2)
RBC # BLD AUTO: 4.2 M/UL (ref 4–5.2)
SODIUM SERPL-SCNC: 142 MMOL/L (ref 136–145)
TRIGL SERPL-MCNC: 144 MG/DL (ref 0–150)
TSH SERPL DL<=0.005 MIU/L-ACNC: 2.06 UIU/ML (ref 0.27–4.2)
VLDLC SERPL CALC-MCNC: 29 MG/DL
WBC # BLD AUTO: 5.1 K/UL (ref 4–11)

## 2025-03-13 RX ORDER — VALSARTAN AND HYDROCHLOROTHIAZIDE 160; 12.5 MG/1; MG/1
1 TABLET, FILM COATED ORAL DAILY
Qty: 90 TABLET | Refills: 1 | Status: SHIPPED | OUTPATIENT
Start: 2025-03-13

## 2025-03-13 RX ORDER — LEVOTHYROXINE SODIUM 50 UG/1
50 TABLET ORAL DAILY
Qty: 90 TABLET | Refills: 1 | Status: SHIPPED | OUTPATIENT
Start: 2025-03-13

## 2025-03-13 RX ORDER — PANTOPRAZOLE SODIUM 40 MG/1
40 TABLET, DELAYED RELEASE ORAL DAILY
Qty: 90 TABLET | Refills: 1 | Status: SHIPPED | OUTPATIENT
Start: 2025-03-13

## 2025-03-13 RX ORDER — ATORVASTATIN CALCIUM 40 MG/1
40 TABLET, FILM COATED ORAL DAILY
Qty: 90 TABLET | Refills: 1 | Status: SHIPPED | OUTPATIENT
Start: 2025-03-13

## 2025-03-13 ASSESSMENT — ENCOUNTER SYMPTOMS
BLOOD IN STOOL: 0
SHORTNESS OF BREATH: 0
NAUSEA: 0
ABDOMINAL PAIN: 0
COLOR CHANGE: 0
DIARRHEA: 0
CONSTIPATION: 0
ABDOMINAL DISTENTION: 0
VOMITING: 0
WHEEZING: 0

## 2025-03-13 NOTE — PROGRESS NOTES
FT4      4. Gastroesophageal reflux disease, unspecified whether esophagitis present  pantoprazole (PROTONIX) 40 MG tablet      5. Stage 3a chronic kidney disease (HCC)  Comprehensive Metabolic Panel      6. Hypomagnesemia  Magnesium      7. IFG (impaired fasting glucose)  Hemoglobin A1C      8. Non-healing skin lesion of nose          Assessment & Plan  1. Medication management.  Weight has remained stable. Prescriptions for levothyroxine and metoprolol will be sent to [facility]. Discussed multiple options for reducing the cost of her medications. She would like to discuss these with her  and will notify office if she would medications sent to an alternate pharmacy.  Blood work will be updated today.    2. Hypertension  Well managed on current regimen.     3. Non-healing skin lesion of nose  Had previously encouraged her to follow with dermatology but she has been applying new skin and reports that the lesion is shrinking and now scabbing over which is was not doing before.     4. GERD  Condition stable on current regimen. No changes.      The patient (or guardian, if applicable) and other individuals in attendance with the patient were advised that Artificial Intelligence will be utilized during this visit to record, process the conversation to generate a clinical note, and support improvement of the AI technology. The patient (or guardian, if applicable) and other individuals in attendance at the appointment consented to the use of AI, including the recording.      Plan of care reviewed with patient as described above, all questions answered at this time. Patient agreeable with plan of care.       Electronically signed by PRIYA Winters CNP on 3/13/2025

## 2025-04-03 RX ORDER — METOPROLOL TARTRATE 25 MG/1
12.5 TABLET, FILM COATED ORAL 2 TIMES DAILY
Qty: 90 TABLET | Refills: 1 | OUTPATIENT
Start: 2025-04-03

## 2025-04-08 ENCOUNTER — TELEPHONE (OUTPATIENT)
Dept: CARDIOLOGY CLINIC | Age: 75
End: 2025-04-08

## 2025-04-08 RX ORDER — METOPROLOL TARTRATE 25 MG/1
12.5 TABLET, FILM COATED ORAL 2 TIMES DAILY
Qty: 90 TABLET | Refills: 1 | Status: SHIPPED | OUTPATIENT
Start: 2025-04-08

## 2025-04-08 NOTE — TELEPHONE ENCOUNTER
Pt called to check up on refill request for Metoprolol. She requested this medication on 4/3 and takes her last pill tonight.

## 2025-06-02 LAB — MAMMOGRAPHY, EXTERNAL: NORMAL

## 2025-06-03 ENCOUNTER — OFFICE VISIT (OUTPATIENT)
Dept: CARDIOLOGY CLINIC | Age: 75
End: 2025-06-03
Payer: MEDICARE

## 2025-06-03 VITALS
HEART RATE: 82 BPM | DIASTOLIC BLOOD PRESSURE: 68 MMHG | WEIGHT: 211 LBS | BODY MASS INDEX: 33.91 KG/M2 | SYSTOLIC BLOOD PRESSURE: 122 MMHG | HEIGHT: 66 IN

## 2025-06-03 DIAGNOSIS — Q24.5 MYOCARDIAL BRIDGE: ICD-10-CM

## 2025-06-03 DIAGNOSIS — I10 ESSENTIAL HYPERTENSION: Primary | ICD-10-CM

## 2025-06-03 DIAGNOSIS — E78.5 HYPERLIPIDEMIA, UNSPECIFIED HYPERLIPIDEMIA TYPE: ICD-10-CM

## 2025-06-03 DIAGNOSIS — R06.02 SHORTNESS OF BREATH: ICD-10-CM

## 2025-06-03 DIAGNOSIS — R07.9 CHEST PAIN, UNSPECIFIED TYPE: ICD-10-CM

## 2025-06-03 PROCEDURE — 99214 OFFICE O/P EST MOD 30 MIN: CPT | Performed by: INTERNAL MEDICINE

## 2025-06-03 PROCEDURE — 1123F ACP DISCUSS/DSCN MKR DOCD: CPT | Performed by: INTERNAL MEDICINE

## 2025-06-03 PROCEDURE — 1159F MED LIST DOCD IN RCRD: CPT | Performed by: INTERNAL MEDICINE

## 2025-06-03 PROCEDURE — 1036F TOBACCO NON-USER: CPT | Performed by: INTERNAL MEDICINE

## 2025-06-03 PROCEDURE — G8427 DOCREV CUR MEDS BY ELIG CLIN: HCPCS | Performed by: INTERNAL MEDICINE

## 2025-06-03 PROCEDURE — 3078F DIAST BP <80 MM HG: CPT | Performed by: INTERNAL MEDICINE

## 2025-06-03 PROCEDURE — 1090F PRES/ABSN URINE INCON ASSESS: CPT | Performed by: INTERNAL MEDICINE

## 2025-06-03 PROCEDURE — 3074F SYST BP LT 130 MM HG: CPT | Performed by: INTERNAL MEDICINE

## 2025-06-03 PROCEDURE — G8417 CALC BMI ABV UP PARAM F/U: HCPCS | Performed by: INTERNAL MEDICINE

## 2025-06-03 PROCEDURE — G8400 PT W/DXA NO RESULTS DOC: HCPCS | Performed by: INTERNAL MEDICINE

## 2025-06-03 RX ORDER — METOPROLOL SUCCINATE 25 MG/1
25 TABLET, EXTENDED RELEASE ORAL DAILY
Qty: 30 TABLET | Refills: 3 | Status: SHIPPED | OUTPATIENT
Start: 2025-06-03

## 2025-06-03 RX ORDER — METOPROLOL SUCCINATE 25 MG/1
25 TABLET, EXTENDED RELEASE ORAL DAILY
Qty: 90 TABLET | OUTPATIENT
Start: 2025-06-03

## 2025-06-03 NOTE — PATIENT INSTRUCTIONS
Thank you for allowing us to care for you today!   We want to ensure we can follow your treatment plan and we strive to give you the best outcomes and experience possible.   If you ever have a life threatening emergency and call 911 - for an ambulance (EMS)  REMEMBER  Our providers can only care for you at:   [facility] or [facility]   Even if you have someone take you or you drive yourself we can only care for you in a [facility] facility. Our providers are not setup at the other healthcare locations!    PLEASE CALL OUR OFFICE DURING NORMAL BUSINESS HOURS  Monday through Friday 8 am to 5 pm  AFTER HOURS the physician on-call cannot help with scheduling, rescheduling, procedure instruction questions or any type of medication need or issue.  [facility] P:[phone] - [facility] P:[phone] - [facility] P:[phone]      If you receive a survey:  We would appreciate you taking the time to share your experience concerning your provider visit in the office.    These surveys are confidential!  We are eager to improve and are counting on you to share your feedback so we can ensure you get the best care possible.

## 2025-06-03 NOTE — PROGRESS NOTES
CLINICAL STAFF DOCUMENTATION    Dr. Aimee Kapoor  1950  [id number]    Have you had any Chest Pain recently? - No      Have you had any Shortness of Breath - No    Have you had any dizziness - No    Have you had any palpitations recently? - No    Any thyroid issues? - Yes, on synthroid     Do you have any edema - swelling in No      When did you have your last labs drawn 3/202  What doctor ordered Raisa Merrill   Do we have the labs in their chart Yes    Do you need any prescriptions refilled? - No    Do you have a surgery or procedure scheduled in the near future - Yes, does not need clearance.     Do use tobacco products? - No  Do you drink alcohol? - No  Do you use any illicit drugs? - No  Caffeine? - Yes, coffee    Check medication list thoroughly!!! AND RECONCILE OUTSIDE MEDICATIONS  If dose has changed change the entire order not just the MG  BE SURE TO ASK PATIENT IF THEY NEED MEDICATION REFILLS  Verify Pharmacy and update if incorrect  Add to every patient's \"wrap up\" the following dot phrase AFTERVISITCARDIOHEARTHOUSE and ensure we explain this to our patients

## 2025-06-03 NOTE — PROGRESS NOTES
Aimee Martin MD        OFFICE  FOLLOWUP NOTE    Chief complaints: patient is here for management of  CAD, chest pain, HTN, DYSLPIDEMIA     Subjective: patient feels better, no chest pain, no shortness of breath, no dizziness, no palpitations    HPI Melissa ANSARI is a 74 y.o.year old who  has a past medical history of Essential hypertension, GERD (gastroesophageal reflux disease), History of cardiac cath, History of cardiac monitoring, Hyperlipidemia, and Hypothyroidism. and presents for management of CAD, DM, HTN, AF CAD, chest pain, HTN, DYSLPIDEMIA B, which are well controlled      Current Outpatient Medications   Medication Sig Dispense Refill    metoprolol tartrate (LOPRESSOR) 25 MG tablet Take 0.5 tablets by mouth 2 times daily 90 tablet 1    atorvastatin (LIPITOR) 40 MG tablet Take 1 tablet by mouth daily 90 tablet 1    levothyroxine (SYNTHROID) 50 MCG tablet Take 1 tablet by mouth daily 90 tablet 1    pantoprazole (PROTONIX) 40 MG tablet Take 1 tablet by mouth daily 90 tablet 1    valsartan-hydroCHLOROthiazide (DIOVAN-HCT) 160-12.5 MG per tablet Take 1 tablet by mouth daily 90 tablet 1    aspirin 325 MG EC tablet Take 1 tablet by mouth daily 30 tablet 2    Vitamin D, Cholecalciferol, 25 MCG (1000 UT) TABS Take 1 tablet by mouth daily      GLUCOSAMINE-CHONDROITIN PO Take 1 capsule by mouth 2 times daily      Calcium Carbonate (CALCIUM 600 PO) Take 1 tablet by mouth 2 times daily      Omega-3 Fatty Acids (FISH OIL) 1200 MG CAPS Take 1,200 mg by mouth daily       No current facility-administered medications for this visit.     Allergies: Percocet [oxycodone-acetaminophen], Codeine, and Morphine  Past Medical History:   Diagnosis Date    Essential hypertension 07/20/2019    GERD (gastroesophageal reflux disease) 07/20/2019    History of cardiac cath 01/24/2023    LAD: Mild Lumen Irregularity.mid LAD myocardial bridging noted    History of cardiac monitoring     Hyperlipidemia 07/20/2019

## 2025-06-13 ENCOUNTER — RESULTS FOLLOW-UP (OUTPATIENT)
Dept: FAMILY MEDICINE CLINIC | Age: 75
End: 2025-06-13

## 2025-07-09 ENCOUNTER — OFFICE VISIT (OUTPATIENT)
Dept: FAMILY MEDICINE CLINIC | Age: 75
End: 2025-07-09

## 2025-07-09 VITALS
OXYGEN SATURATION: 98 % | HEART RATE: 62 BPM | BODY MASS INDEX: 33.59 KG/M2 | SYSTOLIC BLOOD PRESSURE: 136 MMHG | WEIGHT: 209 LBS | HEIGHT: 66 IN | DIASTOLIC BLOOD PRESSURE: 80 MMHG

## 2025-07-09 DIAGNOSIS — N18.31 STAGE 3A CHRONIC KIDNEY DISEASE (HCC): ICD-10-CM

## 2025-07-09 DIAGNOSIS — E03.9 HYPOTHYROIDISM, UNSPECIFIED TYPE: ICD-10-CM

## 2025-07-09 DIAGNOSIS — Z01.818 PRE-OP EVALUATION: Primary | ICD-10-CM

## 2025-07-09 DIAGNOSIS — I10 ESSENTIAL HYPERTENSION: ICD-10-CM

## 2025-07-09 DIAGNOSIS — E78.5 HYPERLIPIDEMIA, UNSPECIFIED HYPERLIPIDEMIA TYPE: ICD-10-CM

## 2025-07-09 DIAGNOSIS — C44.619 BASAL CELL CARCINOMA (BCC) OF SKIN OF LEFT UPPER EXTREMITY INCLUDING SHOULDER: ICD-10-CM

## 2025-07-09 ASSESSMENT — ENCOUNTER SYMPTOMS
CHEST TIGHTNESS: 0
VOMITING: 0
BLOOD IN STOOL: 0
NAUSEA: 0
EYE PAIN: 0
WHEEZING: 0
SHORTNESS OF BREATH: 0
ABDOMINAL PAIN: 0
DIARRHEA: 0
TROUBLE SWALLOWING: 0

## 2025-07-09 NOTE — PROGRESS NOTES
7/9/2025    Melissa ANSARI Sharp    Chief Complaint   Patient presents with    Pre-op Exam     Testing done, need H&P. Procedure 7/25       HPI  History was obtained from the patient and review of chart material.  Melissa ANSARI is a 74 y.o. female who presents today with need for preop evaluation and H&P for excision of basal cell CA left shoulder and right side of nose.  This will be performed by Dr Bruce on 7/25/2025    Patient without history of recent chest pain.  She follows regularly with cardiology and had a fairly normal cardiac catheter than myocardial bridging in 2023.  She has a past history of hypertension, hyperlipidemia, hypothyroidism, GERD, CKD 3, and hyperglycemia.  Patient feeling well without history of shortness of breath or recent fever.    REVIEW OF SYMPTOMS    Review of Systems   Constitutional:  Negative for activity change and fatigue.   HENT:  Negative for congestion, hearing loss, mouth sores and trouble swallowing.    Eyes:  Negative for pain and visual disturbance.   Respiratory:  Negative for chest tightness, shortness of breath and wheezing.    Cardiovascular:  Negative for chest pain and palpitations.        Patient with history of hypertension, hyperlipidemia, and myocardial bridging   Gastrointestinal:  Negative for abdominal pain, blood in stool, diarrhea, nausea and vomiting.   Endocrine:        Patient with history of hyperglycemia and hypothyroidism have been fairly stable.   Genitourinary:  Negative for dysuria, frequency and urgency.        Patient with CKD 3   Musculoskeletal:  Positive for arthralgias. Negative for gait problem and neck stiffness.   Skin:  Negative for rash.        Patient with recent biopsy of left shoulder and right side of nose found to be basal cell CA will have more definitive surgery on 7/25/2025 as outlined under HPI.   Allergic/Immunologic: Negative for environmental allergies.   Neurological:  Negative for dizziness, seizures, speech difficulty and

## 2025-08-14 ENCOUNTER — TELEPHONE (OUTPATIENT)
Dept: CARDIOLOGY CLINIC | Age: 75
End: 2025-08-14

## 2025-08-19 ENCOUNTER — TELEPHONE (OUTPATIENT)
Dept: CARDIOLOGY CLINIC | Age: 75
End: 2025-08-19

## 2025-08-20 ENCOUNTER — TELEMEDICINE (OUTPATIENT)
Dept: FAMILY MEDICINE CLINIC | Age: 75
End: 2025-08-20

## 2025-08-20 DIAGNOSIS — Z00.00 MEDICARE ANNUAL WELLNESS VISIT, SUBSEQUENT: Primary | ICD-10-CM

## 2025-08-20 ASSESSMENT — PATIENT HEALTH QUESTIONNAIRE - PHQ9
SUM OF ALL RESPONSES TO PHQ QUESTIONS 1-9: 0
SUM OF ALL RESPONSES TO PHQ QUESTIONS 1-9: 0
1. LITTLE INTEREST OR PLEASURE IN DOING THINGS: NOT AT ALL
2. FEELING DOWN, DEPRESSED OR HOPELESS: NOT AT ALL
SUM OF ALL RESPONSES TO PHQ QUESTIONS 1-9: 0
SUM OF ALL RESPONSES TO PHQ QUESTIONS 1-9: 0
